# Patient Record
Sex: FEMALE | Race: WHITE
[De-identification: names, ages, dates, MRNs, and addresses within clinical notes are randomized per-mention and may not be internally consistent; named-entity substitution may affect disease eponyms.]

---

## 2018-05-15 ENCOUNTER — HOSPITAL ENCOUNTER (INPATIENT)
Dept: HOSPITAL 25 - AA | Age: 63
LOS: 44 days | Discharge: HOME HEALTH | DRG: 470 | End: 2018-06-28
Attending: ORTHOPAEDIC SURGERY | Admitting: ORTHOPAEDIC SURGERY
Payer: MEDICAID

## 2018-05-15 DIAGNOSIS — Z79.01: ICD-10-CM

## 2018-05-15 DIAGNOSIS — Z72.89: ICD-10-CM

## 2018-05-15 DIAGNOSIS — Z87.442: ICD-10-CM

## 2018-05-15 DIAGNOSIS — E11.9: ICD-10-CM

## 2018-05-15 DIAGNOSIS — D62: ICD-10-CM

## 2018-05-15 DIAGNOSIS — F41.9: ICD-10-CM

## 2018-05-15 DIAGNOSIS — M21.062: ICD-10-CM

## 2018-05-15 DIAGNOSIS — Z88.0: ICD-10-CM

## 2018-05-15 DIAGNOSIS — Z80.0: ICD-10-CM

## 2018-05-15 DIAGNOSIS — I10: ICD-10-CM

## 2018-05-15 DIAGNOSIS — Z88.6: ICD-10-CM

## 2018-05-15 DIAGNOSIS — Z90.49: ICD-10-CM

## 2018-05-15 DIAGNOSIS — K21.9: ICD-10-CM

## 2018-05-15 DIAGNOSIS — M17.12: Primary | ICD-10-CM

## 2018-05-15 DIAGNOSIS — E78.5: ICD-10-CM

## 2018-05-15 DIAGNOSIS — K58.9: ICD-10-CM

## 2018-05-15 DIAGNOSIS — Z82.49: ICD-10-CM

## 2018-05-15 DIAGNOSIS — Z87.891: ICD-10-CM

## 2018-05-15 DIAGNOSIS — Z88.5: ICD-10-CM

## 2018-05-15 DIAGNOSIS — Z96.651: ICD-10-CM

## 2018-05-15 DIAGNOSIS — M25.762: ICD-10-CM

## 2018-05-15 DIAGNOSIS — Z86.718: ICD-10-CM

## 2018-05-15 DIAGNOSIS — E66.01: ICD-10-CM

## 2018-05-15 PROCEDURE — 85610 PROTHROMBIN TIME: CPT

## 2018-05-15 PROCEDURE — 85018 HEMOGLOBIN: CPT

## 2018-05-15 PROCEDURE — C1776 JOINT DEVICE (IMPLANTABLE): HCPCS

## 2018-05-15 PROCEDURE — 36415 COLL VENOUS BLD VENIPUNCTURE: CPT

## 2018-05-15 PROCEDURE — 85049 AUTOMATED PLATELET COUNT: CPT

## 2018-05-15 PROCEDURE — 80048 BASIC METABOLIC PNL TOTAL CA: CPT

## 2018-05-15 PROCEDURE — 85014 HEMATOCRIT: CPT

## 2018-06-26 PROCEDURE — 0SRD0J9 REPLACEMENT OF LEFT KNEE JOINT WITH SYNTHETIC SUBSTITUTE, CEMENTED, OPEN APPROACH: ICD-10-PCS | Performed by: ORTHOPAEDIC SURGERY

## 2018-06-26 RX ADMIN — INSULIN LISPRO SCH UNITS: 100 INJECTION, SOLUTION INTRAVENOUS; SUBCUTANEOUS at 18:06

## 2018-06-26 RX ADMIN — PRAVASTATIN SODIUM SCH MG: 20 TABLET ORAL at 18:06

## 2018-06-26 RX ADMIN — OXYCODONE HYDROCHLORIDE PRN MG: 5 CAPSULE ORAL at 19:41

## 2018-06-26 RX ADMIN — DOCUSATE SODIUM SCH MG: 100 CAPSULE, LIQUID FILLED ORAL at 22:54

## 2018-06-26 RX ADMIN — FAMOTIDINE SCH MG: 20 TABLET, FILM COATED ORAL at 18:06

## 2018-06-26 RX ADMIN — MAGNESIUM HYDROXIDE SCH ML: 400 SUSPENSION ORAL at 22:54

## 2018-06-26 RX ADMIN — OXYCODONE HYDROCHLORIDE PRN MG: 5 CAPSULE ORAL at 13:52

## 2018-06-26 RX ADMIN — ACETAMINOPHEN PRN MG: 325 TABLET ORAL at 19:40

## 2018-06-26 RX ADMIN — CEFAZOLIN SODIUM SCH MLS/HR: 1 SOLUTION INTRAVENOUS at 15:29

## 2018-06-26 RX ADMIN — CEFAZOLIN SODIUM SCH MLS/HR: 1 SOLUTION INTRAVENOUS at 23:06

## 2018-06-26 RX ADMIN — INSULIN LISPRO SCH UNITS: 100 INJECTION, SOLUTION INTRAVENOUS; SUBCUTANEOUS at 22:56

## 2018-06-26 NOTE — CONS
CC:  Trenton James MD; Alyson Nguyen MD *

 

CONSULTATION REPORT:

 

DATE OF CONSULT:  06/26/18

 

ATTENDING HOSPITALIST:  Arturo Laguna MD

 

PRIMARY ORTHOPEDIC DOCTOR:  Trenton James MD

 

PRIMARY CARE PHYSICIAN:  Alyson Nguyen MD

 

REASON FOR CONSULT:  Medical comanagement.

 

CHIEF COMPLAINT:  Left knee pain.

 

HISTORY OF PRESENT ILLNESS:  Mrs. Du is a pleasant 63-year-old female who has 
past medical history significant for hypertension, hyperlipidemia, and type 2 
diabetes mellitus who has been evaluated by the orthopedic services of [facility] for consideration of left knee replacement.  The patient had 
her right knee replaced by Dr. James in the past; however, she continues to 
have pain on her left knee as well due to longstanding history of 
osteoarthritis.  She also suffered from morbid obesity and has been not able to 
walk or ambulate without stopping due to pain.  She had tried multiple 
conservative measures to control her chronic knee pain; however, the old failed 
and the patient was considered for left knee arthroplasty after discussion with 
Dr. James.  She was taken to the operating room earlier today and had a left 
total knee replacement by Dr. James that went essentially unremarkable.  We 
were asked to see the patient for medical management after her surgery given 
her history of hypertension, hyperlipidemia, diabetes mellitus, as well as her 
history of DVT after she had her right knee arthroplasty.

 

PAST MEDICAL HISTORY:  As mentioned above, significant for hypertension, 
diabetes mellitus, hyperlipidemia, morbid obesity, osteoarthritis, as well as 
history of DVT after right knee arthroplasty in the past, and GERD.

 

PAST SURGICAL HISTORY:  Significant for right knee arthroplasty as well as a 
cholecystectomy.

 

CURRENT MEDICATIONS:  Include:

1.  Garlic 1 tablet p.o. q.a.m.

2.  Glipizide 5 mg p.o. daily.

3.  Irbesartan 75 mg p.o. daily.

4.  Glucophage 1000 mg p.o. b.i.d.

5.  Multivitamin with iron and folic acid 1 tablet p.o. daily.

6.  Naproxen 220/25 one tablet q.p.m. p.r.n. for pain.

7.  Omega-3 fish oil 1000 mg p.o. daily.

8.  Pravastatin 10 mg p.o. daily.

9.  Ranitidine 300 mg p.o. q.p.m.

10.  Percocet 5/325 mg 1 to 2 tablets every 6 hours as needed for pain.

 

ALLERGIES:  She is allergic to MORPHINE, PENICILLINS, and ASPIRIN.

 

FAMILY HISTORY:  Significant for colorectal malignancies in an aunt and uncle 
from her paternal side.

 

SOCIAL HISTORY:  The patient is a former smoker who smoked 1 pack per day for 
10 years and quit 3 to 4 years ago.  She still works and runs a pet store for 
small animal feeds and she denies alcohol intake.

 

REVIEW OF SYSTEMS:  See HPI.  Otherwise, 14-point review of systems were 
reviewed and were otherwise negative.

 

PHYSICAL EXAM:  General:  She is a morbidly obese, upper middle aged female, 
appears comfortable and in no acute distress or discomfort at the time of 
consultation.  Vitals:  Reveal blood pressure of 123/87, pulse of 77, 
temperature of 99.0, respirations of 12, and O2 sat of 97% on room air.  HEENT:
  Head is normocephalic and atraumatic.  Sclerae anicteric.  PERRLA.  EOMs 
intact. Oropharynx is pink and moist.  Neck:  Supple.  Trachea midline.  No 
cervical adenopathy or thyromegaly.  Lungs: Clear to auscultation bilaterally.  
Heart: Regular rate and rhythm. Normal S1 and S2 without rubs, murmurs, or 
gallops. Back:  Normal curvature.  No CVA tenderness.  Breast exam deferred at 
this time. Abdomen:  Soft, nontender, and nondistended.  No hernias, masses, or 
hepatosplenomegaly.  Extremities:  Without cyanosis, clubbing, or edema.  Left 
knee is secured with Ace wrap as well as a cooling circulating device.  
Neurologic:  She is awake and alert x3 and neurologic exam is grossly intact.  
Rectal exam deferred at this time.

 

LABORATORY WORKUP:  She had laboratory workup performed on 05/07/18 with white 
count of 9000, hemoglobin 12.5, hematocrit 38, and platelets of 286,000.  
Chemical panel performed on the same day revealed sodium of 138, potassium 4.4, 
chloride 100, CO2 of 31, BUN of 13, and creatinine of 1.25.  Her glucose was 306
, hemoglobin A1c dated back in July 2017 was 6.9.

 

ACCESSORY DIAGNOSTIC DATA:  Left knee x-ray in the postoperative period 
revealed left knee replacement in satisfactory position.

 

ASSESSMENT:  A 63-year-old female with longstanding history of left knee 
osteoarthritis as well as past medical history of hypertension, hyperlipidemia, 
diabetes mellitus type 2 who is postop day #0 status post left total knee 
arthroplasty.

 

PLAN/RECOMMENDATIONS:

1.  Status post left total knee arthroplasty.  Management is by orthopedic 
team. PT and OT orders are in place.  She seems to be comfortable and use 
analgesic as needed for pain control.  She also has a bowel regimen order in 
place.  Anticipate anticoagulation given her history of deep venous thrombosis 
and she will be covered with Lovenox subcu daily as well as initiating Coumadin 
therapy for the time being. Daily checks of INR is in place as well.

2.  Hypertension.  She appears to be normotensive in the postoperative period 
and will continue her on irbesartan, her home prescription.

3.  Type 2 diabetes mellitus.  At this point, we will hold her glipizide and 
metformin given her hyperglycemic readings prior to surgery as well as elevated 
hemoglobin A1c.  We will continue blood glucose checks q.a.c. and q.h.s. and 
coverage per sliding scale given her elevated BMI.

4.  Hyperlipidemia.  We will continue her statin.

5.  Gastroesophageal reflux disease.  She will continue her H2 blocker as 
prescribed.

6.  DVT prophylaxis per orthopedic team, Lovenox transitioning to oral Coumadin.

7.  Code status, she is a full code.

 

TIME SPENT:  I spent approximately 45 minutes in this consultation with greater 
than 50% spent on face-to-face taking history and performing physical exam.  I 
have discussed the case with Dr. Laguna, my attending, who is in agreement with 
plans and we will follow her up accordingly.

 

____________________________________ FLOWER RODRIGUEZ

 

[id number]/CPS #: [id number]

DONG

## 2018-06-26 NOTE — RAD
Indication: Left knee replacement



2 views of left knee demonstrates bipolar left knee replacement in satisfactory position.

No loosening is noted.



IMPRESSION: Right pole of left knee replacement in satisfactory position.

## 2018-06-27 LAB
HCT VFR BLD AUTO: 29 % (ref 35–47)
HGB BLD-MCNC: 9.7 G/DL (ref 12–16)
INR PPP/BLD: 0.97 (ref 0.77–1.02)
PLATELET # BLD AUTO: 243 10^3/UL (ref 150–450)

## 2018-06-27 RX ADMIN — LOSARTAN POTASSIUM SCH MG: 25 TABLET, FILM COATED ORAL at 09:10

## 2018-06-27 RX ADMIN — OXYCODONE HYDROCHLORIDE PRN MG: 5 CAPSULE ORAL at 14:28

## 2018-06-27 RX ADMIN — INSULIN LISPRO SCH UNITS: 100 INJECTION, SOLUTION INTRAVENOUS; SUBCUTANEOUS at 12:06

## 2018-06-27 RX ADMIN — OXYCODONE HYDROCHLORIDE AND ACETAMINOPHEN PRN TAB: 5; 325 TABLET ORAL at 06:18

## 2018-06-27 RX ADMIN — OXYCODONE HYDROCHLORIDE AND ACETAMINOPHEN PRN TAB: 5; 325 TABLET ORAL at 11:55

## 2018-06-27 RX ADMIN — INSULIN LISPRO SCH UNITS: 100 INJECTION, SOLUTION INTRAVENOUS; SUBCUTANEOUS at 09:11

## 2018-06-27 RX ADMIN — PRAVASTATIN SODIUM SCH MG: 20 TABLET ORAL at 17:44

## 2018-06-27 RX ADMIN — MAGNESIUM HYDROXIDE SCH ML: 400 SUSPENSION ORAL at 09:10

## 2018-06-27 RX ADMIN — RIVAROXABAN SCH MG: 10 TABLET, FILM COATED ORAL at 11:56

## 2018-06-27 RX ADMIN — FAMOTIDINE SCH MG: 20 TABLET, FILM COATED ORAL at 17:44

## 2018-06-27 RX ADMIN — INSULIN LISPRO SCH UNITS: 100 INJECTION, SOLUTION INTRAVENOUS; SUBCUTANEOUS at 17:44

## 2018-06-27 RX ADMIN — MAGNESIUM HYDROXIDE SCH ML: 400 SUSPENSION ORAL at 20:06

## 2018-06-27 RX ADMIN — INSULIN LISPRO SCH UNITS: 100 INJECTION, SOLUTION INTRAVENOUS; SUBCUTANEOUS at 20:06

## 2018-06-27 RX ADMIN — OXYCODONE HYDROCHLORIDE PRN MG: 5 CAPSULE ORAL at 20:16

## 2018-06-27 RX ADMIN — DOCUSATE SODIUM SCH MG: 100 CAPSULE, LIQUID FILLED ORAL at 09:10

## 2018-06-27 RX ADMIN — CEFAZOLIN SODIUM SCH MLS/HR: 1 SOLUTION INTRAVENOUS at 07:30

## 2018-06-27 RX ADMIN — DOCUSATE SODIUM SCH MG: 100 CAPSULE, LIQUID FILLED ORAL at 20:06

## 2018-06-27 RX ADMIN — OXYCODONE HYDROCHLORIDE AND ACETAMINOPHEN PRN TAB: 5; 325 TABLET ORAL at 18:46

## 2018-06-27 RX ADMIN — OXYCODONE HYDROCHLORIDE PRN MG: 5 CAPSULE ORAL at 09:10

## 2018-06-27 NOTE — PN
Progress Note





- Progress Note


Date of Service: 06/27/18


Note: 





VSStable.  Awake, alert, cooperative, breathing easily.  I and O are 

satisfactory.





2 drains removed left knee. Left PT pulse is 2 plus.  Left foot sensory and 

movements all intact. Can do a leg lift, barely.





Labs Pending





Imp: Stable.





Plans:  TKR protocol

## 2018-06-27 NOTE — OP
CC:  Dr. Nguyen *

 

DATE OF OPERATION:  06/26/18 - ROOM #[id number]

 

DATE OF BIRTH:  [birth year]

 

SURGICAL CARE:  Left knee.

 

SURGEON:  Trenton James MD

 

ASSISTANTS:

1.  FLOWER Gutierrez first assist.

2.  Johanna Carmichael, surgical tech.

 

ANESTHESIOLOGIST:  Dr. Michael Bradley.

 

ANESTHESIA:  Left thigh adductor canal block and spinal with IV sedation.

 

PRE-OP DIAGNOSIS:  Severe arthritis of the left knee in the lateral compartment.

 

POST-OP DIAGNOSIS:  Severe arthritis of the left knee in the lateral 
compartment.

 

OPERATIVE PROCEDURE:  Left total knee replacement.

 

COMPONENTS UTILIZED:  Lorna Persona knee.  All components were cemented, a 
size 32 patella, a size 6 left femur, a size E left tibia, and a 10 articular 
surface.

 

COMPLICATIONS:  There were no complications.

 

DRAINS:  Two drains, left knee for blood collection at the end of the case.

 

BLOOD LOSS:  200 mL.

 

REPLACEMENT:  Crystalloid fluids.

 

Tourniquet control was utilized just in the cleanup and cementing phase of this 
case.

 

TOURNIQUET:  300.

 

INDICATIONS:  Severe arthritis of the left knee, it has been no longer 
responsive to nonoperative care and the left total knee was recommended.

 

DESCRIPTION OF PROCEDURE:  The patient was brought to the operating room and 
placed on the operating table in a supine position.  After the canal block had 
been done in the holding area by Dr. Bradley in the operating room in the 
seated position, the spinal anesthetic was administered.  The patient was 
returned to the supine position.  A Wilks catheter was inserted.  The left 
proximal thigh was wrapped with a tourniquet.  The posterior tibial pulse was 
noted to be 2+ and the left lower extremity was given a preliminary 
chlorhexidine prep and then a formal ChloraPrep from the tourniquet to the tips 
of the toes.  After prepping, draping and sealing off, we did our universal 
protocol time-out confirming Johanna Du and a plan for left total knee 
replacement, we all agreed and we proceeded.  The surgical care was done with 
the knee on a padded foot piece and the hip and knee acutely flexed.  The skin 
incision went from two finger-breadths proximal to the superior pole of the 
patella to the medial aspect of the tibial tubercle.  Careful hemostasis was 
checked and achieved throughout the case utilizing electrocautery.  The knee 
was entered medial parapatellar, the quad tendon divided at the junction at the 
rectus femoris and vastus medialis staying as close to the vastus medialis 
muscle in the tendon as possible.  The knee was completely eburnated and the 
lateral compartment was scoping out of the lateral tibial plateau.  Osteophytes 
in the intercondylar notch, osteophytes medially and laterally.  The patella 
was made so that it could be everted.  The anteromedial soft tissues on the 
tibia were elevated subperiosteally going around to the deep MCL and then to 
the posteromedial corner of the knee.  The remains of the anterior horn and 
medial meniscus were carefully excised.  The osteophytes were removed.  The ACL 
and PCL were uplifted from their femoral origins and the tibia was made so that 
it could be subluxated forward from under the femur.  The distal anterior femur 
was exposed subperiosteally for referencing and measuring.  At this point, we 
made our proximal tibial cut and our goal of this cut was to have a tibial 
surface that would be perpendicular to the long axis of the tibia and have a 
slight posterior slope removing just 0 to 2 mm of bone on the low side of the 
lateral tibial plateau.  The femoral intramedullary drill was utilized in the 
intramedullary canal, the femur was suctioned to discourage embolization.  The 
distal femoral cutting guide was applied with 6 degrees of valgus and this cut 
was completed.  The extension gap was satisfactory for a 10, and the femur was 
then measured for a size 6.  The anterior, posterior and chamfering cuts were 
completed.  We then finished removal of the posterior horn medial meniscus 
carefully preserving the MCL, posterior horn lateral meniscus, and the PCL.  
Osteophyte was removed from the posterior medial femoral condyle.  At this stage
, we had nice ligamentous balance and 90 degrees of flexion with a 10.

 

The femur was completed with the intercondylar cutout.  The femur was then 
irrigated and suctioned x6 and emptied and bone plug inserted.  The tibia was 
then completed for a size E and the knee was articulated and extended with a E 
tibia, 10 articular surface, and a 6 femur with full knee extension, stable 
ligaments in extension and stable ligaments in 90 degrees of flexion.

 

The patella was cut flat, a 32 was chosen, 3 drill holes were made.  These were 
undercut for optimal cement interdigitation.  A lateral release was not 
necessary.

 

The leg was then exsanguinated.  The tourniquet elevated to 300.  The knee was 
cleaned in extension with pulsed saline 2.5 L.  The knee was then cleaned in 
flexion with retractors in place and all bony surfaces were cleaned with the 
pulsed saline and then dried carefully.  The cement was mixed and the 
components were cemented into position.  The patella followed by the tibia, 
followed by the femur, each was impacted.  Excess cement was removed and the 
knee was articulated and extended during the final hardening.

 

The posteromedial and medial soft tissues, pericapsular tissues were 
infiltrated with Marcaine 0.5% without epinephrine mixed with 1% Xylocaine with 
epinephrine, approximately 30 mL were utilized.  During closure, we checked and 
achieved hemostasis.  We irrigated several times with saline.  The quad 
mechanism closed with interrupted #1 Vicryl in a figure-of-eight fashion.  The 
drains were brought out to superolateral suprapatellar pouch.  The medial 
retinaculum closed with #1 Vicryl and then distally we used 0 Vicryl.  Deep 
fashion bursa closed with interrupted 0 Vicryl and the superficial subcu closed 
with 3-0 Vicryl and the skin then closed with staples.  The knee was extended 
completely and flexed completely past 130 degrees several times during the 
closure.

 

Dressing was done after washing and drying with Betadine soaked release sterile 
gauze, sterile Webril and then cryotherapy cuff, ABD pads and a 6-inch Ace 
bandage loosely applied.  The patient was returned to the recovery room in 
stable and satisfactory condition, having tolerated the procedure very well.

 

 [id number]/CPS #: [id number]

DONG

## 2018-06-27 NOTE — PN
Subjective


Date of Service: 06/27/18


Interval History: 





Patient in significant pain with activity but none at rest. Patient has had 

feliciano out and is urinating well. Patient has been passing gas and denies CP, SOB

, Dizziness, F/C, N/V, abdominal pain, dysuria, palpitations, numbness or 

tingling, or other pain. Patient states that she has never had a non-provoked 

blood clot and is not willing to take warfarin and as such would like Xarelto.


Family History: Unchanged from Admission


Social History: Unchanged from Admission


Past Medical History: Unchanged from Admission





Objective


Active Medications: 








Acetaminophen (Tylenol Tab*)  650 mg PO Q4H PRN


   PRN Reason: PAIN


   Last Admin: 06/26/18 19:40 Dose:  650 mg


Cyclobenzaprine HCl (Flexeril Tab*)  10 mg PO TID PRN


   PRN Reason: SPASMS


Dextrose (D50w Syringe 50 Ml*)  12.5 gm IV PUSH .FOR FS < 60 - SS PRN


   PRN Reason: FS < 60


Diphenhydramine HCl (Benadryl Iv*)  25 mg IV Q6H PRN


   PRN Reason: itching


Diphenhydramine HCl (Benadryl Po*)  25 mg PO Q6H PRN


   PRN Reason: itching


Docusate Sodium (Colace Cap*)  100 mg PO BID [facility]


   Last Admin: 06/27/18 09:10 Dose:  100 mg


Famotidine (Pepcid Tab*)  40 mg PO QPM [facility]


   Last Admin: 06/26/18 18:06 Dose:  40 mg


Hydromorphone HCl (Dilaudid Inj*)  0.5 mg IV SLOW PU Q4H PRN


   PRN Reason: PAIN - UNRELIEVED


Hydromorphone HCl (Dilaudid Inj*)  1 mg IV SLOW PU Q4H PRN


   PRN Reason: PAIN - UNCONTROLLED


Lactated Ringer's (Lactated Ringers 1000 Ml Bag*)  1,000 mls @ 100 mls/hr IV 

PER RATE [facility]


   Last Admin: 06/26/18 22:41 Dose:  100 mls/hr


Insulin Human Lispro (Humalog*)  0 units SUBCUT ACHS [facility]; Protocol


   Last Admin: 06/27/18 12:06 Dose:  6 units


Losartan Potassium (Cozaar Tab*)  25 mg PO QAM [facility]


   Last Admin: 06/27/18 09:10 Dose:  25 mg


Magnesium Hydroxide (Milk Of Magnesia Liq*)  30 ml PO BID [facility]


   Last Admin: 06/27/18 09:10 Dose:  30 ml


Magnesium Hydroxide (Milk Of Magnesia Liq*)  30 ml PO Q6H PRN


   PRN Reason: constipation


Ondansetron HCl (Zofran 40 Mg Vial*)  4 mg IV Q6H PRN


   PRN Reason: nausea


Ondansetron HCl (Zofran Tab*)  4 mg PO Q6H PRN


   PRN Reason: NAUSEA


Oxycodone HCl (Roxycodone Tab*)  10 mg PO Q4H PRN


   PRN Reason: PAIN - SEVERE


   Last Admin: 06/27/18 09:10 Dose:  5 mg


Oxycodone/Acetaminophen (Percocet 5/325 Tab*)  2 tab PO Q4H PRN


   PRN Reason: PAIN - MODERATE


   Last Admin: 06/27/18 11:55 Dose:  2 tab


Oxycodone/Acetaminophen (Percocet 5/325 Tab*)  1 tab PO Q4H PRN


   PRN Reason: PAIN - MILD


Pravastatin Sodium (Pravachol (Nf))  10 mg PO QPM [facility]


   Last Admin: 06/26/18 18:06 Dose:  10 mg


Rivaroxaban (Xarelto(*))  10 mg PO DAILY [facility]


   Last Admin: 06/27/18 11:56 Dose:  10 mg








 Vital Signs - 8 hr











  06/27/18 06/27/18 06/27/18





  06:18 07:36 09:10


 


Temperature  98.2 F 


 


Pulse Rate  79 


 


Respiratory 16 16 18





Rate   


 


Blood Pressure  137/49 





(mmHg)   


 


O2 Sat by Pulse  96 





Oximetry   














  06/27/18 06/27/18 06/27/18





  11:50 11:55 12:07


 


Temperature 97.5 F  


 


Pulse Rate 78  


 


Respiratory 16 18 18





Rate   


 


Blood Pressure 119/53  





(mmHg)   


 


O2 Sat by Pulse 99  





Oximetry   














  06/27/18





  12:08


 


Temperature 


 


Pulse Rate 


 


Respiratory 18





Rate 


 


Blood Pressure 





(mmHg) 


 


O2 Sat by Pulse 





Oximetry 











Oxygen Devices in Use Now: None


Appearance: Patient is a 64yo female who appears stated age and is sitting in 

the bed in NAD.


Eyes: No Scleral Icterus, PERRLA


Ears/Nose/Mouth/Throat: NL Teeth, Lips, Gums, Clear Oropharnyx, Mucous 

Membranes Moist


Neck: NL Appearance and Movements; NL JVP, Trachea Midline


Respiratory: Symmetrical Chest Expansion and Respiratory Effort, Clear to 

Auscultation


Cardiovascular: NL Sounds; No Murmurs; No JVD, RRR, - - 1+ edema in LLE


Abdominal: NL Sounds; No Tenderness; No Distention, No Hepatosplenomegaly


Lymphatic: No Cervical Adenopathy


Extremities: No Clubbing, Cyanosis


Skin: No Rash or Ulcers, No Nodules or Sclerosis


Neurological: Alert and Oriented x 3, NL Sensation, NL Muscle Strength and Tone

, - - CN II-XII intact


Result Diagrams: 


 06/27/18 07:42





 06/27/18 07:42





Assess/Plan/Problems-Billing


Assessment: 





Patient is a 64yo female with a PMH for HTN, DM II, and provoked DVT who is S/P 

LTKA and is doing well. 





- Patient Problems


(1) Post-operative state


Current Visit: Yes   Status: Acute   Code(s): Z98.890 - OTHER SPECIFIED 

POSTPROCEDURAL STATES   SNOMED Code(s): 96826379


   Comment: 


Management per primary team. Pain well controlled. H/H decreased expected 

amount. Urinating with feliciano removed. PT/OT. No BM.    





(2) HTN (hypertension)


Current Visit: Yes   Status: Acute   Code(s): I10 - ESSENTIAL (PRIMARY) 

HYPERTENSION   SNOMED Code(s): 77416051


   Comment: 


Normotensive, continue Losartan.   





(3) DM II (diabetes mellitus, type II), controlled


Current Visit: Yes   Status: Acute   Code(s): E11.9 - TYPE 2 DIABETES MELLITUS 

WITHOUT COMPLICATIONS   SNOMED Code(s): 24341321


   Comment: 


Poorly controlled. Will increase SSI. Resume oral medications at home.    





(4) HLD (hyperlipidemia)


Current Visit: Yes   Status: Acute   Code(s): E78.5 - HYPERLIPIDEMIA, 

UNSPECIFIED   SNOMED Code(s): 91815308


   Comment: 


Continue Pravastatin   





(5) GERD (gastroesophageal reflux disease)


Current Visit: Yes   Status: Acute   Code(s): K21.9 - GASTRO-ESOPHAGEAL REFLUX 

DISEASE WITHOUT ESOPHAGITIS   SNOMED Code(s): 917222199


   Comment: 


Continue Famotidine.   





(6) DVT prophylaxis


Current Visit: Yes   Status: Acute   Code(s): HZN7206 -    SNOMED Code(s): 

734000525


   Comment: 


Xarelto   





(7) Full code status


Current Visit: Yes   Status: Acute   Code(s): Z78.9 - OTHER SPECIFIED HEALTH 

STATUS   SNOMED Code(s): 524327935


   


Status and Disposition: 





Inpatient. Management per primary team.

## 2018-06-28 VITALS — DIASTOLIC BLOOD PRESSURE: 57 MMHG | SYSTOLIC BLOOD PRESSURE: 125 MMHG

## 2018-06-28 LAB
HCT VFR BLD AUTO: 28 % (ref 35–47)
HGB BLD-MCNC: 9.8 G/DL (ref 12–16)
INR PPP/BLD: 0.94 (ref 0.77–1.02)
PLATELET # BLD AUTO: 220 10^3/UL (ref 150–450)

## 2018-06-28 RX ADMIN — MAGNESIUM HYDROXIDE SCH ML: 400 SUSPENSION ORAL at 08:21

## 2018-06-28 RX ADMIN — OXYCODONE HYDROCHLORIDE AND ACETAMINOPHEN PRN TAB: 5; 325 TABLET ORAL at 08:21

## 2018-06-28 RX ADMIN — OXYCODONE HYDROCHLORIDE PRN MG: 5 CAPSULE ORAL at 04:00

## 2018-06-28 RX ADMIN — ACETAMINOPHEN PRN MG: 325 TABLET ORAL at 11:56

## 2018-06-28 RX ADMIN — OXYCODONE HYDROCHLORIDE PRN MG: 5 CAPSULE ORAL at 11:56

## 2018-06-28 RX ADMIN — DOCUSATE SODIUM SCH MG: 100 CAPSULE, LIQUID FILLED ORAL at 08:21

## 2018-06-28 RX ADMIN — LOSARTAN POTASSIUM SCH MG: 25 TABLET, FILM COATED ORAL at 08:21

## 2018-06-28 RX ADMIN — RIVAROXABAN SCH MG: 10 TABLET, FILM COATED ORAL at 08:21

## 2018-06-28 RX ADMIN — INSULIN LISPRO SCH UNITS: 100 INJECTION, SOLUTION INTRAVENOUS; SUBCUTANEOUS at 08:22

## 2018-06-28 RX ADMIN — INSULIN LISPRO SCH UNITS: 100 INJECTION, SOLUTION INTRAVENOUS; SUBCUTANEOUS at 11:56

## 2018-06-28 NOTE — PN
Progress Note





- Progress Note


Date of Service: 06/28/18


Note: 





VSStable Temp 98.9    Intake 2550, Output  2400





Awake, alert, cooperative and braeathing easily.  Left knee surgery is swollen, 

spots of sanguinous drainage, redressed with betadine telfa.





N/V left foot intact


On Xarelto





Imp:  Acute blood loss anemia.


           Stable after TKR





Plans:  Up with walker

## 2018-06-29 NOTE — DS
DISCHARGE SUMMARY:

 

DATE OF ADMISSION:  06/26/18.

 

DATE OF DISCHARGE:  06/28/18

 

ATTENDING SURGEON:  Trenton James MD * (DICTATED BY FLOWER VARGAS)

 

PRINCIPAL DIAGNOSIS:  Severe arthritis of the left knee.

 

DISCHARGE DIAGNOSIS:  Severe arthritis of the left knee.

 

HISTORY OF PRESENT ILLNESS:  Ms. Du is a 63-year-old female with continued 
complaints of left knee pain.  She had failed conservative treatment and 
elected to proceed with a left total knee arthroplasty.

 

HOSPITAL COURSE:  Ms. Du was admitted electively to the hospital on 06/26/18 
and underwent a left total knee arthroplasty.  She tolerated the procedure well 
without complications.  Postoperatively, she was placed on Xarelto for DVT 
prophylaxis.  On postoperative day 1, her H and H was 9.7 and 29; on 
postoperative day 2, 9.8 and

28.  At the time of discharge, on 06/28/18, she was afebrile and her vital 
signs were stable.  She was discharged home in stable condition.

 

DISCHARGE MEDICATIONS:

1.  She was given Percocet 5/325, 1 to 2 tabs every 4 to 6 hours as needed for 
pain.

2.  She was given Flexeril 10 mg tabs to take one, 2 to 3 times daily for 
muscle spasms.

3.  Colace 100 mg 1 tab 2 to 3 times daily as needed for constipation.

4.  She was given Zofran 4 mg tabs every 6 hours as needed for nausea.

5.  Xarelto 10 mg daily.

6.  Cozaar 25 mg daily.

7.  Pravachol 10 mg daily.

 

PHYSICAL EXAMINATION:  Upon discharge, she was afebrile.  Her vital signs were 
stable.  The wound was clean and dry.  She was ambulating well with the aid of 
a walker.  She was distally neurovascularly intact.

 

DISCHARGE INSTRUCTIONS:  She was discharged home in stable condition.  She is 
on Xarelto 10 mg daily for DVT prophylaxis.  She was given Percocet and 
Flexeril for pain, Colace for constipation, and Zofran for nausea.  She can 
start showering tomorrow.  She is weightbearing as tolerated.  She will follow 
up with Dr. James in 2 weeks in clinic.

 

____________________________________ FLOWER VARGAS

 

[id number]/[facility] #: [id number]

MTDD

## 2018-08-02 ENCOUNTER — HOSPITAL ENCOUNTER (OUTPATIENT)
Dept: HOSPITAL 25 - ED | Age: 63
Setting detail: OBSERVATION
LOS: 1 days | Discharge: HOME | End: 2018-08-03
Attending: HOSPITALIST | Admitting: HOSPITALIST
Payer: MEDICARE

## 2018-08-02 DIAGNOSIS — N30.90: ICD-10-CM

## 2018-08-02 DIAGNOSIS — K21.9: ICD-10-CM

## 2018-08-02 DIAGNOSIS — M17.12: ICD-10-CM

## 2018-08-02 DIAGNOSIS — E87.2: ICD-10-CM

## 2018-08-02 DIAGNOSIS — Z87.442: ICD-10-CM

## 2018-08-02 DIAGNOSIS — F41.0: Primary | ICD-10-CM

## 2018-08-02 DIAGNOSIS — Z87.891: ICD-10-CM

## 2018-08-02 DIAGNOSIS — E11.9: ICD-10-CM

## 2018-08-02 DIAGNOSIS — Z88.0: ICD-10-CM

## 2018-08-02 DIAGNOSIS — E78.5: ICD-10-CM

## 2018-08-02 DIAGNOSIS — R07.9: ICD-10-CM

## 2018-08-02 DIAGNOSIS — I10: ICD-10-CM

## 2018-08-02 DIAGNOSIS — E66.9: ICD-10-CM

## 2018-08-02 DIAGNOSIS — R00.2: ICD-10-CM

## 2018-08-02 DIAGNOSIS — R06.02: ICD-10-CM

## 2018-08-02 LAB
BASOPHILS # BLD AUTO: 0.1 10^3/UL (ref 0–0.2)
EOSINOPHIL # BLD AUTO: 0.3 10^3/UL (ref 0–0.6)
HCT VFR BLD AUTO: 35 % (ref 35–47)
HGB BLD-MCNC: 11.7 G/DL (ref 12–16)
LYMPHOCYTES # BLD AUTO: 2.4 10^3/UL (ref 1–4.8)
MCH RBC QN AUTO: 28 PG (ref 27–31)
MCHC RBC AUTO-ENTMCNC: 33 G/DL (ref 31–36)
MCV RBC AUTO: 84 FL (ref 80–97)
MONOCYTES # BLD AUTO: 0.7 10^3/UL (ref 0–0.8)
NEUTROPHILS # BLD AUTO: 6.7 10^3/UL (ref 1.5–7.7)
NRBC # BLD AUTO: 0 10^3/UL
NRBC BLD QL AUTO: 0
PLATELET # BLD AUTO: 302 10^3/UL (ref 150–450)
RBC # BLD AUTO: 4.17 10^6/UL (ref 4–5.4)
WBC # BLD AUTO: 10.2 10^3/UL (ref 3.5–10.8)

## 2018-08-02 PROCEDURE — 70450 CT HEAD/BRAIN W/O DYE: CPT

## 2018-08-02 PROCEDURE — 71275 CT ANGIOGRAPHY CHEST: CPT

## 2018-08-02 PROCEDURE — 93017 CV STRESS TEST TRACING ONLY: CPT

## 2018-08-02 PROCEDURE — A9502 TC99M TETROFOSMIN: HCPCS

## 2018-08-02 PROCEDURE — 81015 MICROSCOPIC EXAM OF URINE: CPT

## 2018-08-02 PROCEDURE — 83605 ASSAY OF LACTIC ACID: CPT

## 2018-08-02 PROCEDURE — 83036 HEMOGLOBIN GLYCOSYLATED A1C: CPT

## 2018-08-02 PROCEDURE — 78452 HT MUSCLE IMAGE SPECT MULT: CPT

## 2018-08-02 PROCEDURE — 87086 URINE CULTURE/COLONY COUNT: CPT

## 2018-08-02 PROCEDURE — 93880 EXTRACRANIAL BILAT STUDY: CPT

## 2018-08-02 PROCEDURE — 86140 C-REACTIVE PROTEIN: CPT

## 2018-08-02 PROCEDURE — 85652 RBC SED RATE AUTOMATED: CPT

## 2018-08-02 PROCEDURE — 84484 ASSAY OF TROPONIN QUANT: CPT

## 2018-08-02 PROCEDURE — G0378 HOSPITAL OBSERVATION PER HR: HCPCS

## 2018-08-02 PROCEDURE — 85025 COMPLETE CBC W/AUTO DIFF WBC: CPT

## 2018-08-02 PROCEDURE — 87186 SC STD MICRODIL/AGAR DIL: CPT

## 2018-08-02 PROCEDURE — 80053 COMPREHEN METABOLIC PANEL: CPT

## 2018-08-02 PROCEDURE — 99283 EMERGENCY DEPT VISIT LOW MDM: CPT

## 2018-08-02 PROCEDURE — 87077 CULTURE AEROBIC IDENTIFY: CPT

## 2018-08-02 PROCEDURE — 71045 X-RAY EXAM CHEST 1 VIEW: CPT

## 2018-08-02 PROCEDURE — 93005 ELECTROCARDIOGRAM TRACING: CPT

## 2018-08-02 PROCEDURE — 36415 COLL VENOUS BLD VENIPUNCTURE: CPT

## 2018-08-02 PROCEDURE — 87040 BLOOD CULTURE FOR BACTERIA: CPT

## 2018-08-02 PROCEDURE — 81003 URINALYSIS AUTO W/O SCOPE: CPT

## 2018-08-02 PROCEDURE — 83880 ASSAY OF NATRIURETIC PEPTIDE: CPT

## 2018-08-02 PROCEDURE — 84443 ASSAY THYROID STIM HORMONE: CPT

## 2018-08-02 RX ADMIN — INSULIN LISPRO SCH UNITS: 100 INJECTION, SOLUTION INTRAVENOUS; SUBCUTANEOUS at 22:08

## 2018-08-02 NOTE — RAD
Indication: Left knee prosthesis, infection.



2 views of left knee demonstrates bipolar left knee replacement. No loosening is noted. No

evidence of periprosthetic lucency is noted. When compared to previous exam of June 26, 2018 no significant change is noted in alignment of bone density.



IMPRESSION: No definite evidence of periprosthetic lucency is noted. Knee replacement in

satisfactory position.

## 2018-08-02 NOTE — ED
Palpitations / Dysrhythmia





- HPI Summary


HPI Summary: 


This is wilfred Olivera documenting for attending Blayne Huynh MD. 


   


This patient is a 63 year old F BIBA to [facility] with a chief complaint of SOB and 

palpations since noon. The patient rates the pain 6/10 in severity. Patient 

reports sweats, shaking, high BP, CP (when breathing heavily), abdominal pain (

cramps in her upper abd area), and nausea. Patient denies vomiting, and 

diarrhea. She has had episodes similar to this since her knee surgery on 06/26/ 2018, but they usually resolved spontaneously. This episode did not.





- History of Current Complaint


Chief Complaint: EDDysrhythmPalp


Time Seen by Provider: 08/02/18 14:43


Hx Obtained From: Patient


Onset/Duration: Sudden Onset, Lasting Hours - since noon


Severity Initially: Moderate


Severity Currently: Moderate


Associated Signs & Symptoms: Chest Pain - When breathing heavily, Shortness of 

Breath, Diaphoresis, Nausea





- Allergy/Home Medications


Allergies/Adverse Reactions: 


 Allergies











Allergy/AdvReac Type Severity Reaction Status Date / Time


 


morphine Allergy  Nausea And Verified 06/26/18 06:29





   Vomiting  


 


Penicillins Allergy  See Comment Verified 06/26/18 06:29


 


aspirin AdvReac  GI BLEED Verified 06/26/18 06:29











Home Medications: 


 Home Medications





Garlic [Garlic Oil] 1,000 mg PO DAILY 08/02/18 [History Confirmed 08/02/18]


Irbesartan (NF) [Avapro (NF)] 150 mg PO DAILY 08/02/18 [History Confirmed 08/02/ 18]


LORazepam TAB(*) [Ativan 1 MG TAB (*)] 0.5 - 1 mg PO BEDTIME PRN 08/02/18 [

History Confirmed 08/02/18]


Multivit-Min/FA/Lycopen/Lutein [Centravites 50 Plus Tablet] 1 tab PO DAILY 08/02 /18 [History Confirmed 08/02/18]


Omega-3 Fatty Acids (Nf) [Fish Oil (NF)] 1,000 mg PO DAILY 08/02/18 [History 

Confirmed 08/02/18]


Omeprazole CAP* [Prilosec CAP* 20 MG] 20 mg PO DAILY 08/02/18 [History 

Confirmed 08/02/18]


Pravastatin (NF) [Pravachol (NF)] 10 mg PO QPM 08/02/18 [History Confirmed 08/02 /18]


Ranitidine TAB (NF) [Zantac TAB (NF)] 300 mg PO BEDTIME 08/02/18 [History 

Confirmed 08/02/18]


oxyCODONE/Acetamin 5/325 MG* [Percocet 5/325 TAB*] 1 tab PO Q4H PRN MDD 10 08/02 /18 [History Confirmed 08/02/18]











PMH/Surg Hx/FS Hx/Imm Hx


Endocrine/Hematology History: Reports: Hx Diabetes - DM II


   Denies: Hx Thyroid Disease


Cardiovascular History: Reports: Hx Hypercholesterolemia, Hx Hypertension - 

CONTROLLED


   Denies: Hx Congestive Heart Failure


Respiratory History: 


   Denies: Hx Asthma, Hx Chronic Obstructive Pulmonary Disease (COPD)


GI History: Reports: Hx Gastroesophageal Reflux Disease - ON MEDICATION FOR, Hx 

Irritable Bowel


   Denies: Hx Ulcer


 History: Reports: Hx Kidney Infection - HX OF, Hx Kidney Stones - HX OF IN 

THE PAST


Musculoskeletal History: Reports: Hx Arthritis - KNEES, HANDS


Sensory History: Reports: Hx Contacts or Glasses - for reading


   Denies: Hx Hearing Aid, Other Sensory Impairments


Opthamlomology History: Reports: Hx Contacts or Glasses - for reading


   Denies: Other Sensory Impairments





- Surgical History


Surgery Procedure, Year, and Place: choley, knee


Hx Anesthesia Reactions: Yes - A LITTLE SLOW TO WAKE UP


Infectious Disease History: No


Infectious Disease History: 


   Denies: Hx Clostridium Difficile, Hx Hepatitis, Hx Human Immunodeficiency 

Virus (HIV), Traveled Outside the  in Last 30 Days





- Family History


Known Family History: Positive: Hypertension, Diabetes





- Social History


Occupation: Employed Full-time - Self-employed


Lives: With Family


Alcohol Use: Occasionally


Alcohol Amount: 2 GLASSES OF WINE ON SUNDAYS


Substance Use Type: Reports: None


Substance Use Comment - Amount & Last Used: 3 CUPS OF COFFEE DAILY


Hx Tobacco Use: Yes


Smoking Status (MU): Former Smoker


Amount Used/How Often: 3-4 CIGARETTES PER DAY X 10 YEARS


Have You Smoked in the Last Year: No





Review of Systems


Positive: Skin Diaphoresis, Other - Shaking.  Negative: Fever, Chills


Negative: Erythema


Negative: Sore Throat


Positive: Palpitations, Chest Pain - when breathing heavily, Other - HTN


Positive: Shortness Of Breath.  Negative: Cough


Positive: Abdominal Pain - cramps in her upper abd area, Nausea.  Negative: 

Vomiting, Diarrhea


Negative: dysuria, hematuria


Negative: Myalgia, Edema


Negative: Rash


Neurological: Other - Denies dizziness


Psychological: Other


All Other Systems Reviewed And Are Negative: Yes





Physical Exam





- Summary


Physical Exam Summary: 


Constitutional: Well-developed, Well-nourished, Alert. (-) Distressed 


Skin: Warm, Dry


HENT: Normocephalic; Atraumatic


Eyes: Conjunctiva normal


Neck: Musculoskeletal ROM normal neck. (-) JVD, (-) Stridor, (-) Tracheal 

deviation


Cardio: Rhythm regular, rate normal, Heart sounds normal; Intact distal pulses; 

The pedal pulses are 2+ and symmetric. Radial pulses are 2+ and symmetric. (-) 

Murmur


Pulmonary/Chest wall: Effort normal. (-) Respiratory distress, (-) Wheezes, (-) 

Rales


Abd: Soft, (-), epigastric tenderness, (-) Distension, (-) Guarding, (-) Rebound


Musculoskeletal: (-) Edema, (+) surgical incision well healing over L knee. 


Lymph: (-) Cervical adenopathy


Neuro: Alert, Oriented x3. Patient became dizzy when she sat up. She had to sit 

back down during the exam.


Psych: Mood and affect Normal


Triage Information Reviewed: Yes


Vital Signs On Initial Exam: 


 Initial Vitals











Temp Pulse Resp BP Pulse Ox


 


 99.3 F   103   24   154/84   98 


 


 08/02/18 14:11  08/02/18 14:11  08/02/18 14:11  08/02/18 14:11  08/02/18 14:11











Vital Signs Reviewed: Yes





Diagnostics





- Vital Signs


 Vital Signs











  Temp Pulse Resp BP Pulse Ox


 


 08/02/18 14:11  99.3 F  103  24  154/84  98














- Laboratory


Lab Results: 


 Lab Results











  08/02/18 Range/Units





  15:05 


 


WBC  10.2  (3.5-10.8)  10^3/ul


 


RBC  4.17  (4.00-5.40)  10^6/ul


 


Hgb  11.7 L  (12.0-16.0)  g/dl


 


Hct  35  (35-47)  %


 


MCV  84  (80-97)  fL


 


MCH  28  (27-31)  pg


 


MCHC  33  (31-36)  g/dl


 


RDW  13  (10.5-15)  %


 


Plt Count  302  (150-450)  10^3/ul


 


MPV  7.8  (7.4-10.4)  um3


 


Neut % (Auto)  66.2  (38-83)  %


 


Lymph % (Auto)  24.0 L  (25-47)  %


 


Mono % (Auto)  6.7  (0-7)  %


 


Eos % (Auto)  2.5  (0-6)  %


 


Baso % (Auto)  0.6  (0-2)  %


 


Absolute Neuts (auto)  6.7  (1.5-7.7)  10^3/ul


 


Absolute Lymphs (auto)  2.4  (1.0-4.8)  10^3/ul


 


Absolute Monos (auto)  0.7  (0-0.8)  10^3/ul


 


Absolute Eos (auto)  0.3  (0-0.6)  10^3/ul


 


Absolute Basos (auto)  0.1  (0-0.2)  10^3/ul


 


Absolute Nucleated RBC  0  10^3/ul


 


Nucleated RBC %  0  











Result Diagrams: 


 08/02/18 15:05





 08/02/18 15:05


Lab Statement: Any lab studies that have been ordered have been reviewed, and 

results considered in the medical decision making process.





- Radiology


  ** Chest X-Ray


Radiology Interpretation Completed By: Radiologist - NO EVIDENCE FOR ACUTE 

DISEASE. Physician has reviewed this report.





- CT


  ** Chest CTA


CT Interpretation Completed By: Radiologist - No evidence of pulmonary embolus 

is noted. Physician has reviewed this report.





- EKG


  ** No standard instances


Cardiac Rate: NL - 96 BPM


EKG Rhythm: Sinus Rhythm


EKG Interpretation: no STEMI





Re-Evaluation





- Re-Evaluation


  ** 1


Re-Evaluation Time: 16:50


Change: Unchanged


Comment: Patient is still dizzy





Course/Dx





- Diagnoses


Provider Diagnoses: 


 Dyspnea








- Physician Notifications


Discussed Care Of Patient With: Tete Calabrese - Hospitalist


Time Discussed With Above Provider: 16:56


Instructed by Provider To: Admit As Inpatient





Discharge





- Sign-Out/Discharge


Documenting (check all that apply): Patient Departure - Admit





- Discharge Plan


Condition: Stable


Disposition: ADMITTED TO [facility]





- Billing Disposition and Condition


Condition: STABLE


Disposition: Admitted to [facility]

## 2018-08-02 NOTE — XMS REPORT
Hue France

 Created on:2018



Patient:Hue France

Sex:Female

:1955

External Reference #:[id number]





Demographics







 Address  [address] [place] [address]

 

 Home Phone  [phone]

 

 Mobile Phone  [phone]

 

 Email Address  [email or web address]

 

 Preferred Language  English

 

 Marital Status  Not  Or 

 

 Jehovah's witness Affiliation  Unknown

 

 Race  White

 

 Ethnic Group  Not  Or 









Author







 Organization  [facility]

 

 Address  [address]



   [place] [address]

 

 Phone  [phone]









Support







 Name  Relationship  Address  Phone

 

 Steven Fofana  Unavailable  Unavailable  [phone]









Care Team Providers







 Name  Role  Phone

 

 Alyson Nguyen MD  Primary Care Physician  Unavailable









Payers







 Type  Date  Identification Numbers  Payment Provider  Subscriber

 

 Health Maintenance    Policy Number:  Medicare Blue Ppo  Hue France



 Organization (HMO)    [id number]    









 PayID:   [address] [address]









 [place] [address]









 Commercial  Effective:  Policy Number:  [employer]  Hue France



   2011  [id number]    









 Expires: 2018  PayID: [id number]  [address] [id number]









 [place] [address]







Problems







 Description

 

 No Information







Family History







 Date  Family Member(s)  Problem(s)  Comments

 

   General  Hypertension  







Social History







 Type  Date  Description  Comments

 

 Marital Status      

 

 Lives With    nephew  

 

 Occupation    self emplyed  

 

 Cigarette Use    Quit 3 Years Ago  

 

 ETOH Use    Occasionally consumes alcohol  

 

 Smoking    Patient is a former smoker  Quit in 2016

 

 Recreational Drug Use    Denies Drug Use  

 

 Daily Caffeine    Consumes on average 1 pot of  



     regular coffee per day  

 

 Daily Caffeine    consumes chocolate  



     occasionally  

 

 Exercise Type/Frequency    Exercises sporadically  

 

 Exercise Type/Frequency    Knee excercise  strenghting

 

 General Hx Text      Do you follow a special



       diet : no regular diet no



       food avoid Do you have



       problems snoring, daytime



       fatigue:  yes daytime



       fatigue







Allergies, Adverse Reactions, Alerts







 Date  Description  Reaction  Status  Severity  Comments

 

 2017  Morphine    active    

 

 2017  Penicillin    active    







Medications







 Medication  Date  Status  Form  Strength  Qnty  SIG  Indications  Ordering



                 Provider

 

 Percocet  /  Active  Tablets  5-325mg  90tab  1-2 by    Dirk



           s  mouth    Erika,



             every 4-6    M.D.



             hours as    



             needed    



             pain.    



             generic    



             Ok    

 

 Cyclobenzaprine  /  Active  Tablets  10mg  90tab  take 1    Dirk



 HCL          s  tab by    Erika,



             mouth 2-3    M.D.



             times a    



             day as    



             needed    

 

 Colace  /  Active  Capsules  100mg  90cap  1 tab by    Dirk



           s  mouth 2-3    Erika,



             times a    M.D.



             day as    



             needed    

 

 Ondansetron HCL  /  Active  Tablets  4mg  30tab  1 tablet    Dirk



           s  by mouth    Erika,



             q6 hours    M.D.



             as needed    



             nausea    

 

 Xarelto  /  Active  Tablets  10mg  30tab  Take one    Dirk



           s  tablet by    Erika



             mouth    M.D.



             daily    



             after    



             surgery.    

 

 Ranitidine HCL  /  Active  Tablets  300mg    take 1    Wendy



             tablet by    Alyson talley at    MD ARJNU



             bedtime    

 

 Glipizide ER  /  Active  Tablets ER  5mg    1 tablet    Wendy2018    24HR      po daily    Alyson DÍAZ MD



             morning (    



              med    



             change    



             2017    



             decrease    



             2 tab Am)    

 

 Metformin HCL  /  Active  Tablets  1000mg    1 by    Wendy



             mouth    Alyson



             twice a    MD ARJUN



             day    

 

 Pravastatin  /  Active  Tablets  10mg    1 tablet    Wendy



 Sodium  2017          po daily    Alyson



             evening    MD ARJUN

 

 Irbesartan  10/16/  Active  Tablets  75mg    1 by    Wendy



             mouth    Alyson



             every day    MD ARJUN

 

 Fish Oil Omega-3  /  Active  Capsules  1000mg    1 cap po    Unknown



   0000          daily    

 

 Odorless Garlic  /  Active    0.75mg    1 cap po    Unknown



   0000          daily    

 

 Centrum Silver  /  Active  Tablets      1 by    Unknown



   0000          mouth    



             every day    

 

                 

 

 Percocet  /  Hx  Tablets  5-325mg  60tab  1 po    Dirk



   2012  q4-6h prn    Erika,



   /          pain    M.D.



   2017              

 

 Bactrim DS  /  Hx  Tablets  800-160mg  14tab  1 po bid    Dirk



   2012  for 7    Erika,



   /          days    M.D.



   2017              

 

 Coumadin  /  Hx  Tablets  2.5mg  90tab  take 1-3    Dirk



   2012  as    Erika,



   /          directed    M.D.



   2017          at 5pm    



             daily    

 

 Percocet  /  Hx  Tablets  5-325mg  60tab  1-2 tabs    Dirk



    -        s  po q4-6    Erika,



   /          prn pain    M.GARTH



   2017              

 

 Centrum Silver  /  Hx  Tablets  50+Women        Unknown



 50+Women  0000 -              



   2018              







Vital Signs







 Date  Vital  Result  Comment

 

 2018  Height  64 inches  5'4"









 Weight  271.00 lb  

 

 BP Systolic  134 mmHg  

 

 BP Diastolic  88 mmHg  

 

 Respiratory Rate  20 /min  

 

 Body Temperature  97.8 F  

 

 Pain Level  2  

 

 BMI (Body Mass Index)  46.5 kg/m2  









 2018  Height  64 inches  5'4"









 Weight  271.00 lb  

 

 BP Systolic  126 mmHg  

 

 BP Diastolic  80 mmHg  

 

 Respiratory Rate  20 /min  

 

 Body Temperature  97.1 F  

 

 Pain Level  8  

 

 BMI (Body Mass Index)  46.5 kg/m2  









 2018  Height  64 inches  5'4"









 Weight  274.00 lb  with sandals

 

 Heart Rate  82 /min  

 

 BP Systolic  110 mmHg  Rue lg cuff

 

 BP Diastolic  50 mmHg  Rue lg cuff

 

 BP Systolic Sitting  128 mmHg  Lue lg cuff

 

 BP Diastolic Sitting  80 mmHg  Lue lg cuff

 

 BP Systolic Standing  142 mmHg  Lue lg cuff

 

 BP Diastolic Standing  80 mmHg  Lue lg cuff

 

 Respiratory Rate  18 /min  

 

 BMI (Body Mass Index)  47.0 kg/m2  









 2018  Heart Rate  88 /min  









 BP Systolic  140 mmHg  

 

 BP Diastolic  96 mmHg  

 

 Respiratory Rate  16 /min  

 

 Body Temperature  97.9 F  

 

 Pain Level  5  









 2017  Height  63 inches  5'3"









 Weight  250.00 lb  

 

 Heart Rate  91 /min  

 

 BP Systolic  139 mmHg  

 

 BP Diastolic  76 mmHg  

 

 Body Temperature  98.0 F  

 

 BMI (Body Mass Index)  44.3 kg/m2  







Results







 Test  Date  Test  Result  H/L  Range  Note

 

 Type & Screen  2018  Patient Blood Type  A Positive      1









 Antibody Screen  NEGATIVE      1









 Urinalysis Profile  2018  Urine Color  Yellow      









 Urine Appearance  Cloudy      

 

 Urine Specific Gravity  1.008  Low  1.010-1.030  

 

 Urine pH  6.0    5-9  

 

 Urine Urobilinogen  Negative    Negative  

 

 Urine Ketones  Negative    Negative  

 

 Urine Protein  Negative    Negative  

 

 Urine Leukocytes  3+    Negative  

 

 Urine Blood  Negative    Negative  

 

 Urine Nitrite  Negative    Negative  

 

 Urine Bilirubin  Negative    Negative  

 

 Urine Glucose  Negative    Negative  

 

 Urine White Blood Cell  3+(>20/hpf)    Absent  

 

 Urine Red Blood Cell  Absent    Absent  

 

 Urine Bacteria  Absent    Absent  

 

 Urine Squamous Epithelial Cell  Present    Absent  

 

 Urine Renal Epithelial Cells  Present    Absent  









 Inr/Protime  2018  Inr  0.88    0.77-1.02  

 

 Laboratory test finding  2018  Partial Thrombo Time  27.8 seconds    26.0
-36.3  



     PTT        

 

 CBC Auto Diff  2018  White Blood Count  9.0 10^3/uL    3.5-10.8  









 Red Blood Count  4.33 10^6/uL    4.0-5.4  

 

 Hemoglobin  12.5 g/dL    12.0-16.0  

 

 Hematocrit  38 %    35-47  

 

 Mean Corpuscular Volume  87 fL    80-97  

 

 Mean Corpuscular Hemoglobin  29 pg    27-31  

 

 Mean Corpuscular HGB Conc  33 g/dL    31-36  

 

 Red Cell Distribution Width  13 %    10.5-15  

 

 Platelet Count  286 10^3/uL    150-450  

 

 Mean Platelet Volume  8.3 um3    7.4-10.4  

 

 Abs Neutrophils  5.6 10^3/uL    1.5-7.7  

 

 Abs Lymphocytes  2.4 10^3/uL    1.0-4.8  

 

 Abs Monocytes  0.7 10^3/uL    0-0.8  

 

 Abs Eosinophils  0.3 10^3/uL    0-0.6  

 

 Abs Basophils  0 10^3/uL    0-0.2  

 

 Abs Nucleated RBC  0 10^3/uL      

 

 Granulocyte %  61.8 %    38-83  

 

 Lymphocyte %  27.0 %    25-47  

 

 Monocyte %  7.3 %  High  0-7  

 

 Eosinophil %  3.4 %    0-6  

 

 Basophil %  0.5 %    0-2  

 

 Nucleated Red Blood Cells %  0      









 Urine Culture And Sensitivities  2018  Urine Culture  SEE RESULT BELOW  
    2

 

 Type & Screen  2018  Patient Blood Type  A Positive      









 Antibody Screen  NEGATIVE      









 Comp Metabolic Panel  2018  Sodium  138 mmol/L  Low  139-145  









 Potassium  4.4 mmol/L    3.5-5.0  

 

 Chloride  100 mmol/L  Low  101-111  

 

 Co2 Carbon Dioxide  31 mmol/L    22-32  

 

 Anion Gap  7 mmol/L    2-11  

 

 Glucose  112 mg/dL  High    

 

 Blood Urea Nitrogen  13 mg/dL    6-24  

 

 Creatinine  0.76 mg/dL    0.51-0.95  

 

 BUN/Creatinine Ratio  17.1    8-20  

 

 Calcium  9.6 mg/dL    8.6-10.3  

 

 Total Protein  6.8 g/dL    6.4-8.9  

 

 Albumin  4.1 g/dL    3.2-5.2  

 

 Globulin  2.7 g/dL    2-4  

 

 Albumin/Globulin Ratio  1.5    1-3  

 

 Total Bilirubin  0.70 mg/dL    0.2-1.0  

 

 Alkaline Phosphatase  64 U/L      

 

 Alt  19 U/L    7-52  

 

 Ast  12 U/L  Low  13-39  

 

 Egfr Non-  76.4    >60  

 

 Egfr   98.2    >60  3









 Comp Metabolic Panel  2012  Sodium  134 mmol/L  Low  135-145  









 Potassium  4.0 mmol/L    3.5-5.0  

 

 Chloride  100 mmol/L  Low  101-111  

 

 Co2 (Carbon Dioxide)  22.0 mmol/L    22-32  

 

 Anion Gap  12.0 mmol/L  High  2-11  4

 

 Glucose  222 mg/dL  High    

 

 BUN  12 mg/dL    6-24  

 

 Creatinine  0.8 mg/dL    0.50-1.40  

 

 One Over Creatinine  1.25      

 

 BUN/Creatinine Ratio  15.0    8-20  

 

 Calcium  9.8 mg/dL    8.1-9.9  

 

 Total Protein  6.5 GM/DL    6.2-8.1  

 

 Albumin  3.9 GM/DL    3.6-5.4  

 

 Globulin  2.6 GM/DL    2-4  

 

 Albumin/Globulin Ratio  1.5    1-3  

 

 Bilirubin Total  0.7 mg/dL    0.4-1.5  5

 

 Alkaline Phosphatase  85 U/L      

 

 Alt (SGPT)  18 U/L    14-54  

 

 Ast (Sgot)  19 U/L    12-42  

 

 eGFR Non-  73.4    > 60  

 

 eGFR   94.4    > 60  6









 Laboratory test finding  2012  Magnesium  1.9 mg/dL    1.7-2.6  

 

 CBC Auto Diff  2012  White Blood Count  9.3 CUMM    4.8-10.8  









 Red Cell Count  3.92 CUMM  Low  4.2-5.4  

 

 Hemoglobin  11.6 g/dL  Low  12.0-16.0  

 

 Hematocrit  34 %  Low  35-47  

 

 Mean Corpuscular Volume  86 um3    79-97  

 

 Mean Corpuscular Hemoglob  30 pg    27-31  

 

 Mean Corpuscular HGB Cone  34 g/dL    32-36  

 

 Redcell Distribution WDTH  13 %    10.5-15  

 

 Platelet Count  305 CUMM    150-450  

 

 Mean Platelet Volume  8.3 um3    7.4-10.4  

 

 Gran %  52.9 %    38-83  

 

 Lymph %  36.7 %    25-47  

 

 Mononuclear %  7.6 %    1-9  

 

 Eosinophil %  1.9 %    0-6  

 

 Basophil %  0.9 %    0-2  

 

 Abs Lymphs  3.4    1.0-4.8  

 

 Abs Mononuclear  0.7    0-0.8  

 

 Absolute Neutrophil Count  4.9    1.5-7.7  

 

 Abs Eosinophils  0.2    0-0.6  

 

 Abs Basophils  0.1    0-0.2  









 CBC Auto Diff  2012  White Blood Count  8.9 CUMM    4.8-10.8  7









 Red Cell Count  4.61 CUMM    4.2-5.4  7

 

 Hemoglobin  13.9 g/dL    12.0-16.0  7

 

 Hematocrit  40 %    35-47  7

 

 Mean Corpuscular Volume  87 um3    79-97  7

 

 Mean Corpuscular Hemoglob  30 pg    27-31  7

 

 Mean Corpuscular HGB Cone  35 g/dL    32-36  7

 

 Redcell Distribution WDTH  13 %    10.5-15  7

 

 Platelet Count  246 CUMM    150-450  7

 

 Mean Platelet Volume  8.8 um3    7.4-10.4  7

 

 Gran %  71.0 %    38-83  7

 

 Lymph %  20.8 %  Low  25-47  7

 

 Mononuclear %  5.7 %    1-9  7

 

 Eosinophil %  2.2 %    0-6  7

 

 Basophil %  0.3 %    0-2  7

 

 Abs Lymphs  1.8    1.0-4.8  7

 

 Abs Mononuclear  0.5    0-0.8  7

 

 Absolute Neutrophil Count  6.3    1.5-7.7  7

 

 Abs Eosinophils  0.2    0-0.6  7

 

 Abs Basophils  0    0-0.2  7









 Type And Screen (Pre-Adm)  2012  Patient Blood Type  A POSITIVE      7









 Antibody Screen  NEGATIVE      7

 

 Specimen Discard Date  12      7, 8









 Basic Metabolic Panel  2012  Sodium  137 mmol/L    135-145  7









 Potassium  4.2 mmol/L    3.5-5.0  7

 

 Chloride  103 mmol/L    101-111  7

 

 Co2 (Carbon Dioxide)  25.0 mmol/L    22-32  7

 

 Anion Gap  9.0 mmol/L    2-11  7, 9

 

 Glucose  235 mg/dL  High    7

 

 BUN  8 mg/dL    6-24  7

 

 Creatinine  0.7 mg/dL    0.50-1.40  7

 

 One Over Creatinine  1.42      7

 

 BUN/Creatinine Ratio  11.4    8-20  7

 

 Calcium  8.8 mg/dL    8.1-9.9  7

 

 eGFR Non-  85.6    > 60  7

 

 eGFR   110.1    > 60  7, 10









 Urinalysis W/Microscopic  2012  Ua Color  YELLOW    Yellow  7









 Appearance-Urine  CLEAR    Clear  7

 

 Specific Gravity-Ur  1.021    1.010-1.030  7

 

 Esterase-Urine  1+    Negative  7

 

 Nitrite  NEGATIVE    Negative  7

 

 Urobilinogen-Ur-DIP  NEGATIVE    Negative  7

 

 Protein-Urine  NEGATIVE    Negative  7

 

 PH-Urine  5.0    5-9  7

 

 Blood-Urine  NEGATIVE    Negative  7

 

 Ketones-Urine  NEGATIVE    Negative  7

 

 Bilirubin-Ur  NEGATIVE    Negative  7

 

 Glucose-Urine  NEGATIVE    Negative  7

 

 WBC-Urine  3-5    0-5  7

 

 RBC-Urine  0-2    0-2  7

 

 Epith Cells-Ur  OCCASIONAL    None  7

 

 Bacteria-Urine  TRACE    None  7









 Urine Culture & Sensitivi  2012  M  ---------------- <SEE NOTE>      









 1  UNILATERAL PRIMARY OSTEOARTHRITIS, LEFT KNEE

 

 2  SEE RESULT BELOW



   -----------------------------------------------------------------------------
---------------



   Name:  ROMÁNHUE DESIRE                     : 1955    Attend Dr: Trenton James MD



   Acct:  [id number]  Unit: [id number]  AGE: 63            Location:  [facility]



   Re18                        SEX: F             Status:    REG REF



   -----------------------------------------------------------------------------
---------------



   



   SPEC: [id number]         TIFFANY:       [facility] DR: Trenton James MD



   REQ:  [id number]              RECD:   05/07/



   STATUS: COMP



   _



   SOURCE: URINE          SPDESC:



   ORDERED:  Urine Culture



   QUERIES:  Urine Source: Clean Catch



   



   -----------------------------------------------------------------------------
---------------



   Procedure                         Result                         Reported   
        Site



   -----------------------------------------------------------------------------
---------------



   Urine Culture  Final                                             18-
1437      ML



   



   No growth of clinically significant organisms



   



   -----------------------------------------------------------------------------
---------------



   * ML - Main Lab



   .



   



   



   



   



   



   



   



   



   



   



   



   



   



   



   



   



   



   



   



   



   



   



   



   



   



   ** END OF REPORT **



   



   DEPARTMENT OF PATHOLOGY,  [address] [place] [address]



   Phone # [phone]      Fax #[phone]



   Duane Hopkins M.D. Director     [facility] # [id number]

 

 3  *******Because ethnic data is not always readily available,



   this report includes an eGFR for both -Americans and



   non- Americans.****



   The National Kidney Disease Education Program (NKDEP) does



   not endorse the use of the MDRD equation for patients that



   are not between the ages of 18 and 70, are pregnant, have



   extremes of body size, muscle mass, or nutritional status,



   or are non- or non-.



   According to the National Kidney Foundation, irrespective of



   diagnosis, the stage of the disease is based on the level of



   kidney function:



   Stage Description                      GFR(mL/min/1.73 m(2))



   1     Kidney damage with normal or decreased GFR       90



   2     Kidney damage with mild decrease in GFR          60-89



   3     Moderate decrease in GFR                         30-59



   4     Severe decrease in GFR                           15-29



   5     Kidney failure                       <15 (or dialysis)

 

 4  Anion gap measurement may be of limited value in the



   presence of any alkalosis, especially in a combined acid



   base disorder.



   .

 

 5  A metabolite of Naproxen, O-desmethylnaproxen, has been



   shown to interfere with the Jendrassik-Radha method for



   measuring total bilirubin.  Samples from patients who have



   taken Naproxen have shown spurious elevation in total



   bilirubin levels.

 

 6  *******Because ethnic data is not always readily available,



   this report includes an eGFR for both -Americans and



   non- Americans.****



   The National Kidney Disease Education Program (NKDEP) does



   not endorse the use of the MDRD equation for patients that



   are not between the ages of 18 and 70, are pregnant, have



   extremes of body size, muscle mass, or nutritional status,



   or are non- or non-.



   According to the National Kidney Foundation, irrespective of



   diagnosis, the stage of the disease is based on the level of



   kidney function:



   Stage Description                      GFR(mL/min/1.73 m(2))



   1     Kidney damage with normal or decreased GFR       90



   2     Kidney damage with mild decrease in GFR          60-89



   3     Moderate decrease in GFR                         30-59



   4     Severe decrease in GFR                           15-29



   5     Kidney failure                       <15 (or dialysis)

 

 7  AA 12

 

 8  PREADMISSION TESTING SAMPLES FOR BLOOD BANK WILL BE HELD FOR



   14 DAYS FROM THE DATE OF COLLECTION *IF* THE FOLLOWING



   CRITERIA ARE MET:



   



   1) THE PATIENT HAS *NOT* BEEN PREGNANT IN THE LAST 3 MONTHS.



   2) THE PATIENT HAS *NOT* BEEN TRANSFUSED IN THE LAST 3



   MONTHS.



   ============================================================



   PREADMISSION TESTING SAMPLES WILL *NOT* BE HELD FOR 14 DAYS



   FROM PATIENTS WHO IN THE LAST 3 MONTHS:



   



   1) HAVE BEEN PREGNANT



   2) HAVE BEEN TRANSFUSED



   



   THESE PATIENTS *MUST* BE COLLECTED WITHIN 3 DAYS OF THE



   SURGERY DATE.

 

 9  Anion gap measurement may be of limited value in the



   presence of any alkalosis, especially in a combined acid



   base disorder.



   .

 

 10  *******Because ethnic data is not always readily available,



   this report includes an eGFR for both -Americans and



   non- Americans.****



   The National Kidney Disease Education Program (NKDEP) does



   not endorse the use of the MDRD equation for patients that



   are not between the ages of 18 and 70, are pregnant, have



   extremes of body size, muscle mass, or nutritional status,



   or are non- or non-.



   According to the National Kidney Foundation, irrespective of



   diagnosis, the stage of the disease is based on the level of



   kidney function:



   Stage Description                      GFR(mL/min/1.73 m(2))



   1     Kidney damage with normal or decreased GFR       90



   2     Kidney damage with mild decrease in GFR          60-89



   3     Moderate decrease in GFR                         30-59



   4     Severe decrease in GFR                           15-29



   5     Kidney failure                       <15 (or dialysis)

 

 11  ---------------------------------------------------------------------------
-----------------



   RUN DATE: 12               [facility] NMI **LIVE**         
       PAGE 1



   RUN TIME: 903                            Specimen Inquiry



   RUN USER: INTERFACE



   -----------------------------------------------------------------------------
---------------



   Name: HUE FRANCE DESIRE                     Acct#: [id number]      Status: REG REF  
   Re12



   Age/Sex: 57/F                         Unit#: [id number]       Location: 



    : [birth year]



   -----------------------------------------------------------------------------
---------------



   



   SPEC #: [id number]      TIFFANY:      STATUS:  COMP           
REQ #: [id number]



   RECD:      [facility] DR: Trenton James MD



   SOURCE: URINE              ENTR:      [facility] DR: Wendy SPARKS,
Alyson DÍAZ



   [facility]:



   ORDERED:  URINE C   S



   QUERIES:  SPECIMEN DESCRIPTION: URINE, RANDOM



   ACT WKST: UR 12 #1



   -----------------------------------------------------------------------------
---------------



   Procedure                         Result                         Verified   
        Site



   -----------------------------------------------------------------------------
---------------



   > URINE CULTURE   SENSITIVI  Final                                 12-
0903       ML



   SCANT NORMAL URETHRAL OR PERINEAL LEVI



   



   -----------------------------------------------------------------------------
---------------



   ML - [facility] Permit #[id number]



   [address] [facility] [address]        Ph: [phone]  Fax: [phone]



   



   



   



   



   



   



   



   



   



   



   



   



   



   



   



   



   



   



   



   



   



   



   



   



   



   



   



   



   



   -----------------------------------------------------------------------------
---------------



   DEPARTMENT OF PATHOLOGY, [facility] DATES [place], [place] [address]



   Phone #[phone]   Fax #[phone]   [facility] Permit #[id number]



   Duane Hopkins M.D. Director   Wilber Moreau M.D. 



   -----------------------------------------------------------------------------
---------------







Procedures







 Date  CPT Code  Description  Status

 

 2018  79084  TKR Total Knee Replacement  Completed

 

 2018  92266  TKR Total Knee Replacement  Completed

 

 2018  03163  ECHO Transthoracic, Real-Time 2D With Doppler And Color  
Completed



     Flow  

 

 2018  05358  ECHO Transthoracic, Real-Time 2D With Doppler And Color  
Completed



     Flow  

 

 2018  82525  EKG Tracing & Interpretation  Completed

 

 2013  15143  Xray Knee 3 Views  Completed

 

 2013  83540  Rad Exam; Knee, Ap&L  Completed

 

 2012  53895  Xray Knee 3 Views  Completed

 

 2012  14633  Rad Exam; Knee, Ap&L  Completed

 

 2012  35939  TKR Total Knee Replacement  Completed

 

 2012  89124  TKR Total Knee Replacement  Completed

 

 2012  04790  Xray Knee 3 Views  Completed

 

 2012  46614  Rad Exam; Knee, Ap&L  Completed

 

 2011  07155  Rad Exam; Both Knees, Standing Ap  Completed

 

 2011  03368  Rad Exam; Knee, Ap&L  Completed







Encounters







 Type  Date  Location  Provider  CPT E/M  Dx

 

 Office Visit  2018  [facility],  [place]  [address]  
Z47.1



   10:17a  Hospitalists      









 Z96.652

 

 M17.12

 

 M25.562

 

 E11.9









 Office Visit  2018 10:16a  [facility]  [place]  [address]  
Z47.1



     Assoc, Hospitalists      









 Z96.652

 

 M17.12

 

 E11.9

 

 I10

 

 E78.5









 Office Visit  2018 11:45a  [facility]  [place],  
[address]  I10



     [facility]  M.GARTH    









 E11.9

 

 R94.31

 

 Z01.810

 

 E66.9

 

 E78.4









 Office Visit  2018 10:45a  [facility]  Trenton James M.D.  
[address]  M17.12



     C.M.A.      

 

 Office Visit  2017  3:58p  [facility],  Arturo Laguna,  
[address]  N12



     Hospitalists  M.D.    









 E11.9

 

 R10.9

 

 I10









 Office Visit  2017  3:57p  [facility],  Jeanne Birmingham NP  
[address]  N12



     Hospitalists      









 E11.9

 

 R10.9

 

 I10









 Office Visit  2017 10:00a  [facility]  rTenton James M.D.  
[address]  M17.12



     C.M.A.      

 

 Office Visit  2013  9:00a  [facility]  Trenton James M.D.  
[address]  715.96



     C.M.A.      

 

 Office Visit  2012  8:00a  [facility] Of  Toya Mantilla  [id number]  
[phone]



     C.M.A.  RPA-C    

 

 Office Visit  2012  9:15a  [facility] M.D.  
[id number]  [phone]



     C.M.A.      

 

 Office Visit  2011  8:15a  [facility]  [place] [facility] M.D.  
[id number]  [phone]



     C.M.A.      

 

 Office Visit  2011  9:30a  [facility]  [place] M.D.  
[id number]  [phone]



     C.MSUKHDEEP      







Plan of Care

2018 - [place] James M.D.[id number] Presence of left artificial knee 
jointFollow up:Follow up:   1-3 months  Use your ice pad as needed.  Use less 
narcotic at bedtime as soon as able  Keep exercising for knee straightening, 
bending, and strengthening  Use the cane as needed,  if increasing pains use 
the walker

## 2018-08-02 NOTE — XMS REPORT
Hue Du

 Created on:2018



Patient:Hue Du

Sex:Female

:1955

External Reference #:[id number]





Demographics







 Address  [address] [id number]

 

 Home Phone  [phone]

 

 Mobile Phone  [phone]

 

 Preferred Language  English

 

 Marital Status  Not  Or 

 

 Advent Affiliation  Unknown

 

 Race  White

 

 Ethnic Group  Not  Or 









Author







 Organization  [facility]

 

 Address  [address] [place] [address]

 

 Phone  [phone]









Support







 Name  Relationship  Address  Phone

 

 Steven Fofana  Niece/Nephew  [address] [facility]  [phone]



     [place] [id number]  









Care Team Providers







 Name  Role  Phone

 

 Alyson Nguyen  Care Team Information   Unavailable

 

 Alyson Nguyen  Primary Care Physician  Unavailable









Payers







 Type  Date  Identification Numbers  Payment Provider  Subscriber

 

 Commercial  Effective:  Policy Number: [id number]  Medicare Blue Ppo  Hue Du



   2018      









 Group Number: [id number]  [address] [address]

 

 PayID: [id number]  [place] [address]







Problems







 Date  Description  Provider  Status

 

 Onset: 2011  Arthralgia of the lower leg  Alyson Nguyen M.D.  Active

 

 Onset: 2012  Type 2 diabetes mellitus  Alyson Nguyen M.D.  Active

 

 Onset: 2012  Embolism from thrombosis of vein of  Alyson Nguyen M.D.  Active



   distal lower extremity    

 

 Onset: 2012  Degenerative joint disease involving  Alyson Nguyen M.D.  Active



   multiple joints    

 

 Onset: 2012  Symptom of skin and integumentary  Alyson Nguyen M.D.  
Active



   tissue    

 

 Onset: 04/15/2013  Type II diabetes mellitus  Alyson Nguyen M.D.  Active



   uncontrolled    

 

 Onset: 04/15/2013  Keloid scar  Alyson Nguyen M.D.  Active

 

 Onset: 2013  Tobacco user  Alyson Nguyen M.D.  Active

 

 Onset: 10/12/2015  Type II diabetes mellitus  Alyson Nguyen M.D.  Active



   uncontrolled    







Family History







 Date  Family Member(s)  Problem(s)  Comments

 

   General  Paternal siblings with Colon CANo fam hx  



     MI.stroke,DM, Lung, Breast CA.  

 

   Father   82, old age. Pacemaker.  

 

   Mother   60's MVA.  Had been healthy.  

 

   Number of Children  None  

 

   Number of Siblings  6 siblings.   Sister with heart diseaseNo cancer or  



     diabetes2 uncles and an aunt had colon cancer  







Social History







 Type  Date  Description  Comments

 

 Education    Highest level of education  



     completed is 12th grade  

 

 Marital Status    Patient is   

 

 Living Situation     no children.  

 

 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  

 

 Cigarette Use    Former Cigarette Smoker   QUIT  OCTOBER 3, 2014.



       light smoker for 10



       years.

 

 ETOH Use    Occasional  on Sundays, a couple of



       glasses of wine.

 

 Smoking    Patient is a former smoker  is on chantix.

 

 Daily Caffeine    Consumes on average 3 cups  non dairy creamer.



     of coffee per day  

 

 Exercise Type/Frequency    exercising knee. uses  



 Current    bicycle.  treadmill - up to  



     6 minutes daily.  

 

 Seat Belt/Car Seat    Always uses a seat belt  







Allergies, Adverse Reactions, Alerts







 Date  Description  Reaction  Status  Severity  Comments

 

 2011  Penicillins    active    Flu Like Symptoms

 

 2012  Lisinopril  psychological - got very  active    



     "mean" and argumentative      

 

 08/10/2012  Morphine  GI upset  active    

 

 2018  Aspirin  internal bleeding  active    internal bleeding.







Medications







 Medication  Date  Status  Form  Strength  Qnty  SIG  Indications  Ordering



                 Provider

 

 Blood Pressure  /  Active  Kit    1unit  Take blood  I10  Shahida Bettencourt          s  pressure    Eid,



             every    NP



             morning    



             with feet    



             flat on the    



             floor and    



             as needed    



             for    



             headache,    



             dizziness    

 

 Irbesartan  /  Active  Tablets  150mg  30tab  1 by mouth  E11.65  Alyson Thakur        s  every day    ELIZABETH Nguyen

 

 Ranitidine HCL  /  Active  Tablets  300mg  90tab  take one  K21.9  Alyson Thakur        s  tablet by    gerri Nguyen at    M.D.



             bedtime    

 

 Proair HFA  /  Active  Aerosol  108(90Bas  8.500  2 puffs  R05  Dia



         e)  gm  every 4    Hi,



         mcg/Act    hours as    FNP



             needed    

 

 Hydrocodone-Acet  /  Active  Tablets  7.5-325mg  120ta  1 by mouth  
M79.605  Alyson L.



 aminophen  2016        bs  four times    Wendy,



             daily    M.DKrysten

 

 Lorazepam  /  Active  Tablets  1mg  30tab  take  F43.21  Shahida Trish



   2016        s  one-half or    Eid,



             one tablet    NP



             by mouth at    



             at bedtime    



             for sleep.    

 

 Glipizide  /  Active  Tablets  5mg  60tab  2 by mouth    Alyson DÍAZ



   2015        s  in the    Wendy,



             morning.    M.DKrysten

 

 Freestyle  /  Active  Misc    1Box  test every    Alyson DÍAZ



 Lancets  2015          day dx:    Wendy



             250.00,    M.DKrysten



             last visit    



             3/9/15    

 

 Freestyle Lite  /  Active  Device    1unit  testing    Alyson DÍAZ



 Blood Glucose          s  every day    Wendy,



 Monitoring            or as    M.DKrysten



 System            directed,    



             dx 250.00,    



             last visit    



             3/09/15    

 

 Freestyle Lite  /  Active  Strips    1box  test once    Alyson DÍAZ



 Test  2015          daily dx:    Wendy



             250.00,    M.DKrysten



             last visit    



             3/9/15    

 

 Metformin HCL  /  Active  Tablets  1000mg  60tab  1 by mouth    Alyson DÍAZ



   2015        s  twice a day    ELIZABETH Nguyen

 

 Pravastatin  10/02/  Active  Tablets  10mg  90tab  1 by mouth  E11.65  Alyson DÍAZ



 Sodium          s  at night    ELIZABETH Nguyen

 

 Centrum Silver  /  Active  Tablets  Adult 50    one by    Unknown



 Adult 50+  0000          mouth daily    

 

 Garlic Oil  /  Active  Capsules  1000mg    One by    Unknown



   0000          mouth once    



             a day    

 

 Omega 3  /  Active  Capsules  1000mg    1 by mouth    Unknown



   0000          once daily    

 

                 

 

 Cholestyramine  /  Hx  Powder  4GM/Dose  378gm  2 grams  R19.7  Alyson DÍAZ



   2017 -          daily in 1    Wendy,



   /          cup of    M.D.



   2017          water, work    



             up to 4    



             grams in    



             water/liqui    



             d daily    

 

 Azithromycin  /  Hx  Tablets  250mg  7tabs  2 take by  MEL Alvares



    -          mouth    Hi,



   /          tabletstoda    FNP



   2017          y,then one    



             tab days    



             2-5 until    



             finished    

 

 Gabapentin  /  Hx  Capsules  300mg  180ca  take one  M76.32  Alyson DÍAZ



    -        ps  capsule by    Wendy



   /          mouth every    M.D.



   2016          morning and    



             1 in the    



             evening    

 

 Xifaxan  /  Hx  Tablets  200mg  21tab  1 by mouth  K58.0  Alyson DÍAZ



    -        s  three times    Wendy,



   /          daily x 7    M.D.



   2016          days    

 

 Physical Therapy  /  Hx        iliotibial  M76.32  Alyson DÍAZ



   2016 -          band    Wendy,



   /          syndrome.    M.D.



   2016              

 

 Guaifenesin ER  /  Hx  Tablets  600mg  30tab  1 po bid  J18.9  Ale



   2016 -    ER 12HR    s      Thompson,



   /              Afnp-C



   2016              

 

 Vitamin D  12/10/  Hx  Capsules  51540Ocvy  12cap  take 1    Alyson DÍAZ



 (Ergocalciferol)   -        s  capsule by    Wendy,



   /          mouth once    M.D.



   2016          monthly    

 

 Mobic  10/12/  Hx  Tablets  7.5mg  30tab  1 by mouth  M15.0  Alyson DÍAZ



    -        s  every    Wendy,



   /          morning.    M.D.



   2016              

 

 Flexeril  /  Hx  Tablets  10mg  30tab  1 po tid  728.85  Anaid



    -        s  prn    Juliet,



   /              Afnp-C



   2015              

 

 Chantix  10/16/  Hx  Tablets  1mg  60tab  1 by mouth  305.1  Alyson DÍAZ



    -        s  bid.    Wendy,



   /              M.D.



                 

 

 Metformin HCL  10/02/  Hx  Tablets  500mg  180ta  1 by mouth  250.02  Dia



    -          twice    Hi,



   /          daily.    FNP



                 

 

 Chantix Starting  /  Hx  Tablets  0.5mg X  1pack  use as  305.1  Rain



 Month Jef  2014 & 1 mg    directed    GIBSON Catherine



   /      X 42        



                 

 

 Clarithromycin  /  Hx  Tablets  500mg  20tab  1 po bid x  461.0  Rain



    -        s  10 days    GIBSON Catherine



   2014              

 

 Januvia  /  Hx  Tablets  100mg  30tab  take one  250.02  Dia



   2013  tablet by    Hi,



   /          mouth one    FNP



   2015          time daily    

 

 Chantix Starter  /  Hx      1unit  o.5 mg  305.1  Alyson Suarez   -        s  daily x 3    Wendy,



   /          days.  0.5    M.D.



   2013          mg bid day    



             4-7.  1 mg    



             po bid    



             thereafter.    

 

 Chantix  /  Hx  Tablets  0.5mg  93tab  1 po daily  305.1  Alyson DÍAZ



    -        s  x 3 days. 1    Wendy,



   /          po bid days    M.D.



             4-7 2 po    



             bid daily    



             thereafter    

 

 Chantix  /  Hx  Tablets  1mg  60tab  1 po bid.  305.1  Alyson DÍAZ



   2013  after    Wendy,



   /          finished    M.D.



             with    



             starter    



             pack.    

 

 Irbesartan  /  Hx  Tablets  75mg  30tab  1 by mouth  E11.65  Alyson DÍAZ



    -        s  every day    Wendy,



   /              M.D.



                 

 

 Griselda Contour  /  Hx  Strips    100un  test as    Alyson DÍAZ



 Blood Glucose   -        its  directed    Wendy,



 Test Strips  /          twice a day    M.D.



                 

 

 Metformin HCL  /  Hx  Tablets  1000mg  60tab  take one  250.02  Hue



    -        s  tablet by    Cherelle,



   /          mouth twice    FNP



             a day    

 

 Azithromycin  /  Hx  Tablets  500mg  5tabs  1 po qd x  461.0  Hue



    -          5d    Cherelle,



   /              FNP



                 

 

 Azithromycin  10/10/  Hx  Tablets  250mg  12tab  take 2  461.0  Hue



    -        s  tablets by    Cherelle,



   /          mouth  x 3d    FNP



             then take 1    



             tablet    



             daily for    



             next 6 days    

 

 Proair HFA  10/10/  Hx  Aerosol  108(90Bas  1unit  2 puffs q  461.0  Hue



    -      e) mcg/ac  s  3-4h prn    Cherelle,



   /          cough/wheez    FNP



             e/sob    

 

 Robitussin A-C  10/10/  Hx      4Oz  1-2 tsp po  461.0  Hue



   2012 -          q4h prn    Cherelle,



   /          cough    FNP



                 

 

 Hydrocodone/Acet  /  Hx  Tablets  2.5-500mg  30tab  1 po at hs  715.00  
Alyson DÍAZ



 aminophen   -        s      Wendy,



   10/10/              M.D.



                 

 

 Lorazepam  /  Hx  Tablets  1mg  30tab  1 po tid    Wendy,



   2012  for leg    Alyson DÍAZ



   /          crashanainge    



   2012              

 

 Hydrocodone/Acet  /  Hx  Tablets  5-500mg  60tab  1 po bid  719.46  
Alyson DÍAZ



 aminophen   -        s      Wendy,



   08/10/              M.D.



                 

 

 Warfarin Sodium  /  Hx  Tablets  5mg  90tab  2 po qd or    Alyson DÍAZ



   2012        s  as directed    Wendy,



   08/10/              M.D.



                 

 

 Flexeril  /  Hx  Tablets  10mg  60tab  1 po bid  728.85  Alyson DÍAZ



   2012      Wendy,



                 M.D.



                 

 

 Oxycodone/Acetam  /  Hx  Tablets  5-325mg  90tab  1-2 po q4  453.40  
Alyson DÍAZ



 inophen   -        s  hrs prn    eWndy,



   /          pain    M.D.



                 

 

 Lovenox  /  Hx  Solution  120mg/0.8  3unit  inject sq    Alyson DÍAZ



   2012  s  bid    Wendy,



   /              M.D.



                 

 

 Lisinopril  /  Hx  Tablets  5mg  30tab  1 po qd    Alyson DÍAZ



    -        s      Wendy,



   /              M.D.



                 

 

 Contour Blood  /  Hx      1Box  use to  250.00  Alyson DÍAZ



 Test Strips   -          measure    Wendy,



   /          blood sugar    M.D.



             bid    

 

 Lancets  /  Hx      200un  test blood  250.00  Alyson DÍAZ



    -        its  sugar twice    Wendy,



   /          daily mail    M.D.



             to patient.    

 

 Metformin HCL  /  Hx  Tablets  500mg  100ta  1 po bid  250.00  Alyson DÍAZ



    -        bs      Wendy,



   /              M.D.



   2013              

 

 Chantix Starter  /  Hx      1unit  o.5 mg  305.1  Alyson Suarez   -        s  daily x 3    Wendy,



   /          days.  0.5    M.D.



   2012          mg bid day    



             4-7.  1 mg    



             po bid    



             thereafter.    

 

 Lorazepam  /  Hx  Tablets  1mg  30tab  1 po tid    Unknown



    -        s  for leg    



   /          crampinge    



                 

 

 Fish Oil  /  Hx  Capsules  1200mg    1 po qd    Unknown



   0000 -    DR          



   2017              







Immunizations







 CPT Code  Status  Date  Vaccine  Lot #

 

 78694  Given  2013  Pneumococcal Immunization  [id number]

 

 58901  Given  04/15/2013  Tdap Tetanus, W Pertussis  [id number]







Vital Signs







 Date  Vital  Result  Comment

 

 2018  BP Systolic  124 mmHg  









 BP Diastolic  68 mmHg  

 

 Heart Rate  74 /min  

 

 Body Temperature  98.0 F  

 

 Respiratory Rate  17 /min  

 

 Height  63.5 inches  5'3.50"

 

 Weight  267.50 lb  

 

 BMI (Body Mass Index)  46.6 kg/m2  









 2018  BP Systolic  140 mmHg  









 BP Diastolic  70 mmHg  

 

 Heart Rate  92 /min  

 

 Body Temperature  97.9 F  

 

 Respiratory Rate  18 /min  

 

 Height  63.5 inches  5'3.50"

 

 Weight  267.00 lb  

 

 BMI (Body Mass Index)  46.5 kg/m2  









 2018  BP Systolic  162 mmHg  









 BP Diastolic  90 mmHg  

 

 Heart Rate  76 /min  

 

 Body Temperature  98.6 F  

 

 Respiratory Rate  16 /min  

 

 Height  63.5 inches  5'3.50"

 

 Weight  276.00 lb  

 

 BMI (Body Mass Index)  48.1 kg/m2  









 2018  BP Systolic  140 mmHg  









 BP Diastolic  78 mmHg  

 

 Heart Rate  64 /min  

 

 Body Temperature  98.0 F  

 

 Respiratory Rate  18 /min  

 

 Height  63.5 inches  5'3.50"

 

 Weight  266.00 lb  

 

 BMI (Body Mass Index)  46.4 kg/m2  









 2017  BP Systolic  145 mmHg  









 BP Diastolic  86 mmHg  

 

 Heart Rate  80 /min  

 

 Body Temperature  97.7 F  

 

 Height  63.5 inches  5'3.50"

 

 Weight  255.00 lb  

 

 BMI (Body Mass Index)  44.5 kg/m2  









 2017  BP Systolic  130 mmHg  









 BP Diastolic  82 mmHg  

 

 Heart Rate  72 /min  

 

 Body Temperature  97.9 F  

 

 Respiratory Rate  16 /min  

 

 Height  63.5 inches  5'3.50"

 

 Weight  255.00 lb  

 

 BMI (Body Mass Index)  44.5 kg/m2  









 2017  BP Systolic  120 mmHg  









 BP Diastolic  80 mmHg  

 

 Heart Rate  88 /min  

 

 Body Temperature  98.3 F  

 

 Respiratory Rate  18 /min  

 

 Height  63.5 inches  5'3.50"

 

 Weight  253.00 lb  

 

 BMI (Body Mass Index)  44.1 kg/m2  









 2017  BP Systolic  142 mmHg  









 BP Diastolic  80 mmHg  

 

 Heart Rate  78 /min  

 

 Body Temperature  98.2 F  

 

 Respiratory Rate  16 /min  

 

 Height  63.5 inches  5'3.50"

 

 Weight  261.00 lb  

 

 BMI (Body Mass Index)  45.5 kg/m2  









 2016  BP Systolic  150 mmHg  









 BP Diastolic  80 mmHg  

 

 Heart Rate  96 /min  

 

 Body Temperature  98.6 F  

 

 Height  63.5 inches  5'3.50"

 

 Weight  260.00 lb  

 

 BMI (Body Mass Index)  45.3 kg/m2  









 2016  BP Systolic  156 mmHg  









 BP Diastolic  74 mmHg  

 

 Heart Rate  78 /min  

 

 Body Temperature  98.6 F  

 

 Respiratory Rate  16 /min  

 

 Height  63.5 inches  5'3.50"









 2016  BP Systolic  156 mmHg  









 BP Diastolic  80 mmHg  

 

 Heart Rate  78 /min  

 

 Body Temperature  98.1 F  

 

 Respiratory Rate  16 /min  

 

 Height  63.5 inches  5'3.50"

 

 Weight  272.00 lb  

 

 BMI (Body Mass Index)  47.4 kg/m2  









 2016  BP Systolic  134 mmHg  









 BP Diastolic  80 mmHg  

 

 Heart Rate  66 /min  

 

 Body Temperature  98.6 F  

 

 Respiratory Rate  16 /min  

 

 Height  63.5 inches  5'3.50"

 

 Weight  273.50 lb  

 

 BMI (Body Mass Index)  47.7 kg/m2  









 2016  BP Systolic  150 mmHg  









 BP Diastolic  80 mmHg  

 

 Heart Rate  88 /min  

 

 Body Temperature  98.2 F  

 

 Respiratory Rate  18 /min  

 

 O2 % BldC Oximetry  99 %  

 

 Height  63.5 inches  5'3.50"

 

 Weight  273.00 lb  

 

 BMI (Body Mass Index)  47.6 kg/m2  









 12/10/2015  BP Systolic  120 mmHg  









 BP Diastolic  82 mmHg  

 

 Heart Rate  72 /min  

 

 Body Temperature  97.9 F  

 

 Respiratory Rate  16 /min  

 

 Height  63.5 inches  5'3.50"

 

 Weight  265.00 lb  

 

 BMI (Body Mass Index)  46.2 kg/m2  









 10/12/2015  BP Systolic  136 mmHg  









 BP Diastolic  66 mmHg  

 

 Heart Rate  90 /min  

 

 Body Temperature  98.8 F  

 

 Respiratory Rate  16 /min  

 

 Height  63.5 inches  5'3.50"

 

 Weight  267.25 lb  

 

 BMI (Body Mass Index)  46.6 kg/m2  









 2015  BP Systolic  120 mmHg  









 BP Diastolic  70 mmHg  

 

 Heart Rate  80 /min  

 

 Body Temperature  98.5 F  

 

 Respiratory Rate  18 /min  

 

 Height  63.5 inches  5'3.50"

 

 Weight  268.00 lb  

 

 BMI (Body Mass Index)  46.7 kg/m2  









 2015  BP Systolic  142 mmHg  









 BP Diastolic  84 mmHg  

 

 Heart Rate  92 /min  

 

 Body Temperature  98.1 F  

 

 Height  63.5 inches  5'3.50"

 

 Weight  267.25 lb  

 

 BMI (Body Mass Index)  46.6 kg/m2  









 2015  BP Systolic  150 mmHg  









 BP Diastolic  84 mmHg  

 

 Heart Rate  66 /min  

 

 Body Temperature  97.8 F  

 

 Respiratory Rate  18 /min  

 

 Height  63.5 inches  5'3.50"

 

 Weight  263.00 lb  

 

 BMI (Body Mass Index)  45.9 kg/m2  









 2014  BP Systolic  138 mmHg  









 BP Diastolic  70 mmHg  

 

 Heart Rate  78 /min  

 

 Body Temperature  98.3 F  

 

 Respiratory Rate  16 /min  

 

 Height  63.5 inches  5'3.50"

 

 Weight  254.00 lb  

 

 BMI (Body Mass Index)  44.3 kg/m2  









 10/02/2014  BP Systolic  140 mmHg  









 BP Diastolic  80 mmHg  

 

 Heart Rate  70 /min  

 

 Body Temperature  97.2 F  

 

 Respiratory Rate  18 /min  

 

 Height  63.5 inches  5'3.50"

 

 Weight  248.00 lb  

 

 BMI (Body Mass Index)  43.2 kg/m2  









 2014  BP Systolic  140 mmHg  









 BP Diastolic  80 mmHg  

 

 Heart Rate  68 /min  

 

 Body Temperature  98.5 F  

 

 Respiratory Rate  18 /min  

 

 Height  63.5 inches  5'3.50"

 

 Weight  245.00 lb  

 

 BMI (Body Mass Index)  42.7 kg/m2  









 2014  BP Systolic  144 mmHg  









 BP Diastolic  90 mmHg  

 

 Heart Rate  80 /min  

 

 Body Temperature  98.0 F  

 

 Respiratory Rate  18 /min  

 

 Height  63.5 inches  5'3.50"

 

 Weight  254.00 lb  

 

 BMI (Body Mass Index)  44.3 kg/m2  









 2013  BP Systolic  146 mmHg  









 BP Diastolic  80 mmHg  

 

 Heart Rate  92 /min  

 

 Body Temperature  97.6 F  

 

 Respiratory Rate  16 /min  

 

 Height  63.5 inches  5'3.50"

 

 Weight  256.00 lb  

 

 BMI (Body Mass Index)  44.6 kg/m2  









 2013  BP Systolic  136 mmHg  









 BP Diastolic  80 mmHg  

 

 Heart Rate  84 /min  

 

 Body Temperature  98.9 F  

 

 Respiratory Rate  18 /min  

 

 Height  63.5 inches  5'3.50"









 2013  BP Systolic  130 mmHg  









 BP Diastolic  72 mmHg  

 

 Heart Rate  80 /min  

 

 Body Temperature  99.1 F  

 

 Height  63.5 inches  5'3.50"









 04/15/2013  BP Systolic  156 mmHg  









 BP Diastolic  86 mmHg  

 

 Heart Rate  78 /min  

 

 Body Temperature  98.5 F  

 

 Respiratory Rate  18 /min  

 

 Height  63.5 inches  5'3.50"

 

 Weight  265.12 lb  

 

 BMI (Body Mass Index)  46.2 kg/m2  









 2013  BP Systolic  130 mmHg  









 BP Diastolic  80 mmHg  

 

 Heart Rate  80 /min  

 

 Body Temperature  98.6 F  

 

 Respiratory Rate  16 /min  

 

 Height  63.5 inches  5'3.50"

 

 Weight  267.00 lb  

 

 BMI (Body Mass Index)  46.5 kg/m2  









 10/10/2012  BP Systolic  120 mmHg  









 BP Diastolic  88 mmHg  

 

 Heart Rate  92 /min  

 

 Body Temperature  99.2 F  

 

 Height  63.5 inches  5'3.50"

 

 Weight  263.00 lb  

 

 BMI (Body Mass Index)  45.9 kg/m2  









 08/10/2012  BP Systolic  140 mmHg  









 BP Diastolic  80 mmHg  

 

 Heart Rate  80 /min  

 

 Body Temperature  98.0 F  

 

 Height  63.5 inches  5'3.50"

 

 Weight  262.00 lb  

 

 BMI (Body Mass Index)  45.7 kg/m2  









 2012  BP Systolic  142 mmHg  









 BP Diastolic  78 mmHg  

 

 Heart Rate  76 /min  

 

 Body Temperature  98.6 F  

 

 Height  63.5 inches  5'3.50"

 

 Weight  254.00 lb  

 

 BMI (Body Mass Index)  44.3 kg/m2  









 2012  BP Systolic  138 mmHg  









 BP Diastolic  78 mmHg  

 

 Heart Rate  88 /min  

 

 Body Temperature  98.2 F  

 

 Height  63.5 inches  5'3.50"

 

 Weight  251.00 lb  

 

 BMI (Body Mass Index)  43.8 kg/m2  









 2012  BP Systolic  120 mmHg  









 BP Diastolic  80 mmHg  

 

 Heart Rate  68 /min  

 

 Body Temperature  98.7 F  

 

 Height  63.5 inches  5'3.50"









 2012  BP Systolic  120 mmHg  









 BP Diastolic  80 mmHg  

 

 Heart Rate  80 /min  

 

 Body Temperature  99.0 F  

 

 Height  63.5 inches  5'3.50"









 2012  BP Systolic  120 mmHg  









 BP Diastolic  80 mmHg  

 

 Heart Rate  68 /min  

 

 Body Temperature  98.2 F  

 

 Height  63.5 inches  5'3.50"

 

 Weight  261.00 lb  

 

 BMI (Body Mass Index)  45.5 kg/m2  









 2012  BP Systolic  120 mmHg  









 BP Diastolic  70 mmHg  

 

 Heart Rate  72 /min  

 

 Height  63.5 inches  5'3.50"

 

 Weight  261.00 lb  

 

 BMI (Body Mass Index)  45.5 kg/m2  









 2012  BP Systolic  118 mmHg  









 BP Diastolic  84 mmHg  

 

 Heart Rate  84 /min  

 

 Body Temperature  98.4 F  

 

 Height  63.5 inches  5'3.50"

 

 Weight  261.00 lb  

 

 BMI (Body Mass Index)  45.5 kg/m2  









 2012  BP Systolic  110 mmHg  









 BP Diastolic  78 mmHg  

 

 Heart Rate  88 /min  

 

 Body Temperature  98.2 F  

 

 Height  63.5 inches  5'3.50"

 

 Weight  258.00 lb  

 

 BMI (Body Mass Index)  45.0 kg/m2  









 2011  BP Systolic  120 mmHg  









 BP Diastolic  70 mmHg  

 

 Heart Rate  80 /min  

 

 Body Temperature  98.8 F  

 

 Height  63.5 inches  5'3.50"

 

 Weight  258.00 lb  

 

 BMI (Body Mass Index)  45.0 kg/m2  







Results







 Test  Date  Test  Result  H/L  Range  Note

 

 Comprehensive Metabolic Prof  2018  Sodium  139 mEq/L    134-149  









 Potassium  4.6 mEq/L    3.6-5.5  

 

 Chloride  103 mEq/L      

 

 Carbon Dioxide  25 mEq/L    21-32  

 

 Glucose  221 mg/dL  High    

 

 BUN  15 mg/dL    6-26  

 

 Creatinine  0.7 mg/dL    0.6-1.4  

 

 BUN/Creat Ratio  21.4 CALC    8.0-36.0  

 

 Calcium  9.1 mg/dL    8.6-10.2  

 

 Total Protein  6.5 g/dL    6.4-8.3  

 

 Albumin  4.1 g/dL    3.8-5.5  

 

 Globulin  2.4 g/dL    2.0-4.8  

 

 A/G Ratio  1.7 CALC    0.6-2.3  

 

 Alk. Phosphatase  79 U/L      

 

 Alt (SGPT)  12 U/L    7-35  

 

 Ast (Sgot)  8 U/L    5-34  

 

 Total Bilirubin  0.7 mg/dL    0.2-1.3  

 

 GFR Non-African American  >60 ml/min/1.73m^    >=60  

 

 GFR African American  >60 ml/min/1.73m^    >=60  









 Laboratory test finding  2018  TSH  2.52 mIU/L    0.50-6.00  









 Free T4  1.09 ng/dL    0.75-1.54  









 CBC Electronic Fma  2018  WBC  6.6 x10^3/UL    4.0-10.0  









 RBC  3.88 x10^6/UL  Low  3.93-6.00  

 

 HGB  11.0 g/dL  Low  12.0-17.0  

 

 HCT  34 %  Low  35-50  

 

 MCV  88.1 fL    80.0-95.0  

 

 MCH  28.4 pg    25.6-32.2  

 

 MCHC  32.2 g/dL    32.2-36.0  

 

 RDW-CV  12.9 %    11.6-14.4  

 

 PLT  291 x10^3/UL    163-400  

 

 MPV  10.3 fL    9.4-12.4  

 

 Favio#  3.81 x10^3/UL    1.56-6.13  

 

 Lymph#  2.00 x10^3/UL    1.18-3.74  

 

 Mono#  0.42 x10^3/UL    0.24-0.82  

 

 Eos #  0.3 x10^3/UL    0.0-0.5  

 

 Baso #  0.06 x10^3/UL    0.01-0.08  

 

 Favio%  57.7 %    34.0-70.0  

 

 Lymph %  30.3 %    20.0-52.0  

 

 Mono%  6.4 %    5.0-12.0  

 

 Eos%  4.7 %    0.7-7.0  

 

 Baso%  0.9 %    0.1-1.2  









 Laboratory test finding  2018  Hemoglobin A1c ([facility])  7.2 %  High  4.1-
5.7  

 

 Comprehensive Metabolic Prof  2018  Sodium  135 mEq/L    134-149  









 Potassium  4.2 mEq/L    3.6-5.5  

 

 Chloride  98 mEq/L      

 

 Carbon Dioxide  25 mEq/L    21-32  

 

 Glucose  153 mg/dL  High    

 

 BUN  12 mg/dL    6-26  

 

 Creatinine  0.7 mg/dL    0.6-1.4  

 

 BUN/Creat Ratio  17.1 CALC    8.0-36.0  

 

 Calcium  9.4 mg/dL    8.6-10.2  

 

 Total Protein  6.7 g/dL    6.4-8.3  

 

 Albumin  4.3 g/dL    3.8-5.5  

 

 Globulin  2.4 g/dL    2.0-4.8  

 

 A/G Ratio  1.8 CALC    0.6-2.3  

 

 Alk. Phosphatase  74 U/L      

 

 Alt (SGPT)  26 U/L    7-35  

 

 Ast (Sgot)  13 U/L    5-34  

 

 Total Bilirubin  0.7 mg/dL    0.2-1.3  

 

 GFR Non-African American  >60 ml/min/1.73m^    >=60  

 

 GFR African American  >60 ml/min/1.73m^    >=60  









 Lipid Profile  2018  Cholesterol  224 mg/dL  High  120-200  









 Triglycerides  202 mg/dL  High    

 

 HDL Cholesterol  75 mg/dL    30-85  

 

 LDL (Calculated)  109 CALC    0-129  

 

 VLDL Cholesterol  40 mg/dL    0-50  

 

 HDL Risk Factor  3.0 CALC    0.0-4.4  









 Laboratory test finding  2018  Hemoglobin A1c ([facility])  7.4 % %  High  4.1-
5.7  

 

 Urinalysis Profile  2018  Urine Color  Yellow      









 Urine Appearance  Clear      

 

 Urine Specific Gravity  1.017    1.010-1.030  

 

 Urine pH  5.0    5-9  

 

 Urine Urobilinogen  Negative    Negative  

 

 Urine Ketones  Negative    Negative  

 

 Urine Protein  Negative    Negative  

 

 Urine Leukocytes  Trace    Negative  

 

 Urine Blood  Negative    Negative  

 

 Urine Nitrite  Negative    Negative  

 

 Urine Bilirubin  Negative    Negative  

 

 Urine Glucose  Negative    Negative  

 

 Urine White Blood Cell  Trace(0-5/hpf)    Absent  

 

 Urine Red Blood Cell  Trace(0-2/hpf)    Absent  

 

 Urine Bacteria  Absent    Absent  

 

 Urine Squamous Epithelial Cell  Present    Absent  









 Laboratory test  2018  Urine Culture And  SEE RESULT BELOW      1



 finding    Sensitivities        

 

 Laboratory test  2017  Hemoglobin A1c ([facility])  6.6 %  High  4.1-5.7  



 finding            

 

 Laboratory test  2017  Surgical Pathology  SEE RESULT BELOW      2



 finding            

 

 Ua - Micro ([facility])  2017  Appearance  clear      









 Color  yellow      

 

 Glucose, Urine ([facility])  neg      

 

 Bilirubin  neg      

 

 Ketones  neg      

 

 SP Grav  <=1.005      

 

 Blood  neg      

 

 PH  5.5      

 

 Protein  neg      

 

 Urobil  0.2      

 

 Nitrite  neg      

 

 Leukocytes ([facility])  small      

 

 WBC ([facility])  4-6      









 Laboratory test finding  2017  Potassium Redraw  4.1 mmol/L    3.5-5.0  









 Ast Redraw  9 U/L  Low  13-39  









 CBC Auto Diff  2017  White Blood Count  15.8 10^3/uL  High  3.5-10.8  









 Red Blood Count  4.83 10^6/uL    4.0-5.4  

 

 Hemoglobin  14.3 g/dL    12.0-16.0  

 

 Hematocrit  43 %    35-47  

 

 Mean Corpuscular Volume  89 fL    80-97  

 

 Mean Corpuscular Hemoglobin  30 pg    27-31  

 

 Mean Corpuscular HGB Conc  33 g/dL    31-36  

 

 Red Cell Distribution Width  14 %    10.5-15  

 

 Platelet Count  295 10^3/uL    150-450  

 

 Mean Platelet Volume  10 um3    7.4-10.4  

 

 Abs Neutrophils  12.2 10^3/uL  High  1.5-7.7  

 

 Abs Lymphocytes  2.2 10^3/uL    1.0-4.8  

 

 Abs Monocytes  1.1 10^3/uL  High  0-0.8  

 

 Abs Eosinophils  0.3 10^3/uL    0-0.6  

 

 Abs Basophils  0.1 10^3/uL    0-0.2  

 

 Abs Nucleated RBC  0 10^3/uL      

 

 Granulocyte %  77.1 %    38-83  

 

 Lymphocyte %  13.8 %  Low  25-47  

 

 Monocyte %  6.9 %    1-9  

 

 Eosinophil %  1.7 %    0-6  

 

 Basophil %  0.5 %    0-2  

 

 Nucleated Red Blood Cells %  0      









 Laboratory test finding  2017  Lactic Acid  1.3 mmol/L    0.5-2.0  3

 

 Comp Metabolic Panel  2017  Sodium  134 mmol/L    133-145  









 Chloride  102 mmol/L    101-111  

 

 Co2 Carbon Dioxide  25 mmol/L    22-32  

 

 Glucose  201 mg/dL  High    4

 

 Blood Urea Nitrogen  9 mg/dL    6-24  5

 

 Creatinine  0.71 mg/dL    0.51-0.95  6

 

 BUN/Creatinine Ratio  12.7    8-20  

 

 Calcium  9.3 mg/dL    8.6-10.3  7

 

 Total Protein  7.5 g/dL    6.4-8.9  8

 

 Albumin  4.2 g/dL    3.2-5.2  9

 

 Globulin  3.3 g/dL    2-4  

 

 Albumin/Globulin Ratio  1.3    1-3  

 

 Total Bilirubin  1.10 mg/dL  High  0.2-1.0  10

 

 Alkaline Phosphatase  70 U/L      11

 

 Alt  16 U/L    7-52  12

 

 Egfr Non-  82.9    >60  

 

 Egfr   106.6    >60  13

 

 Potassium  TNP mmol/L    3.5-5.0  14

 

 Anion Gap  7 mmol/L    2-11  

 

 Ast  TNP U/L    13-39  15









 Laboratory test finding  2017  Lipase  19 U/L    11.0-82.0  16









 C Reactive Protein  9.47 mg/L  High  < 5.00  17









 Urinalysis Profile  2017  Urine Color  Yellow      









 Urine Appearance  Clear      

 

 Urine Specific Gravity  1.019    1.010-1.030  

 

 Urine pH  5.0    5-9  

 

 Urine Urobilinogen  Negative    Negative  

 

 Urine Ketones  Negative    Negative  

 

 Urine Protein  Negative    Negative  

 

 Urine Leukocytes  2+    Negative  

 

 Urine Blood  Negative    Negative  

 

 Urine Nitrite  Negative    Negative  

 

 Urine Bilirubin  Negative    Negative  

 

 Urine Glucose  Negative    Negative  

 

 Urine White Blood Cell  1+(6-10/hpf)    Absent  

 

 Urine Red Blood Cell  Absent    Absent  

 

 Urine Bacteria  Absent    Absent  

 

 Urine Squamous Epithelial Cell  Present    Absent  









 Laboratory test finding  2017  Hemoglobin A1c (Glyco  6.9 %  High  Less 
than 6.0  18



     HGB)        









 Urine Culture And Sensitivities  SEE RESULT BELOW      19









 Laboratory test finding  2017  Stool Culture  SEE RESULT BELOW      20









 O&P Ova & Parasites Full  SEE RESULT BELOW      21

 

 Parasitic Examination  See Comment      22









 Basic Metabolic Profile  2016  Sodium  138 mEq/L    134-149  









 Potassium  4.3 mEq/L    3.6-5.5  

 

 Chloride  100 mEq/L      

 

 Carbon Dioxide  24 mEq/L    21-32  

 

 Glucose  201 mg/dL  High    23

 

 BUN  12 mg/dL    6-26  

 

 Creatinine  0.6 mg/dL    0.6-1.4  

 

 BUN/Creat Ratio  20.0 CALC    8.0-36.0  

 

 Calcium  9.4 mg/dL    8.6-10.2  

 

 GFR Non-African American  >60 ml/min/1.73m^    >=60  

 

 GFR African American  >60 ml/min/1.73m^    >=60  









 Laboratory test finding  2016  Hemoglobin A1c ([facility])  6.5 %  High  4.1-
5.7  

 

 Laboratory test finding  2016  Hemoglobin A1c ([facility])  6.6 %  High  4.1-
5.7  

 

 Laboratory test finding  12/10/2015  Hemoglobin A1c  6.9 %  High  4.1-5.7  



     ([facility])        

 

 Complete Blood Count  12/10/2015  WBC  9.4 x10^3/UL    3.6-9.6  









 RBC  4.60 x10^6/UL    3.90-5.70  

 

 HGB  13.3 g/dL    12.1-17.2  

 

 HCT  41 %    36-50  

 

 MCV  89.0 fL    82.2-97.4  

 

 MCH  29.0 pg    27.6-33.3  

 

 MCHC  32.6 g/dL  Low  33.0-35.5  

 

 RDW  12.9 %    11.6-13.7  

 

 PLT  270 x10^3/UL    150-400  

 

 MPV  7.2 fL  Low  7.4-10.4  

 

 Gran #  6.8 x10^3/UL    1.5-7.2  

 

 Lymph#  2.3 x10^3/UL    0.7-4.9  

 

 Mono#  0.3 x10^3/UL    0.1-0.9  

 

 Gran %  71.2 %    42.2-75.2  

 

 Lymph %  25.2 %    20.5-51.1  

 

 Mono%  3.6 %    1.7-9.3  









 Lipid Profile  12/10/2015  Cholesterol  209 mg/dL  High  120-200  









 Triglycerides  151 mg/dL      

 

 HDL Cholesterol  66 mg/dL    30-85  

 

 LDL (Calculated)  113 CALC    0-129  

 

 VLDL Cholesterol  30 mg/dL    0-50  

 

 HDL Risk Factor  3.2 CALC    0.0-4.4  









 Laboratory test finding  12/10/2015  Vitamin D25  37      









 TSH  1.66 mIU/L    0.50-6.00  

 

 Free T4  1.26 ng/dL    0.75-1.54  









 Comprehensive Metabolic Prof  12/10/2015  Sodium  140 mEq/L    134-149  









 Potassium  4.6 mEq/L    3.6-5.5  

 

 Chloride  97 mEq/L      

 

 Carbon Dioxide  27 mEq/L    21-32  

 

 Glucose  177 mg/dL  High    24

 

 BUN  14 mg/dL    6-26  

 

 Creatinine  0.7 mg/dL    0.6-1.4  

 

 BUN/Creat Ratio  20.0 CALC    8.0-36.0  

 

 Calcium  9.7 mg/dL    8.6-10.2  

 

 Total Protein  7.6 g/dL    6.4-8.3  

 

 Albumin  4.7 g/dL    3.8-5.5  

 

 Globulin  2.9 g/dL    2.0-4.8  

 

 A/G Ratio  1.6 CALC    0.6-2.3  

 

 Alk. Phosphatase  64 U/L      

 

 Alt (SGPT)  19 U/L    7-35  

 

 Ast (Sgot)  15 U/L    5-34  

 

 Total Bilirubin  1.0 mg/dL    0.2-1.3  

 

 GFR Non-African American  >60 ml/min/1.73m^    >=60  

 

 GFR African American  >60 ml/min/1.73m^    >=60  









 Laboratory test finding  09/15/2015  Clotest  SEE RESULT BELOW      25

 

 Laboratory test finding  09/15/2015  Surgical Pathology  SEE RESULT BELOW      
26

 

 Ict Hemoccult ([facility])  09/15/2015  Ict Hemoccult (1)  9/4/15 NEG      









 Ict Hemoccult-(2)  9/5/15 NEG      

 

 Ict-Hemoccult (3)  9/6/15 NEG      









 Laboratory test  2015  Stool Occult Blood  SEE RESULT BELOW      27



 finding            

 

 Laboratory test  2015  Hemoglobin A1c  8.1 %  High  4.1-5.7  



 finding    (a/[facility] [place],[facility])        

 

 Laboratory test  2015  Hemoglobin A1c  7.7 %  High  4.1-5.7  



 finding    (a/[facility] [place],CX)        

 

 Laboratory test  2014  Hemoglobin A1c  7.8 %  High  4.1-5.7  



 finding    ([facility])        

 

 Lipid Profile  2014  Cholesterol  265 mg/dL  High  120-200  









 Triglycerides  182 mg/dL      

 

 HDL Cholesterol  51 mg/dL    30-85  

 

 LDL (Calculated)  178 CALC  High  0-129  

 

 VLDL Cholesterol  36 mg/dL    0-50  

 

 HDL Risk Factor  5.2 CALC  High  0.0-4.4  









 Laboratory test  2014  Hemoglobin A1c  9.5 %  High  4.1-5.7  



 finding    (a/[facility],CX)        

 

 Microalb/Creatinine,  2014  Microalb, Random  17.8 mg/L mg/L    0.5-37  



 Random Ur    (a/[facility]/CTX)        









 Urine Creatinine (F/C/CTX)  10.6 mmol/L nmol/L      

 

 Microalb.,Creatinine (F/C/CTX)  1.7mg/mmol      









 Laboratory test finding  2014  TSH  2.51 mIU/L    0.50-6.00  

 

 Comprehensive Metabolic Prof  2014  Sodium  135 mEq/L    134-149  









 Potassium  4.6 mEq/L    3.6-5.5  

 

 Chloride  98 mEq/L      

 

 Carbon Dioxide  29 mEq/L    21-32  

 

 Glucose  258 mg/dL  High    28

 

 BUN  11 mg/dL    6-26  

 

 Creatinine  0.7 mg/dL    0.6-1.4  

 

 BUN/Creat Ratio  15.7 CALC    8.0-36.0  

 

 Calcium  9.4 mg/dL    8.6-10.2  

 

 Total Protein  7.6 g/dL    6.3-8.1  

 

 Albumin  4.1 g/dL    3.8-5.5  

 

 Globulin  3.5 g/dL    2.0-4.8  

 

 A/G Ratio  1.2 CALC    0.6-2.3  

 

 Alk. Phosphatase  82 U/L      

 

 Alt (SGPT)  17 U/L    7-35  

 

 Ast (Sgot)  13 U/L    5-34  

 

 Total Bilirubin  0.9 mg/dL    0.2-1.3  









 Surgical Pathology  2014  S  RUN DATE:       <SEE      



       NOTE>      

 

 Surgical Pathology  2013  S  RUN DATE:      30



       05/15/ <SEE      



       NOTE>      

 

 Laboratory test finding  04/15/2013  Hemoglobin A1c  8.9 %  High  4.1-5.7  



     ([facility],CX)        

 

 Laboratory test finding  2013  Hemoglobin A1c  10.1 %  High  4.1-5.7  



     ([facility],CX)        

 

 Laboratory test finding  2012  Surgical Pathology  ---------------      
31



       - <SEE NOTE>      

 

 Laboratory test finding  2012  Inr ([facility]ma)  2.5    2.0-3.0  

 

 Laboratory test finding  2012  Inr ([facility]ma)  3.1  High  2.0-3.0  

 

 Laboratory test finding  2012  Inr ([facility]ma)  2.6    2-3  

 

 Laboratory test finding  2012  Inr ([facility])  3.8  High  2.0-3.0  

 

 Laboratory test finding  2012  Inr ([facility]ma)  2.6  High  0.9-1.1  

 

 Laboratory test finding  2012  Inr ([facility]ma)  1.4  Low  2-3  

 

 Laboratory test finding  2012  Inr ([facility]ma)  2.0    2-3  

 

 Laboratory test finding  2012  Inr ([facility]ma)  2.9    2.0-3.0  

 

 Laboratory test finding  2012  Inr ([facility]ma)  2.5    2.0-3.0  

 

 Laboratory test finding  2012  Inr ([facility]ma)  1.6  Low  2.0-3.0  

 

 Laboratory test finding  2012  Inr (Fma)  1.3  Low  2.0-3.0  

 

 Laboratory test finding  2012  Inr (Fma)  1.1  Low  2.0-3.0  

 

 Comprehensive Metabolic  2012  Albumin  4.5 g/dL    3.8-5.5  



 Prof            









 Alk. Phos.  93 U/L      

 

 Alt (SGPT)  16 U/L    7-35  

 

 Ast (Sgot)  11 U/L    5-34  

 

 BUN  10 mg/dL    6-26  

 

 Calcium  9.9 mg/dL    8.6-10.2  

 

 Chloride  94 mEq/L      

 

 Creatinine  0.8 mg/dL    0.6-1.4  

 

 Carbon Dioxide  26 mEq/L    21-32  

 

 Glucose  218 mg/dL  High    32

 

 Sodium  134 mEq/L    134-149  

 

 Total Bilirubin  0.7 mg/dL    0.2-1.3  

 

 Total Protein  6.9 g/dL    6.3-8.1  

 

 Potassium  4.2 mEq/L    3.6-5.5  

 

 Globulin  2.5 g/dL    2.0-4.8  

 

 A/G Ratio  1.8 Calc    0.6-2.2  

 

 BUN/Creat Ratio  12.8 Calc    8.0-36.0  









 CBC Electronic (a)  2012  WBC  7.5    3.6-9.6  









 RBC  4.06    3.90-5.70  

 

 Hemoglobin ([facility]/CTX)  11.5 g/dL  Low  12.1 - 17.2  

 

 Hematocrit ([facility]/CTX)  35.3 %  Low  36.1 - 50.3  

 

 Platelets  368 10^3/ul    150-400  

 

 Lymph%  32.6    20.5-51.1  

 

 Mixed%  3.4      

 

 Neutrophils %  64.0      

 

 Mean Corpuscular Vol  87    82.2-97.4  

 

 Mean Corpuscular Hemoglobin  28.4    27.6-33.3  

 

 Mean Corpuscular Hemo Concen  32.7    32.0-36.0  

 

 RDW  12.1    11.6-13.7  

 

 Mean Platelet Volume  7.5    6.5-11.0  









 Laboratory test finding  2012  Magnesium  1.9 mg/dL    1.7-2.6  

 

 CBC Auto Diff  2012  White Blood Count  9.3 CUMM    4.8-10.8  









 Red Cell Count  3.92 CUMM  Low  4.2-5.4  

 

 Hemoglobin  11.6 g/dL  Low  12.0-16.0  

 

 Hematocrit  34 %  Low  35-47  

 

 Mean Corpuscular Volume  86 um3    79-97  

 

 Mean Corpuscular Hemoglob  30 pg    27-31  

 

 Mean Corpuscular HGB Cone  34 g/dL    32-36  

 

 Redcell Distribution WDTH  13 %    10.5-15  

 

 Platelet Count  305 CUMM    150-450  

 

 Mean Platelet Volume  8.3 um3    7.4-10.4  

 

 Gran %  52.9 %    38-83  

 

 Lymph %  36.7 %    25-47  

 

 Mononuclear %  7.6 %    1-9  

 

 Eosinophil %  1.9 %    0-6  

 

 Basophil %  0.9 %    0-2  

 

 Abs Lymphs  3.4    1.0-4.8  

 

 Abs Mononuclear  0.7    0-0.8  

 

 Absolute Neutrophil Count  4.9    1.5-7.7  

 

 Abs Eosinophils  0.2    0-0.6  

 

 Abs Basophils  0.1    0-0.2  









 Comp Metabolic Panel  2012  Sodium  134 mmol/L  Low  135-145  









 Potassium  4.0 mmol/L    3.5-5.0  

 

 Chloride  100 mmol/L  Low  101-111  

 

 Co2 (Carbon Dioxide)  22.0 mmol/L    22-32  

 

 Anion Gap  12.0 mmol/L  High  2-11  33

 

 Glucose  222 mg/dL  High    

 

 BUN  12 mg/dL    6-24  

 

 Creatinine  0.8 mg/dL    0.50-1.40  

 

 One Over Creatinine  1.25      

 

 BUN/Creatinine Ratio  15.0    8-20  

 

 Calcium  9.8 mg/dL    8.1-9.9  

 

 Total Protein  6.5 GM/DL    6.2-8.1  

 

 Albumin  3.9 GM/DL    3.6-5.4  

 

 Globulin  2.6 GM/DL    2-4  

 

 Albumin/Globulin Ratio  1.5    1-3  

 

 Bilirubin Total  0.7 mg/dL    0.4-1.5  34

 

 Alkaline Phosphatase  85 U/L      

 

 Alt (SGPT)  18 U/L    14-54  

 

 Ast (Sgot)  19 U/L    12-42  

 

 eGFR Non-  73.4    > 60  

 

 eGFR   94.4    > 60  35









 CBC Auto Diff  2012  White Blood Count  8.9 CUMM    4.8-10.8  36









 Red Cell Count  4.61 CUMM    4.2-5.4  36

 

 Hemoglobin  13.9 g/dL    12.0-16.0  36

 

 Hematocrit  40 %    35-47  36

 

 Mean Corpuscular Volume  87 um3    79-97  36

 

 Mean Corpuscular Hemoglob  30 pg    27-31  36

 

 Mean Corpuscular HGB Cone  35 g/dL    32-36  36

 

 Redcell Distribution WDTH  13 %    10.5-15  36

 

 Platelet Count  246 CUMM    150-450  36

 

 Mean Platelet Volume  8.8 um3    7.4-10.4  36

 

 Gran %  71.0 %    38-83  36

 

 Lymph %  20.8 %  Low  25-47  36

 

 Mononuclear %  5.7 %    1-9  36

 

 Eosinophil %  2.2 %    0-6  36

 

 Basophil %  0.3 %    0-2  36

 

 Abs Lymphs  1.8    1.0-4.8  36

 

 Abs Mononuclear  0.5    0-0.8  36

 

 Absolute Neutrophil Count  6.3    1.5-7.7  36

 

 Abs Eosinophils  0.2    0-0.6  36

 

 Abs Basophils  0    0-0.2  36









 Type And Screen  2012  Patient Blood Type  A POSITIVE      36









 Antibody Screen  NEGATIVE      36

 

 Specimen Discard Date  12      36, 37









 Basic Metabolic Panel  2012  Sodium  137 mmol/L    135-145  36









 Potassium  4.2 mmol/L    3.5-5.0  36

 

 Chloride  103 mmol/L    101-111  36

 

 Co2 (Carbon Dioxide)  25.0 mmol/L    22-32  36

 

 Anion Gap  9.0 mmol/L    2-11  36, 38

 

 Glucose  235 mg/dL  High    36

 

 BUN  8 mg/dL    6-24  36

 

 Creatinine  0.7 mg/dL    0.50-1.40  36

 

 One Over Creatinine  1.42      36

 

 BUN/Creatinine Ratio  11.4    8-20  36

 

 Calcium  8.8 mg/dL    8.1-9.9  36

 

 eGFR Non-  85.6    > 60  36

 

 eGFR   110.1    > 60  36, 39









 Urinalysis W/Microscopic  2012  Ua Color  YELLOW    Yellow  36









 Appearance-Urine  CLEAR    Clear  36

 

 Specific Gravity-Ur  1.021    1.010-1.030  36

 

 Esterase-Urine  1+    Negative  36

 

 Nitrite  NEGATIVE    Negative  36

 

 Urobilinogen-Ur-DIP  NEGATIVE    Negative  36

 

 Protein-Urine  NEGATIVE    Negative  36

 

 PH-Urine  5.0    5-9  36

 

 Blood-Urine  NEGATIVE    Negative  36

 

 Ketones-Urine  NEGATIVE    Negative  36

 

 Bilirubin-Ur  NEGATIVE    Negative  36

 

 Glucose-Urine  NEGATIVE    Negative  36

 

 WBC-Urine  3-5    0-5  36

 

 RBC-Urine  0-2    0-2  36

 

 Epith Cells-Ur  OCCASIONAL    None  36

 

 Bacteria-Urine  TRACE    None  36









 Urine Culture &  2012  M  ---------------- <SEE NOTE>      36, 40



 Sensitivi            

 

 Basic Metabolic Panel  2012  Sodium  135 mmol/L    135-145  









 Potassium  4.2 mmol/L    3.5-5.0  

 

 Chloride  99 mmol/L  Low  101-111  

 

 Co2 (Carbon Dioxide)  29.0 mmol/L    22-32  

 

 Anion Gap  7.0 mmol/L    2-11  41

 

 Glucose  189 mg/dL  High    

 

 BUN  14 mg/dL    6-24  

 

 Creatinine  0.7 mg/dL    0.50-1.40  

 

 One Over Creatinine  1.42      

 

 BUN/Creatinine Ratio  20.0    8-20  

 

 Calcium  9.1 mg/dL    8.1-9.9  

 

 eGFR Non-  85.6    > 60  

 

 eGFR   110.1    > 60  42









 CBC No Diff  2012  White Blood Count  10.4 CUMM    4.8-10.8  









 Red Cell Count  4.40 CUMM    4.2-5.4  

 

 Hemoglobin  13.5 g/dL    12.0-16.0  

 

 Hematocrit  38 %    35-47  

 

 Mean Corpuscular Volume  87 um3    79-97  

 

 Mean Corpuscular Hemoglob  31 pg    27-31  

 

 Mean Corpuscular HGB Cone  35 g/dL    32-36  

 

 Redcell Distribution WDTH  13 %    10.5-15  

 

 Platelet Count  282 CUMM    150-450  

 

 Mean Platelet Volume  9.5 um3    7.4-10.4  









 Protime  2012  Inr  0.89    0.88-1.13  43









 Protime  10.5 SEC    10.3-13.5  44









 Laboratory test finding  2012  Hemoglobin A1c  8.8 %  High  4.1-5.7  



     ([facility],CX)        

 

 Comprehensive Metabolic  2012  Albumin  4.1 g/dL    3.8-5.5  



 Prof            









 Alk. Phos.  92 U/L      

 

 Alt (SGPT)  35 U/L    7-35  

 

 Ast (Sgot)  14 U/L    5-34  

 

 BUN  10 mg/dL    6-26  

 

 Calcium  9.2 mg/dL    8.6-10.2  

 

 Chloride  97 mEq/L      

 

 Creatinine  0.7 mg/dL    0.6-1.4  

 

 Carbon Dioxide  29 mEq/L    21-32  

 

 Glucose  218 mg/dL  High    45

 

 Sodium  138 mEq/L    134-149  

 

 Total Bilirubin  0.6 mg/dL    0.2-1.3  

 

 Total Protein  6.4 g/dL    6.3-8.1  

 

 Potassium  4.4 mEq/L    3.6-5.5  

 

 Globulin  2.3 g/dL    2.0-4.8  

 

 A/G Ratio  1.8 Calc    0.6-2.2  

 

 BUN/Creat Ratio  15.8 Calc    8.0-36.0  









 Lipid Profile  2012  Cholesterol  180 mg/dL    120-200  









 HDL  44 mg/dL    30-85  

 

 Triglycerides  126 mg/dL      

 

 HDL Risk Factor  4.1 CALC  High  0.0-4.0  

 

 LDL (Calculated)  111 CALC    0-129  

 

 VLDL (Calculated)  25 mg/dL    0-50  









 CBC Electronic (a)  2012  WBC  8.5    3.6-9.6  









 RBC  4.33    3.90-5.70  

 

 Hemoglobin ([facility]/CTX)  12.4 g/dL    12.1 - 17.2  

 

 Hematocrit ([facility]/CTX)  37.8 %    36.1 - 50.3  

 

 Platelets  319 10^3/ul    150-400  

 

 Lymph%  31.2    20.5-51.1  

 

 Mixed%  4.1      

 

 Neutrophils %  64.7      

 

 Mean Corpuscular Vol  87    82.2-97.4  

 

 Mean Corpuscular Hemoglobin  28.7    27.6-33.3  

 

 Mean Corpuscular Hemo Concen  32.9    32.0-36.0  

 

 RDW  11.5  Low  11.6-13.7  

 

 Mean Platelet Volume  8.3    6.5-11.0  









 Comprehensive Metabolic Prof  2011  Albumin  4.3 g/dL    3.8-5.5  









 Alk. Phos.  96 U/L      

 

 Alt (SGPT)  22 U/L    7-35  

 

 Ast (Sgot)  12 U/L    5-34  

 

 BUN  10 mg/dL    6-26  

 

 Calcium  9.3 mg/dL    8.6-10.2  

 

 Chloride  99 mEq/L      

 

 Creatinine  0.7 mg/dL    0.6-1.4  

 

 Carbon Dioxide  26 mEq/L    21-32  

 

 Glucose  222 mg/dL  High    46

 

 Sodium  140 mEq/L    134-149  

 

 Total Bilirubin  0.7 mg/dL    0.2-1.3  

 

 Total Protein  6.9 g/dL    6.3-8.1  

 

 Potassium  4.2 mEq/L    3.6-5.5  

 

 Globulin  2.6 g/dL    2.0-4.8  

 

 A/G Ratio  1.7 Calc    0.6-2.2  

 

 BUN/Creat Ratio  13.4 Calc    8.0-36.0  









 Lipid Profile  2011  Cholesterol  265 mg/dL  High  120-200  47









 HDL  52 mg/dL    30-85  

 

 Triglycerides  187 mg/dL      

 

 HDL Risk Factor  5.1 CALC  High  0.0-4.0  

 

 LDL (Calculated)  176 CALC  High  0-129  

 

 VLDL (Calculated)  37 mg/dL    0-50  









 CBC Electronic (a)  2011  WBC  8.0    3.6-9.6  









 RBC  5.02    3.90-5.70  

 

 Hemoglobin (Fma/CMC/CTX)  14.9 g/dL    12.1 - 17.2  

 

 Hematocrit (a/CMC/CTX)  44.0 %    36.1 - 50.3  

 

 Platelets  314 10^3/ul    150-400  

 

 Lymph%  27.3    20.5-51.1  

 

 Mixed%  6.6      

 

 Neutrophils %  66.1      

 

 Mean Corpuscular Vol  88    82.2-97.4  

 

 Mean Corpuscular Hemoglobin  29.6    27.6-33.3  

 

 Mean Corpuscular Hemo Concen  33.9    32.0-36.0  

 

 RDW  12.0    11.6-13.7  

 

 Mean Platelet Volume  7.8    6.5-11.0  









 1  SEE RESULT BELOW



   -----------------------------------------------------------------------------
---------------



   Name:  HUE DU DESIRE                     : 1955    Attend Dr: Araceli Monterroso MD



   Acct:  [id number]  Unit: [id number]  AGE: 63            Location:  [facility]



   Re18                        SEX: F             Status:    DEP ER



   -----------------------------------------------------------------------------
---------------



   



   SPEC: [id number]         TIFFANY:   [address] [place] [address] DR: Dia CRENSHAW



   REQ:  [id number]              RECD:   18-



   STATUS: VIVI SOLITARIO DR: Araceli Nguyen MD



   _



   SOURCE: URINE          SPDESC:



   ORDERED:  Urine Culture



   



   -----------------------------------------------------------------------------
---------------



   Procedure                         Result                         Reported   
        Site



   -----------------------------------------------------------------------------
---------------



   Urine Culture  Final                                             18-
1209      ML



   



   No growth of clinically significant organisms



   



   -----------------------------------------------------------------------------
---------------



   * ML - MAIN LAB ([facility])



   .



   



   



   



   



   



   



   



   



   



   



   



   



   



   



   



   



   



   



   



   



   



   



   



   



   



   ** END OF REPORT **



   



   * ML=Testing performed at Main Lab



   DEPARTMENT OF PATHOLOGY,  [address] [place] [address]



   Phone # [phone]      Fax #[phone]



   Duane Hopkins M.D. Director     [facility] # [id number]

 

 2  SEE RESULT BELOW



   -----------------------------------------------------------------------------
---------------



   Name:  HUE DU                     : 1955    Attend Dr: Veto Esquivel MD



   Acct:  [id number]  Unit: [id number]  AGE: 62            Location:  ENDO



   Re17                        SEX: F             Status:    DEP REF



   -----------------------------------------------------------------------------
---------------



   



   SPEC: [id number]             TIFFANY: 17-      SUBM DR: Veto Esquivel MD



   REQ:  [id number]             RECD: 



   STATUS: ALYSON SOLITARIO DR: Alyson Nguyen MD



   _



   ORDERED:  LEVEL 4/3



   



   FINAL DIAGNOSIS



   



   



   1.   Colon, distal sigmoid, biopsy:



   -- Hyperplastic polyp.



   



   2.   Colon, ileocecal valve, biopsy:



   -- Tubular adenoma.



   -- No high grade dysplasia or malignancy.



   



   3.   Colon, descending, biopsy:



   -- Tubular adenoma.



   -- No high grade dysplasia or malignancy.



   



   



   



   CLINICAL HISTORY



   



   Follow up after polyp, diarrhea three to four times no blood noted.



   



   POST-OPERATIVE DIAGNOSIS



   



   Colonoscopy to cecum with ease. Conclusions/Plan: Three polyps follow up 
after pathology,



   five year follow up.



   



   GROSS DESCRIPTION



   



   1.   The specimen is received in formalin labeled, Distal Sigmoid Polyp, and 
consists of a



   0.6 x 0.4 by up to 0.3 cm thick of tan-pink irregular to polypoid soft 
tissue fragment,



   which is entirely submitted in one cassette.



   



   2.   The specimen is received in formalin labeled, Biopsy Ileocecal Polyp, 
and consists of a



   0.6 x 0.4 by up to 0.3 cm tan-pink irregular to polypoid soft tissue fragment
, which is



   entirely submitted in one cassette.



   



   3.   The specimen is received in formalin labeled, Biopsy Descending Colon 
Polyp, and



   consists of a 0.5 x 0.4 x 0.3 centimeters tan-pink irregular to polypoid 
soft tissue



   



   ** CONTINUED ON NEXT PAGE **



   



   * ML=Testing performed at Main Lab



   DEPARTMENT OF PATHOLOGY,  [address], [place] [address]



   Phone # [phone]      Fax #[phone]



   Duane Hopkins M.D. Director     [facility] # [id number]



   



   



   



   RUN DATE: 17               [facility] LAB **LIVE**         
       PAGE    2



   



   -----------------------------------------------------------------------------
---------------



   Patient: HUE DU DESIRE                      [id number]     (Continued)



   -----------------------------------------------------------------------------
---------------



   



   GROSS DESCRIPTION          (Continued)



   



   GROSS DESCRIPTION          (Continued)



   



   fragment, which is entirely submitted in one cassette.



   



   Signed __________(signature on file)___________ Dia Angela MD  1246



   



   -----------------------------------------------------------------------------
---------------



   



   



   



   



   



   



   



   



   



   



   



   



   



   



   



   



   



   



   



   



   



   



   



   



   



   



   



   



   



   



   



   



   



   



   



   



   ** END OF REPORT **



   



   * ML=Testing performed at Main Lab



   DEPARTMENT OF PATHOLOGY,  [address] [place] [address]



   Phone # [phone]      Fax #[phone]



   Duane Hopkins M.D. Director     [facility] # [id number]

 

 3  [facility] Severe Sepsis and Septic Shock Management Bundle Measure



   requires all lactic acids initially measuring >2.0 mmol/L be



   repeated.

 

 4  Specimen hemolyzed. Result may not be valid.

 

 5  Specimen hemolyzed. Result may not be valid.

 

 6  Specimen hemolyzed. Result may not be valid.

 

 7  Specimen hemolyzed. Result may not be valid.

 

 8  Specimen hemolyzed. Result may not be valid.

 

 9  Specimen hemolyzed. Result may not be valid.

 

 10  Specimen hemolyzed. Result may not be valid.

 

 11  Specimen hemolyzed. Result may not be valid.

 

 12  Specimen hemolyzed. Result may not be valid.

 

 13  *******Because ethnic data is not always readily available,



   this report includes an eGFR for both -Americans and



   non- Americans.****



   The National Kidney Disease Education Program (NKDEP) does



   not endorse the use of the MDRD equation for patients that



   are not between the ages of 18 and 70, are pregnant, have



   extremes of body size, muscle mass, or nutritional status,



   or are non- or non-.



   According to the National Kidney Foundation, irrespective of



   diagnosis, the stage of the disease is based on the level of



   kidney function:



   Stage Description                      GFR(mL/min/1.73 m(2))



   1     Kidney damage with normal or decreased GFR       90



   2     Kidney damage with mild decrease in GFR          60-89



   3     Moderate decrease in GFR                         30-59



   4     Severe decrease in GFR                           15-29



   5     Kidney failure                       <15 (or dialysis)

 

 14  Unable to report test result due to hemolysis.

 

 15  Unable to report test result due to hemolysis.

 

 16  Specimen hemolyzed. Result may not be valid.

 

 17  Specimen hemolyzed. Result may not be valid.



   Acute inflammation:  >10.00

 

 18  Therapeutic target for the treatment of diabetes



   [place] patients is <7% HBA1C, and in selective



   patients <6.0%.Please refer to American Diabetes



   Association Diabetic care guidelines for further



   information.

 

 19  SEE RESULT BELOW



   -----------------------------------------------------------------------------
---------------



   Name:  HUE DU                     : 1955    Attend Dr: Arturo Laguna MD



   Acct:  [id number]  Unit: [id number]  AGE: 62            Location:  [facility]    
    411-02



   Re17                        SEX: F             Status:    ADM Fercho



   -----------------------------------------------------------------------------
---------------



   



   SPEC: [id number]         TIFFANY:   17-    [facility] DR: Blayne Huynh MD



   REQ:  [id number]              RECD:   



   STATUS: VIVI SOLITARIO DR: Alyson Nguyen MD



   _



   SOURCE: URINE          SPDESC:



   ORDERED:  Urine Culture



   



   -----------------------------------------------------------------------------
---------------



   Procedure                         Result                         Reported   
        Site



   -----------------------------------------------------------------------------
---------------



   Urine Culture  Final                                             17-
1230      ML



   



   No growth of clinically significant organisms



   



   -----------------------------------------------------------------------------
---------------



   * ML - [facility] ([facility])



   .



   



   



   



   



   



   



   



   



   



   



   



   



   



   



   



   



   



   



   



   



   



   



   



   



   



   



   ** END OF REPORT **



   



   * ML=Testing performed at Main Lab



   DEPARTMENT OF PATHOLOGY,  [address] [place] [address]



   Phone # [phone]      Fax #[phone]



   Duane Hopkins M.D. Director     [facility] # [id number]

 

 20  SEE RESULT BELOW



   -----------------------------------------------------------------------------
---------------



   Name:  HUE DU                     : 1955    Attend Dr: Alyson Nguyen MD



   Acct:  [id number]  Unit: [id number]  AGE: 61            Location:  [facility]



   Re17                        SEX: F             Status:    REG REF



   -----------------------------------------------------------------------------
---------------



   



   SPEC: [id number]         TIFFANY:       [facility] DR: Alyson Nguyen MD



   REQ:  [id number]              RECD:   



   STATUS: COMP



   _



   SOURCE: STOOL          SPDESC:



   ORDERED:  Stool Culture, Fecal Lactoferr, O P (Full)



   COMMENTS: DO O P FULL REGARDLESS



   



   Unable to perform Shiga Toxin testing.  Specimen collection



   requirements were not met.  Stool for Shiga Toxin testing



   must be received by the laboratory within 2 hours of



   collection or placed in Clifford-Jamari transport medium.



   



   *please interpret stool culture result with caution*



   Stool specimen was not placed into appropriate transport



   medium within recommended time-frame.  Testing may be less



   Sensitive.



   



   -----------------------------------------------------------------------------
---------------



   Procedure                         Result                         Reported   
        Site



   -----------------------------------------------------------------------------
---------------



   Stool Culture  Final                                             17-
1347      ML



   



   Result                      No enteric pathogens isolated



   



   Testing for Salmonella, Shigella, Aeromonas, Plesiomonas,



   Yersinia and Campylobacter are included in a Stool Culture.



   



   Vibrio spp not routinely tested for in a stool culture.



   If testing is desired, please request specifically when



   placing test order.



   



   Sensitivities not routinely performed on stool isolates, as



   antibiotics may prolong the carriage rate of bacteria.



   Please contact the microbiology lab if sensitivities are



   required.



   



   Stool Specimen Description  Final                                17-
1745      ML



   Stool Color                 Brown



   Stool Form                  Semi-formed



   



   ** CONTINUED ON NEXT PAGE **



   



   * ML=Testing performed at Main Lab



   DEPARTMENT OF PATHOLOGY,  [address] [place] [address]



   Phone # [phone]      Fax #[phone]



   Duane Hopkins M.D. Director     [facility] # [id number]



   



   



   



   -----------------------------------------------------------------------------
---------------



   Patient: HUE DU                      [id number]     (Continued)



   -----------------------------------------------------------------------------
---------------



   



   



   Specimen: [id number]    Collected: 17-  Received: 17-
    (Continued)



   



   -----------------------------------------------------------------------------
---------------



   Procedure                         Result                         Reported   
        Site



   -----------------------------------------------------------------------------
---------------



   Stool Specimen Description  Final   (continued)                  17-
1745



   Stool Consistency           Soft



   



   Shiga Toxin 1   2  Final                                         02/15/17-
1417      ML



   Test not performed



   



   Fecal Lactoferrin (Stool WBC)  Final                             17-
1828      ML



   Fecal Lactoferrin           Negative by Immunoassay



   



   TEST LIMITATIONS:



   



   Assay detects elevated levels of lactoferrin released from



   fecal leukocytes as a marker of intestinal inflammation. The



   test may not be appropriate in immunocompromised persons.



   Fecal samples from breast fed infants should not be used



   with this assay.



   



   O P: Giardia/Cryptospor Screen  Final                            02/15/17-
1022      ML



   



   Organism 1                     Neg Cryptosporidium/Giardia



   



   Giardia and cryptosporidium antigen testing performed by



   enzyme immunoassay.  The use of colonic washes, aspirates or



   other diluted sample types has not been established and



   could affect the performance of the assay. Stool samples



   contaminated with an oily or particulate base (eg. Barium,



   mineral oil etc.) could interfere with the test and are not



   recommended.



   



   Ova   Parasite Concen Full  Final                                02/15/17-
0919      ML



   Test not performed



   



   -----------------------------------------------------------------------------
---------------



   * ML - MAIN LAB ([facility])



   .



   



   



   



   



   ** END OF REPORT **



   



   * ML=Testing performed at Main Lab



   DEPARTMENT OF PATHOLOGY,  [address] [place] [address]



   Phone # [phone]      Fax #[phone]



   Duane Hopkins M.D. Director     [facility] # [id number]

 

 21  SEE RESULT BELOW



   -----------------------------------------------------------------------------
---------------



   Name:  HUE DU                     : 1955    Attend Dr: Alyson Nguyen MD



   Acct:  [id number]  Unit: [id number]  AGE: 61            Location:  [facility]



   Re17                        SEX: F             Status:    REG REF



   -----------------------------------------------------------------------------
---------------



   



   SPEC: [id number]         TIFFANY:       [facility] DR: Alyson Nguyen MD



   REQ:  [id number]              RECD:   [id number]



   STATUS: RES



   _



   SOURCE: STOOL          SPDESC:



   ORDERED:  Stool Culture, O P (Full)



   



   -----------------------------------------------------------------------------
---------------



   Procedure                         Result                         Reported   
        Site



   -----------------------------------------------------------------------------
---------------



   Stool Culture



   PENDING



   



   Shiga Toxin 1   2



   PENDING



   



   O P: Giardia/Cryptospor Screen  Final                            17-
1208      ML



   



   Organism 1                     Neg Cryptosporidium/Giardia



   



   Giardia and cryptosporidium antigen testing performed by



   enzyme immunoassay.  The use of colonic washes, aspirates or



   other diluted sample types has not been established and



   could affect the performance of the assay. Stool samples



   contaminated with an oily or particulate base (eg. Barium,



   mineral oil etc.) could interfere with the test and are not



   recommended.



   



   Ova   Parasite Concen Full  Final                                17-
1059      ML



   Test not performed



   



   -----------------------------------------------------------------------------
---------------



   * ML - MAIN LAB ([facility])



   .



   



   



   



   



   



   



   



   



   ** END OF REPORT **



   



   * ML=Testing performed at Main Lab



   DEPARTMENT OF PATHOLOGY,  [address] [place] [address]



   Phone # [phone]      Fax #[phone]



   Duane Hopkins M.D. Director     [facility] # [id number]

 

 22  SOURCE: STOOL



   PARASITIC EXAMINATION                                  FINAL



   No parasites seen.



   Cryptosporidium, Cyclospora, and microsporidia are not



   readily detected by this method.



   Single negative specimen does not rule out



   parasitic infection.



   Test Performed by:



   [address] [address]



   : Rishabh Conteh II, M.D., Ph.D.

 

 23  consistent w/ previous results

 

 24  consistent w/ previous results

 

 25  SEE RESULT BELOW



   -----------------------------------------------------------------------------
---------------



   Name:  HUE DU                     : 1955    Attend Dr: Veto Esquivel MD



   Acct:  [id number]  Unit: [id number]  AGE: 60            Location:  [facility]



   Re/15/15                        SEX: F             Status:    REG REF



   -----------------------------------------------------------------------------
---------------



   



   SPEC: [id number]         TIFFANY:   09/15/    [facility] DR: Veto Esquivel MD



   REQ:  [id number]              RECD:   09/15/



   STATUS: VIVI SOLITARIO DR: Alyson Nguyen MD



   _



   SOURCE: GAS ANTRUM     SPDESC:



   ORDERED:  Clotest



   



   -----------------------------------------------------------------------------
---------------



   Procedure                         Result                         Verified   
        Site



   -----------------------------------------------------------------------------
---------------



   Clotest  Final                                                   09/16/15-
0752      ML



   Clotest                     Negative



   



   -----------------------------------------------------------------------------
---------------



   * ML - MAIN LAB ([facility])



   .



   



   



   



   



   



   



   



   



   



   



   



   



   



   



   



   



   



   



   



   



   



   



   



   



   



   



   



   ** END OF REPORT **



   



   * ML=Testing performed at Main Lab



   DEPARTMENT OF PATHOLOGY,  [address] [place] [address]



   Phone # [phone]      Fax #[phone]



   Duane Hopkins M.D. Director     [facility] # [id number]

 

 26  SEE RESULT BELOW



   -----------------------------------------------------------------------------
---------------



   Name:  HUE DU                     : 1955    Attend Dr: Veto Esquivel MD



   Acct:  [id number]  Unit: [id number]  AGE: 60            Location:  [facility]



   Re/15/15                        SEX: F             Status:    REG REF



   -----------------------------------------------------------------------------
---------------



   



   SPEC: [id number]             TIFFANY: 09/15/15-      [facility] DR: Veto Esquivel MD



   REQ:  [id number]             RECD: 09/15/



   STATUS: ALYSON SOLITARIO DR: Alyson Nguyen MD



   _



   ORDERED:  LEVEL IV/2



   



   FINAL DIAGNOSIS



   



   



   1.   Stomach, antrum, biopsy:



   -- Body-type gastric mucosa with minimal superficial chronic inflammation.



   



   2.   Gastroesophageal junction, biopsy:



   -- Columnar-type mucosa with chronic inflammation and reactive epithelial 
change.



   -- Negative for intestinal metaplasia and dysplasia.



   



   



   



   



   CLINICAL HISTORY



   



   Black stool



   



   POST-OPERATIVE DIAGNOSIS



   



   Larynx - symmetric; esophagus - normal; EG at 40 irregular, biopsied x2; 
stomach - antral



   erosions - black spot at 2 o'clock, CLOtest and biopsy; duodenum - erythema 
in bulb, 2nd



   through 4th normal. Gastroduodenitis



   



   GROSS DESCRIPTION



   



   1.   The specimen is received in formalin labeled, Gastric Antral Biopsies, 
and consists of



   two tan irregular soft tissue fragments averaging 0.3 x 0.2 x 0.1 cm, which 
are submitted



   entirely in one cassette.



   



   2.   The specimen is received in formalin labeled, EG Junction Biopsy, and 
consists of two



   tan irregular soft tissue fragments measuring 0.3 x 0.3 x 0.2 cm and 0.5 x 
0.3 x 0.1 cm,



   which are submitted entirely in one cassette.



   



   Signed __________(signature on file)___________ Dia Angela MD 09/
17/15 1248



   



   -----------------------------------------------------------------------------
---------------



   



   ** END OF REPORT **



   



   * ML=Testing performed at Main Lab



   DEPARTMENT OF PATHOLOGY,  [address] [place] [address]



   Phone # [phone]      Fax #[phone]



   Duane Hopkins M.D. Director     [facility] # [id number]

 

 27  SEE RESULT BELOW



   -----------------------------------------------------------------------------
---------------



   Name:  HUE DU DESIRE                     : 1955    Attend Dr: Veto Esquivel MD



   Acct:  [id number]  Unit: [id number]  AGE: 60            Location:  [facility]



   Re/15/15                        SEX: F             Status:    REG REF



   -----------------------------------------------------------------------------
---------------



   



   SPEC: [id number]         TIFFANY:   09/14/    [facility] DR: Veto Esquivel MD



   REQ:  [id number]              RECD:   09/15/



   STATUS: VIVI SOLITARIO DR: Alyson Nguyen MD



   _



   SOURCE: STOOL          SPDESC:



   ORDERED:  Hemoccult



   



   -----------------------------------------------------------------------------
---------------



   Procedure                         Result                         Verified   
        Site



   -----------------------------------------------------------------------------
---------------



   Stool Occult Blood  Final                                        09/15/15-
1445      ML



   Stool Occult Blood          Negative



   



   -----------------------------------------------------------------------------
---------------



   * ML - MAIN LAB ([facility])



   .



   



   



   



   



   



   



   



   



   



   



   



   



   



   



   



   



   



   



   



   



   



   



   



   



   



   



   



   ** END OF REPORT **



   



   * ML=Testing performed at Main Lab



   DEPARTMENT OF PATHOLOGY,  [facility] [employer] [place], [place] [address]



   Phone # [phone]      Fax #[phone]



   Duane Hopkins M.D. Director     [facility] # [id number]

 

 28  consistent w/ previous results

 

 29  RUN DATE: 14               [facility] LAB **LIVE**       
         PAGE    1



   RUN TIME: 1312             [facility] [employer] [place], [place]   [address]



   Specimen Inquiry



   



   -----------------------------------------------------------------------------
---------------



   Name:  HUE DU                     : 1955    Attend Dr: Veto Esquivel MD



   Acct:  [id number]  Unit: [id number]  AGE: 59            Location:  [facility]



   Re14                        SEX: F             Status:    REG REF



   -----------------------------------------------------------------------------
---------------



   



   SPEC: [id number]             TIFFANY: 14-          [facility] DR: Veto Esquivel MD



   REQ:  [id number]             RECD: 



   STATUS: ALYSON SOLITARIO DR: Alyson Nguyen MD



   _



   ORDERED:  LEVEL IV



   



   FINAL DIAGNOSIS



   



   Colon, ileocecal valve, biopsy:



   Hyperplastic polyp.



   



   



   



   



   



   



   CLINICAL HISTORY



   



   Screening colonoscopy; personal history of polyps. Usual bowel habits - 
every day, 5-6 days



   per week.



   



   POST-OPERATIVE DIAGNOSIS



   



   Screening colonoscopy to cecum with ease; 1 colon polyp, sessile. Follow up 
- 3 years.



   



   GROSS DESCRIPTION



   



   The specimen is received in formalin labeled Hue Du, Ileal Cecal Polyp 
and consists of



   two tan-pink irregular to polypoid soft tissue fragments measuring 0.4 x 0.2 
x 0.2 cm. and



   0.9 x 0.5 x 0.5 cm. The larger tissue is inked, bisected, and the specimen 
is submitted



   entirely in one cassette.



   



   Signed __________(signature on file)___________ Dia Angela MD  1312



   



   -----------------------------------------------------------------------------
---------------



   



   



   



   



   



   



   



   



   ** END OF REPORT **



   



   * ML=Testing performed at Main Lab



   DEPARTMENT OF PATHOLOGY,  [facility] [employer] [place], [place] [address]



   Phone # [phone]      Fax #[phone]



   Dunae Hopkins M.D. Director     [facility] # [id number]

 

 30  RUN DATE: 05/15/13               [facility] LAB **LIVE**       
         PAGE    1



   RUN TIME: 1509             [facility] [employer] [place], [place]   [address]



   Specimen Inquiry



   



   -----------------------------------------------------------------------------
---------------



   Name:  HUE DU                     : 1955    Attend Dr: Veto Esquivel MD



   Acct:  [id number]  Unit: [id number]  AGE: 58            Location:  [facility]



   Re13                        SEX: F             Status:    REG REF



   -----------------------------------------------------------------------------
---------------



   



   SPEC: [id number]             TIFFANY: 13-          SUBM DR: Veto Esquivel MD



   REQ:  [id number]             RECD: 



   STATUS: ALYSON SOLITARIO DR: Alyson Nguyen MD



   _



   ORDERED:  LEVEL IV/4



   



   FINAL DIAGNOSIS



   



   



   1. Colon, ileocecal polyp, biopsy:



   A.   Tubular adenoma.



   B.   No high grade dysplasia or malignancy.



   



   2. Colon, hepatic flexure, biopsy:



   Inflamed hyperplastic polyp with focal ulceration.



   



   3. Rectosigmoid polyp at 17 cm., biopsy:



   Inflamed hyperplastic polyp with focal ulceration.



   



   4. Colon, rectosigmoid biopsy:



   A.   Tubular adenoma.



   B.   No high grade dysplasia or malignancy.



   



   



   



   



   CLINICAL HISTORY



   



   Usual bowel habit - every day with no blood. Past surgical history - 
gallbladder, .



   Colorectal cancer - paternal aunt, uncle, paternal grandfather. Personal 
history of polyps



   



   POST-OPERATIVE DIAGNOSIS



   



   Four colon polyps



   



   GROSS DESCRIPTION



   



   1) The specimen is received in formalin labeled Hue Du, Ileocecal 
Polyp Biopsy and



   consists of multiple, tan, soft tissue fragments measuring 0.7 x 0.3 x 0.1 
cm.  Submitted



   entirely, one cassette.



   



   2) The specimen is received in formalin labeled Hue Du, Hepatic 
Flexure Biopsy and



   consists of a tan, soft tissue fragment measuring 0.4 x 0.3 x 0.3 cm.  
Submitted entirely,



   



   ** CONTINUED ON NEXT PAGE **



   



   * ML=Testing performed at Main Lab



   DEPARTMENT OF PATHOLOGY,  [facility] [employer] [place], [place] [address]



   Phone # [phone]      Fax #[phone]



   Duane Hopkins M.D. Director     [place] [employer]  #[id number]



   



   



   



   RUN DATE: 05/15/13               [facility] LAB **LIVE**         
       PAGE    2



   RUN TIME: 1502             [address] [place] [address]   [address]



   Specimen Inquiry



   



   -----------------------------------------------------------------------------
---------------



   Patient: HUE DU                      [id number]     (Continued)



   -----------------------------------------------------------------------------
---------------



   



   GROSS DESCRIPTION          (Continued)



   



   GROSS DESCRIPTION          (Continued)



   



   one cassette.



   



   3) The specimen is received in formalin labeled Hue Du, Rectosigmoid 
Polyp at 17 cm.



   and consists of multiple, tan-pink, polypoid fragment measuring 0.7 x 0.7 x 
0.4 cm.



   Submitted entirely, one cassette.



   



   4) The specimen is received in formalin labeled Hue Du, Rectosigmoid 
Polyp at 11 cm.



   and consists of a polypoid mass measuring 0.6 x 0.5 x 0.3 cm.  Submitted 
entirely, one



   cassette.



   



   1)



   



   Signed __________(signature on file)___________ Duane Hopkins MD 05/15/
13 1502



   



   -----------------------------------------------------------------------------
---------------



   



   



   



   



   



   



   



   



   



   



   



   



   



   



   



   



   



   



   



   



   



   



   



   



   



   



   ** END OF REPORT **



   



   * ML=Testing performed at Main Lab



   DEPARTMENT OF PATHOLOGY,  [address] [place] [address]



   Phone # [phone]      Fax #[phone]



   Duane Hopkins M.D. [facility] Permit  #[id number]

 

 31  ---------------------------------------------------------------------------
----



   -------------



   



   RUN DATE: 08/15/12               [facility] NMI **LIVE**



   PAGE 1



   RUN TIME: 6944                            Specimen Inquiry



   RUN USER: INTERFACE



   -----------------------------------------------------------------------------
--



   -------------



   



   Name: HUE DU                     Accadeline#: [id number]      Status: REG REF



   Re12



   Age/Sex: 57/F                         Unit#: [id number]       Location: [facility]



    : 53



   -----------------------------------------------------------------------------
--



   -------------



   



   



   Specimen: [id number]    SOUT  Spec Date:    [facility] Dr: Alyson ly MD



   Spec Type: SURGICAL P         Received:     Copies to:



   



   



   SPECIMEN



   



   1) LEFT BACK  2) MID BACK  3) RIGHT BREAST  4) RIGHT AXILLA



   



   HISTORY



   



   CLINICAL INFORMATION:    No history given



   



   



   



   GROSS DESCRIPTION



   



   1) The specimen is received in formalin labelled Hue Tudi, and consists



   of a fragment of white tissue measuring 0.8 x 0.8 x 0.4 cm.  Submitted



   entirely, one cassette labelled 1.



   



   2) The specimen is received in formalin labelled Hue Tudi, Mid Back, and



   consists of a fragment of white tissue measuring 0.8 x 0.8 x 0.4 cm.



   Submitted entirely, one cassette labelled 2.



   



   3) The specimen is received in formalin labelled Hue Tudi, Right Breast,



   and consists of a fragment of white tissue measuring 0.5 x 0.5 x 0.4 cm.



   Submitted entirely, one cassette labelled 3.



   



   4) The specimen is received in formalin labelled Hue Tudi, Right Axilla,



   and consists of one fragment of brown tissue measuring 0.5 x 0.5 x 0.4 cm.



   Submitted entirely, one cassette labelled 4.



   



   ******DIAGNOSIS******



   



   1) Skin, left back, excision:



   Acrochordon.



   



   2) Skin, mid back, excision:



   Acrochordon.



   



   3) Skin, right breast, excision:



   Intradermal melanocytic nevus with prominent neurotized features.



   



   4) Skin, right axilla, excision:



   Intradermal melanocytic nevus with prominent neurotized features.



   



   Signed Electronically by: WILBER MOREAU 08/15/12 1455



   -----------------------------------------------------------------------------
--



   -------------



   



   



   DEPARTMENT OF PATHOLOGY, [address] [place] [address]



   Phone #[phone]   Fax #[phone]   [facility] Permit #[id number]



   010



   Duane Hopkins M.D. Director   Wilber Moreau M.D. Assistant Dir



   remy



   -----------------------------------------------------------------------------
--



   -------------

 

 32  RESULT TAMIR'D

 

 33  Anion gap measurement may be of limited value in the



   presence of any alkalosis, especially in a combined acid



   base disorder.



   .

 

 34  A metabolite of Naproxen, O-desmethylnaproxen, has been



   shown to interfere with the Jendrassik-Radha method for



   measuring total bilirubin.  Samples from patients who have



   taken Naproxen have shown spurious elevation in total



   bilirubin levels.

 

 35  *******Because ethnic data is not always readily available,



   this report includes an eGFR for both -Americans and



   non- Americans.****



   The National Kidney Disease Education Program (NKDEP) does



   not endorse the use of the MDRD equation for patients that



   are not between the ages of 18 and 70, are pregnant, have



   extremes of body size, muscle mass, or nutritional status,



   or are non- or non-.



   According to the National Kidney Foundation, irrespective of



   diagnosis, the stage of the disease is based on the level of



   kidney function:



   Stage Description                      GFR(mL/min/1.73 m(2))



   1     Kidney damage with normal or decreased GFR       90



   2     Kidney damage with mild decrease in GFR          60-89



   3     Moderate decrease in GFR                         30-59



   4     Severe decrease in GFR                           15-29



   5     Kidney failure                       <15 (or dialysis)

 

 36  AA 12

 

 37  PREADMISSION TESTING SAMPLES FOR BLOOD BANK WILL BE HELD FOR



   14 DAYS FROM THE DATE OF COLLECTION *IF* THE FOLLOWING



   CRITERIA ARE MET:



   



   1) THE PATIENT HAS *NOT* BEEN PREGNANT IN THE LAST 3 MONTHS.



   2) THE PATIENT HAS *NOT* BEEN TRANSFUSED IN THE LAST 3



   MONTHS.



   ============================================================



   PREADMISSION TESTING SAMPLES WILL *NOT* BE HELD FOR 14 DAYS



   FROM PATIENTS WHO IN THE LAST 3 MONTHS:



   



   1) HAVE BEEN PREGNANT



   2) HAVE BEEN TRANSFUSED



   



   THESE PATIENTS *MUST* BE COLLECTED WITHIN 3 DAYS OF THE



   SURGERY DATE.

 

 38  Anion gap measurement may be of limited value in the



   presence of any alkalosis, especially in a combined acid



   base disorder.



   .

 

 39  *******Because ethnic data is not always readily available,



   this report includes an eGFR for both -Americans and



   non- Americans.****



   The National Kidney Disease Education Program (NKDEP) does



   not endorse the use of the MDRD equation for patients that



   are not between the ages of 18 and 70, are pregnant, have



   extremes of body size, muscle mass, or nutritional status,



   or are non- or non-.



   According to the National Kidney Foundation, irrespective of



   diagnosis, the stage of the disease is based on the level of



   kidney function:



   Stage Description                      GFR(mL/min/1.73 m(2))



   1     Kidney damage with normal or decreased GFR       90



   2     Kidney damage with mild decrease in GFR          60-89



   3     Moderate decrease in GFR                         30-59



   4     Severe decrease in GFR                           15-29



   5     Kidney failure                       <15 (or dialysis)

 

 40  ---------------------------------------------------------------------------
-----------------



   RUN DATE: 12               [facility] NMI **LIVE**         
       PAGE 1



   RUN TIME: 903                            Specimen Inquiry



   RUN USER: INTERFACE



   -----------------------------------------------------------------------------
---------------



   Name: HUE DU#: [id number]      Status: REG REF  
   Re12



   Age/Sex: 57/F                         Unit#: [id number]       Location: [facility]. : 53



   -----------------------------------------------------------------------------
---------------



   



   SPEC #: [id number]      TIFFANY:      STATUS:  COMP           
REQ #: [id number]



   RECD:      [facility] DR: Erika SPARKS,[place]



   SOURCE: URINE              ENTR:      KASSI DR: Wendy SPARKS,
Alyson DÍAZ



   SPDESC:



   ORDERED:  URINE C   S



   QUERIES:  SPECIMEN DESCRIPTION: URINE, RANDOM



   ACT WKST: UR 12 #1



   -----------------------------------------------------------------------------
---------------



   Procedure                         Result                         Verified   
        Site



   -----------------------------------------------------------------------------
---------------



   > URINE CULTURE   SENSITIVI  Final                                 12-
0903       ML



   SCANT NORMAL URETHRAL OR PERINEAL LEVI



   



   -----------------------------------------------------------------------------
---------------



    - [facility] Permit #[id number]



   [address] [place] [address]        Ph: [phone]  Fax: [phone]



   



   



   



   



   



   



   



   



   



   



   



   



   



   



   



   



   



   



   



   



   



   



   



   



   



   



   



   



   



   -----------------------------------------------------------------------------
---------------



   DEPARTMENT OF PATHOLOGY, [address] [place] [address]



   Phone #[phone]   Fax #[phone]   [facility] Permit #[id number]



   Duane Hopkins M.D. Director   Wilber Moreau M.D. 



   -----------------------------------------------------------------------------
---------------

 

 41  Anion gap measurement may be of limited value in the



   presence of any alkalosis, especially in a combined acid



   base disorder.



   .

 

 42  *******Because ethnic data is not always readily available,



   this report includes an eGFR for both -Americans and



   non- Americans.****



   The National Kidney Disease Education Program (NKDEP) does



   not endorse the use of the MDRD equation for patients that



   are not between the ages of 18 and 70, are pregnant, have



   extremes of body size, muscle mass, or nutritional status,



   or are non- or non-.



   According to the National Kidney Foundation, irrespective of



   diagnosis, the stage of the disease is based on the level of



   kidney function:



   Stage Description                      GFR(mL/min/1.73 m(2))



   1     Kidney damage with normal or decreased GFR       90



   2     Kidney damage with mild decrease in GFR          60-89



   3     Moderate decrease in GFR                         30-59



   4     Severe decrease in GFR                           15-29



   5     Kidney failure                       <15 (or dialysis)

 

 43  Recommended INR for Patients



   on Oral Anticoagulants



   Prophylaxis                       2.0 - 3.0



   Treatment of thrombosis           2.0 - 3.0



   Prevention of embolism            2.0 - 3.0



   Prevention of embolism from



   prosthetic heart valves         2.5 - 3.5

 

 44  DIAGNOSIS,TREATMENT,AND THERAPY MUST BE BASED ON THE INR



   VALUE ALONE.

 

 45  result reckd'

 

 46  RESULT TAMIR'D

 

 47  RESULT TAMIR'D







Procedures







 Date  CPT Code  Description  Status  Comment

 

 2018  22448  Electrocardiogram Complete  Completed  

 

 2018  45887  Remove Skin Tags Up To 15  Completed  

 

 2017  02015  Finger Or Heel Stick  Completed  

 

 2017    Colonoscopy  Completed  

 

 2017    Diabetic Retinal Eye Exam  Completed  Dr Min  No Diabetes



         retinopathy

 

 2016  49254  Finger Or Heel Stick  Completed  

 

 2016  70782  Finger Or Heel Stick  Completed  

 

 2016  16606  Pulse Oximetry  Completed  

 

 2015  93077  Finger Or Heel Stick  Completed  

 

 2015  64223  Finger Or Heel Stick  Completed  

 

 2014  10625  Finger Or Heel Stick  Completed  

 

 2014    Colonoscopy  Completed  

 

 2013    Colonoscopy  Completed  

 

 2013    Mammogram  Completed  

 

 2013  41633  Excise Benign Lesion 1.1-2CM  Completed  



     Trunk/Arm/Leg    

 

 2013  95098  Biopsy Skin Lesion Single  Completed  

 

 04/15/2013    Mammogram  Completed  can't  take time off



         from work.

 

 04/15/2013  85131  Finger Or Heel Stick  Completed  

 

 2013  10234  Finger Or Heel Stick  Completed  

 

 08/10/2012  60106  Shave Skin Lesion   <.6CM  Completed  



     Trunk/Arm/Leg    

 

 08/10/2012  07971  Remove Skin Tags Up To 15  Completed  

 

 08/10/2012  20395  Shave Skin Lesion   <.6CM  Completed  



     Trunk/Arm/Leg    

 

 2012  07159  Finger Or Heel Stick  Completed  

 

 2012  02751  Finger Or Heel Stick  Completed  

 

 2012  71634  Finger Or Heel Stick  Completed  

 

 2012  54124  Finger Or Heel Stick  Completed  

 

 2012  86945  Finger Or Heel Stick  Completed  

 

 2012  55656  Finger Or Heel Stick  Completed  

 

 2012  93921  Finger Or Heel Stick  Completed  

 

 2012  88549  Finger Or Heel Stick  Completed  

 

 2012  91439  Finger Or Heel Stick  Completed  

 

 2012  04060  Finger Or Heel Stick  Completed  

 

 2012  69885  Finger Or Heel Stick  Completed  

 

 2012  07645  Electrocardiogram Complete  Completed  

 

 2011  06501  Electrocardiogram Complete  Completed  







Encounters







 Type  Date  Location  Provider  CPT E/M  Dx

 

 Office Visit  2018  9:00a  Main Office  Shahida Eid NP  45333  
E11.65









 I10









 Office Visit  2018  1:00p  [facility] Office  Alyson Nguyen M.D.  83351  
Z01.818









 M17.12

 

 E11.65

 

 I10









 Office Visit  2018  3:20p  [facility]  Alyson Nguyen M.D.  
41609  K21.9









 L91.8

 

 S90.852A

 

 M25.561









 Office Visit  2017 11:30a  Main Office  Shahida Eid NP  28981  
R04.0

 

 Office Visit  2017  6:40p  [facility] Office  Alyson Nguyen M.D.  77072  
E11.65









 Z12.31









 Office Visit  2017  6:15p  [facility] Office  KIRSTEN Cottrell  39253  N39.0

 

 Office Visit  2017  5:40p  [facility] Office  Alyson Nguyen M.D.  52575  
E11.65









 R19.7

 

 F43.21









 Office Visit  2016 11:30a  [facility]  Dia KIRSTEN Do  
39257  R05









 R19.7









 Office Visit  2016  3:30p  [facility] Office  Alyson Nguyen M.D.  00219  
M79.605









 E11.65









 Office Visit  2016  7:20p  [facility] Office  Alyson Nguyen M.D.  97258  
E11.65









 F43.21

 

 M76.32

 

 K58.0









 Office Visit  2016  6:30p  [facility] Office  Alyson Nguyen M.D.  75233  
E11.65









 F43.21









 Office Visit  2016  7:30p  Main Office  Nikunj Pittman  46394  
J18.9

 

 Office Visit  12/10/2015  9:40a  [facility]  Alyson Nguyen M.D.  
94361  E11.65









 M79.641

 

 M79.642

 

 E55.9









 Office Visit  10/12/2015  4:20p  [facility] Office  Alyson Nguyen M.D.  60655  
E11.65









 M15.0

 

 I10









 Office Visit  2015  9:40a  [facility]  Alyson Nguyen M.D.  
31833  250.02









 578.1









 Office Visit  2015  8:30a  Main Office  Anaid Chung Evernp-C  30664  
847.1

 

 Office Visit  2015  9:40a  [facility] Office  Alyson Nguyen M.D.  18788  
250.02









 381.89









 Office Visit  2014  5:40p  [facility] Office  Alyson Nguyen M.D.  38140  
250.00

 

 Office Visit  10/02/2014 11:30a  [facility]  Alyson Nguyen M.D.  
90541  250.02









 278.00

 

 305.1

 

 780.52









 Office Visit  2014  6:45p  [facility] Office  Rain CatherineGIBSON  38363  250.02









 305.1

 

 782.9









 Office Visit  2014  6:15p  [facility] Office  Rain CatherineGIBSON  20548  461.0









 466.0









 Office Visit  2013 10:50a  [facility]  Alyson Nguyen M.D.  
33770  250.02









 715.00

 

 787.91

 

 305.1

 

 v03.82









 Office Visit  2013 11:20a  [facility] Office  Alyson Nguyen M.D.  96558  
709.2









 V58.32









 Office Visit  2013  2:00p  [facility]  Alyson Nguyen M.D.  
87926  709.2

 

 Office Visit  04/15/2013  5:40p  [facility] Office  Alyson Nguyen M.D.  82878  
715.00









 250.02

 

 701.4

 

 v06.5









 Office Visit  2013  9:30a  [facility]  KIRSTEN Cottrell  38765  
250.02









 250.00









 Office Visit  10/10/2012 11:45a  [facility] Office  Hue Villarreal, [facility]  72830  461.0

 

 Office Visit  08/10/2012  2:30p  [facility]  Alyson Nguyen M.D.  
22246  782.9









 701.9

 

 216.5









 Office Visit  2012 11:30a  [facility] Office  Alyson Nguyen M.D.  78779  
715.00









 453.40

 

 250.00

 

 782.9

 

 v58.61









 Office Visit  2012  9:20a  [facility] Office  Alyson Nguyen M.D.  76378  
250.00









 719.46

 

 453.40

 

 V58.61









 Office Visit  2012  9:00a  [facility]  Alyson Nguyen M.D.  
61259  453.40









 728.85

 

 715.00

 

 v58.61









 Office Visit  2012  5:40p  [facility]  Alyson Nguyen M.D.  38862  
719.46









 250.00

 

 401.1

 

 728.85









 Office Visit  2012  6:20p  [facility]  Alyson Nguyen M.D.  04663  
250.00

 

 Office Visit  2012 10:00a  [facility]  Alyson Nguyen M.D.  
96274  250.00

 

 Office Visit  2012 11:00a  [facility] Office  Alyson Nguyen M.D.  38137  
715.00









 272.2

 

 790.6

 

 V72.83









 Office Visit  2012  9:10a  [facility] Office  Alyson Nguyen M.D.  93143  
305.1

 

 Office Visit  2011 10:20a  [facility]  Alyson Nguyen M.D.  
96964  719.46









 V72.83







Plan of Care

2018 - Shahida Eid, NPR42 Dizziness and giddinessComments:Do not 
drive or operate heavy machinery if experiencing dizziness. Make sure you are 
drinking at least 8-10 glasses of water a dayReturn or seek medical care for 
the following: numbness or tingling of your extremitieschest painsevere 
headachechange in your visionchange in speech loss of consciousness Notify 
office if worsening symptoms or failure to improve.   Check blood glucose daily 
in the morningbefore breakfast and with any episode of dizziness/sweating. 
Please keep a logFollow up:2-4 [email or web address] Type 2 diabetes mellitus with 
hyperglycemiaComments:Recommend yearly diabetic eye and foot exams, and check 
on blood pressure periodically. Goal blood sugar is less than 140 in the 
morning or A1c less than 7.I10 Essential (primary) hypertensionComments:The 
patient will continue to monitor blood pressure and let me know the blood 
pressure results if there are readings persistently above 140/80. Goal blood 
pressure is less than 140/80. Recommend low salt/cardiac diet and routine 
exercise.AllComments:~B_~U_Medication Management~b_~u_ Patient Understands 
medications she's taking?     Yes    No  Are there Barriers to Adherence?    
Yes    No  Has the patient been asked about herbal supplements and therapies, 
and OTC meds?      Yes    No     ~B_~U_Care Plan~b_~u_1.  Patient has been 
queried about patient's goals/preferences and functional/lifestyle goals at 
relevant visits.  If relevant, describe: na2.  Treatment goals as explained to 
the patient: above3.  Are there barriers to meeting treatment goals?  Yes    No
      If Yes, please describe:4.  Self-Management goals as described to the 
patient:  Yes     NoFollow up:As always, we strongly encourage a healthy diet 
and making physical activity a part of your every day life. If you have 
questions about how or where to start, please contact the office.

## 2018-08-02 NOTE — RAD
Indication: Chest pain, shortness of breath.



Contrast: Administered 94.2 ml of VISAPAQUE 320 mg/ml



CTA of the chest performed after IV contrast administration. Coronal and sagittal

reconstructed images were obtained.



The pulmonary arterial tree is well opacified. There are no filling defects present to

suggest pulmonary embolus.



There is no evidence of mediastinal or hilar adenopathy noted. The heart is of normal size

without evidence of pericardial effusion.



The trachea and major bronchi appear patent. Lung fields demonstrate no evidence of

pleural fluid, nodules or masses.



The aorta demonstrates no evidence of thoracic aortic dissection or aneurysmal dilatation.

The visualized abdominal organs are unremarkable.



IMPRESSION: No evidence of pulmonary embolus is noted.



No evidence of thoracic aortic dissection is noted.

## 2018-08-02 NOTE — RAD
INDICATION:  Chest pain.



COMPARISON:  Comparison is made with a prior chest x-ray study from July 06, 2017.



TECHNIQUE: A portable view of the chest was obtained.



FINDINGS: Cardiac and mediastinal contours appear to be within normal limits.



The lungs are clear. No pleural effusion is seen.



IMPRESSION:  NO EVIDENCE FOR ACUTE DISEASE.

## 2018-08-02 NOTE — XMS REPORT
Hue Du

 Created on:2018



Patient:Hue Du

Sex:Female

:1955

External Reference #:[id number]





Demographics







 Address  [address] [id number]

 

 Home Phone  [phone]

 

 Mobile Phone  [phone]

 

 Preferred Language  English

 

 Marital Status  Not  Or 

 

 Worship Affiliation  Unknown

 

 Race  White

 

 Ethnic Group  Not  Or 









Author







 Organization  [facility]

 

 Address  [address] [place] [address]

 

 Phone  [phone]









Support







 Name  Relationship  Address  Phone

 

 Steven Fofana  Niece/Nephew  [address] [facility]  [phone]



     [place] [address]  









Care Team Providers







 Name  Role  Phone

 

 Alyson Nguyen  Care Team Information   Unavailable

 

 Alyson Nguyen  Primary Care Physician  Unavailable









Payers







 Type  Date  Identification Numbers  Payment Provider  Subscriber

 

 Commercial  Effective:  Policy Number: [id number]  Medicare Blue Ppo  Hue Du



   2018      









 Group Number: [id number]  [address] [address]

 

 PayID: [id number]  [place] [address]







Problems







 Date  Description  Provider  Status

 

 Onset: 2011  Arthralgia of the lower leg  Alyson Nguyen M.D.  Active

 

 Onset: 2012  Type 2 diabetes mellitus  Alyson Nguyen M.D.  Active

 

 Onset: 2012  Embolism from thrombosis of vein of  Alyson Nguyen M.D.  Active



   distal lower extremity    

 

 Onset: 2012  Degenerative joint disease involving  Alyson Nguyen M.D.  Active



   multiple joints    

 

 Onset: 2012  Symptom of skin and integumentary  Alyson Nguyen M.D.  
Active



   tissue    

 

 Onset: 04/15/2013  Type II diabetes mellitus  Alyson Nguyen M.D.  Active



   uncontrolled    

 

 Onset: 04/15/2013  Keloid scar  Alyson Nguyen M.D.  Active

 

 Onset: 2013  Tobacco user  Alyson Nguyen M.D.  Active

 

 Onset: 10/12/2015  Type II diabetes mellitus  Alyson Nguyen M.D.  Active



   uncontrolled    







Family History







 Date  Family Member(s)  Problem(s)  Comments

 

   General  Paternal siblings with Colon CANo fam hx  



     MI.stroke,DM, Lung, Breast CA.  

 

   Father   82, old age. Pacemaker.  

 

   Mother   60's MVA.  Had been healthy.  

 

   Number of Children  None  

 

   Number of Siblings  6 siblings.   Sister with heart diseaseNo cancer or  



     diabetes2 uncles and an aunt had colon cancer  







Social History







 Type  Date  Description  Comments

 

 Education    Highest level of education  



     completed is 12th grade  

 

 Marital Status    Patient is   

 

 Living Situation     no children.  

 

 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  

 

 Cigarette Use    Former Cigarette Smoker   QUIT  OCTOBER 3, 2014.



       light smoker for 10



       years.

 

 ETOH Use    Occasional  on Sundays, a couple of



       glasses of wine.

 

 Smoking    Patient is a former smoker  is on chantix.

 

 Daily Caffeine    Consumes on average 3 cups  non dairy creamer.



     of coffee per day  

 

 Exercise Type/Frequency    exercising knee. uses  



 Current    bicycle.  treadmill - up to  



     6 minutes daily.  

 

 Seat Belt/Car Seat    Always uses a seat belt  







Allergies, Adverse Reactions, Alerts







 Date  Description  Reaction  Status  Severity  Comments

 

 2011  Penicillins    active    Flu Like Symptoms

 

 2012  Lisinopril  psychological - got very  active    



     "mean" and argumentative      

 

 08/10/2012  Morphine  GI upset  active    

 

 2018  Aspirin  internal bleeding  active    internal bleeding.







Medications







 Medication  Date  Status  Form  Strength  Qnty  SIG  Indications  Ordering



                 Provider

 

 Omeprazole  /  Active  Capsules  20mg  90cap  1 by mouth  K21.9  Alyson nevarez  every day    ELIZABETH Nguyen

 

 Blood Pressure  /  Active  Kit    1unit  Take blood  I10  Shahida Bettencourt  2018        s  pressure    Eid,



             every    NP



             morning    



             with feet    



             flat on the    



             floor and    



             as needed    



             for    



             headache,    



             dizziness    

 

 Irbesartan  /  Active  Tablets  150mg  30tab  1 by mouth  E11.65  Alyson Thakur        s  every day    ELIZABETH Nguyen

 

 Ranitidine HCL  /  Active  Tablets  300mg  90tab  take one  K21.9  Alyson Thakur        s  tablet by    gerri Nguyen at    M.D.



             bedtime    

 

 Lorazepam  /  Active  Tablets  1mg  30tab  take  F43.21  Shahida Chambers



   2016        s  one-half or    Eid,



             one tablet    NP



             by mouth at    



             at bedtime    



             for sleep.    

 

 Glipizide  /  Active  Tablets  5mg  60tab  1 by mouth    Alyson ARJUN



           s  in the    Wendy,



             morning.    M.D.

 

 Freestyle  /  Active  Misc    1Box  test every    Alyson Strickland  2015          day dx:    Wendy



             250.00,    [email or web address]



             last visit    



             3/9/15    

 

 Freestyle Lite  /  Active  Device    1unit  testing    Alyson DÍAZ



 Blood Glucose          s  every day    Jose Nguyen            or as    M.GARTH



 System            directed,    



             dx 250.00,    



             last visit    



             3/09/15    

 

 Freestyle Lite  /  Active  Strips    1box  test once    Alyson DÍAZ



 Test            daily dx:    Wendy



             250.00,    M.D.



             last visit    



             3/9/15    

 

 Metformin HCL  /  Active  Tablets  1000mg  60tab  1 by mouth    Alyson ARJUN



           s  twice a day    ELIZABETH Nguyen

 

 Pravastatin  10/02/  Active  Tablets  10mg  90tab  1 by mouth  E11.65  Alyson DÍAZ



 Sodium          s  at night    ELIZABETH Nguyen

 

 Centrum Silver  /  Active  Tablets  Adult 50    one by    Unknown



 Adult 50+  0000          mouth daily    

 

 Garlic Oil  /  Active  Capsules  1000mg    One by    Unknown



   0000          mouth once    



             a day    

 

 Omega 3  /  Active  Capsules  1000mg    1 by mouth    Unknown



   0000          once daily    

 

 Oxycodone-Acetam  /  Active  Tablets  5-325mg    1 by mouth    Unknown



 inophen  0000          every 4    



             hours as    



             needed for    



             pain    

 

                 

 

 Cholestyramine  /  Hx  Powder  4GM/Dose  378gm  2 grams  R19.7  Alyson DÍAZ



    -          daily in 1    Wendy,



   /          cup of    MMEMO



   2017          water, work    



             up to 4    



             grams in    



             water/liqui    



             d daily    

 

 Azithromycin  /  Hx  Tablets  250mg  7tabs  2 take by  MEL Alvares



    -          mouth    Hi,



   /          tabletstoda    FNP



   2017          y,then one    



             tab days    



             2-5 until    



             finished    

 

 Proair HFA  /  Hx  Aerosol  108(90Bas  8.500  2 puffs  MEL Alvares



    -      e)  gm  every 4    Hi,



   /      mcg/Act    hours as    FNP



   2018          needed    

 

 Hydrocodone-Acet  /  Hx  Tablets  7.5-325mg  120ta  1 by mouth  M79.605  
Alyson DÍAZ



 aminophen   -        bs  four times    Wendy,



   /          daily    M.D.



   2018              

 

 Gabapentin  /  Hx  Capsules  300mg  180ca  take one  M76.32  Alyson DÍAZ



    -        ps  capsule by    Wendy,



   /          mouth every    M.D.



   2016          morning and    



             1 in the    



             evening    

 

 Xifaxan  /  Hx  Tablets  200mg  21tab  1 by mouth  K58.0  lAyson DÍAZ



    -        s  three times    Wendy,



   /          daily x 7    M.D.



   2016          days    

 

 Physical Therapy  /  Hx        iliotibial  M76.32  Alyson DÍAZ



   2016 -          band    Wendy,



   /          syndrome.    M.D.



   2016              

 

 Guaifenesin ER  /  Hx  Tablets  600mg  30tab  1 po bid  J18.9  Ale



    -    ER 12HR    s      Thompson,



   /              Afnp-C



   2016              

 

 Vitamin D  12/10/  Hx  Capsules  12251Uttf  12cap  take 1    Alyson DÍAZ



 (Ergocalciferol)   -        s  capsule by    Wendy,



   /          mouth once    M.D.



   2016          monthly    

 

 Mobic  10/12/  Hx  Tablets  7.5mg  30tab  1 by mouth  M15.0  Alyson DÍAZ



    -        s  every    Wendy,



   /          morning.    M.D.



   2016              

 

 Flexeril  /  Hx  Tablets  10mg  30tab  1 po tid  728.85  Anaid



    -        s  sullyn    Juliet,



   /              Afnp-C



                 

 

 Chantix  10/16/  Hx  Tablets  1mg  60tab  1 by mouth  305.1  Alyson DÍAZ



    -        s  bid.    Wendy,



   /              M.D.



                 

 

 Metformin HCL  10/02/  Hx  Tablets  500mg  180ta  1 by mouth  250.02  Dia



   2014        bs  twice    Hi,



   /          daily.    FNP



                 

 

 Chantix Starting  /  Hx  Tablets  0.5mg X  1pack  use as  305.1  Rain



 Steff Fcuhs  2014 & 1 mg    directed    GIBSON Catherine



   /      X 42        



                 

 

 Clarithromycin  /  Hx  Tablets  500mg  20tab  1 po bid x  461.0  Rain



    -        s  10 days    GIBSON Catherine



   2014              

 

 Januvia  /  Hx  Tablets  100mg  30tab  take one  250.02  Dia



   2013  tablet by    Hi,



   /          mouth one    FNP



             time daily    

 

 Chantix Starter  /  Hx      1unit  o.5 mg  305.1  Alyson Suarez   -        s  daily x 3    Wendy,



   /          days.  0.5    M.D.



   2013          mg bid day    



             4-7.  1 mg    



             po bid    



             thereafter.    

 

 Chantix  /  Hx  Tablets  0.5mg  93tab  1 po daily  305.1  Alyson DÍAZ



   2013  x 3 days. 1    Wendy,



   /          po bid days    M.D.



             4-7 2 po    



             bid daily    



             thereafter    

 

 Chantix  /  Hx  Tablets  1mg  60tab  1 po bid.  305.1  Alyson DÍAZ



   2013  after    Wendy,



   /          finished    M.D.



             with    



             starter    



             pack.    

 

 Irbesartan  /  Hx  Tablets  75mg  30tab  1 by mouth  E11.65  Alyson DÍAZ



    -        s  every day    Wendy



   /              M.D.



   2018              

 

 Griselda Contour  /  Hx  Strips    100un  test as    Alyson DÍAZ



 Blood Glucose   -        its  directed    Wendy,



 Test Strips  /          twice a day    M.D.



                 

 

 Metformin HCL  /  Hx  Tablets  1000mg  60tab  take one  250.02  Hue



    -        s  tablet by    Cherelle,



   /          mouth twice    FNP



             a day    

 

 Azithromycin  /  Hx  Tablets  500mg  5tabs  1 po qd x  461.0  Hue



    -          5d    Cherelle,



   /              FNP



                 

 

 Azithromycin  10/10/  Hx  Tablets  250mg  12tab  take 2  461.0  Hue



    -        s  tablets by    Cherelle,



   /          mouth  x 3d    FNP



             then take 1    



             tablet    



             daily for    



             next 6 days    

 

 Proair HFA  10/10/  Hx  Aerosol  108(90Bas  1unit  2 puffs q  461.0  Hue



    -      e) mcg/ac  s  3-4h prn    Cherelle,



   /          cough/wheez    FNP



             e/sob    

 

 Robitussin A-C  10/10/  Hx      4Oz  1-2 tsp po  461.0  Hue



   2012 -          q4h prn    Cherelle,



   /          cough    FNP



                 

 

 Hydrocodone/Acet  /  Hx  Tablets  2.5-500mg  30tab  1 po at hs  715.00  
Alyson DÍAZ



 aminorony   -        s      Wendy,



   10/10/              M.D.



                 

 

 Lorazepam  /  Hx  Tablets  1mg  30tab  1 po tid    Wendy,



    -        s  for leg    Alyson DÍAZ



   /          crampinge    



                 

 

 Hydrocodone/Acet  /  Hx  Tablets  5-500mg  60tab  1 po bid  719.46  
Alyson DÍAZ



 aminophen   -        s      Wendy,



   08/10/              M.D.



                 

 

 Warfarin Sodium  /  Hx  Tablets  5mg  90tab  2 po qd or    Alyson DÍAZ



    -        s  as directed    Wendy,



   08/10/              M.D.



                 

 

 Flexeril  /  Hx  Tablets  10mg  60tab  1 po bid  728.85  Alyson DÍAZ



    -        s      Wendy,



                 M.D.



                 

 

 Oxycodone/Acetam  /  Hx  Tablets  5-325mg  90tab  1-2 po q4  453.40  
Alyson DÍAZ



 inorony   -        s  hrs prn    Wendy,



   /          pain    M.D.



                 

 

 Lovenox  /  Hx  Solution  120mg/0.8  3unit  inject sq    Alyson DÍAZ



    -      ML  s  bid    Wendy,



                 M.D.



                 

 

 Lisinopril  /  Hx  Tablets  5mg  30tab  1 po qd    Alyson DÍAZ



    -        s      Wendy,



                 M.D.



                 

 

 Contour Blood  /  Hx      1Box  use to  250.00  Alyson DÍAZ



 Test Strips  2012 -          measure    Wendy,



   /          blood sugar    M.D.



   2013          bid    

 

 Lancets  /  Hx      200un  test blood  250.00  Alyson DÍAZ



   2012 -        its  sugar twice    Wendy,



   /          daily mail    M.D.



   2015          to patient.    

 

 Metformin HCL  /  Hx  Tablets  500mg  100ta  1 po bid  250.00  Alyson DÍAZ



   2012 -        bs      Wendy,



   /              M.D.



   2013              

 

 Chantix Starter  /  Hx      1unit  o.5 mg  305.1  Alyson DÍAZ



 Pack   -        s  daily x 3    Wendy,



   /          days.  0.5    M.D.



   2012          mg bid day    



             4-7.  1 mg    



             po bid    



             thereafter.    

 

 Lorazepam  /  Hx  Tablets  1mg  30tab  1 po tid    Unknown



   0000 -        s  for leg    



   /          crampinge    



                 

 

 Fish Oil  /  Hx  Capsules  1200mg    1 po qd    Unknown



   0000 -    DR          



   2017              







Immunizations







 CPT Code  Status  Date  Vaccine  Lot #

 

 [id number]  Given  2013  Pneumococcal Immunization  [id number]

 

 75041  Given  04/15/2013  Tdap Tetanus, W Pertussis  [id number]







Vital Signs







 Date  Vital  Result  Comment

 

 2018  BP Systolic  124 mmHg  









 BP Diastolic  72 mmHg  

 

 Heart Rate  96 /min  

 

 Body Temperature  97.9 F  

 

 Respiratory Rate  16 /min  

 

 Height  63.5 inches  5'3.50"

 

 Weight  266.25 lb  

 

 BMI (Body Mass Index)  46.4 kg/m2  









 2018  BP Systolic  124 mmHg  









 BP Diastolic  68 mmHg  

 

 Heart Rate  74 /min  

 

 Body Temperature  98.0 F  

 

 Respiratory Rate  17 /min  

 

 Height  63.5 inches  5'3.50"

 

 Weight  267.50 lb  

 

 BMI (Body Mass Index)  46.6 kg/m2  









 2018  BP Systolic  140 mmHg  









 BP Diastolic  70 mmHg  

 

 Heart Rate  92 /min  

 

 Body Temperature  97.9 F  

 

 Respiratory Rate  18 /min  

 

 Height  63.5 inches  5'3.50"

 

 Weight  267.00 lb  

 

 BMI (Body Mass Index)  46.5 kg/m2  









 2018  BP Systolic  162 mmHg  









 BP Diastolic  90 mmHg  

 

 Heart Rate  76 /min  

 

 Body Temperature  98.6 F  

 

 Respiratory Rate  16 /min  

 

 Height  63.5 inches  5'3.50"

 

 Weight  276.00 lb  

 

 BMI (Body Mass Index)  48.1 kg/m2  









 2018  BP Systolic  140 mmHg  









 BP Diastolic  78 mmHg  

 

 Heart Rate  64 /min  

 

 Body Temperature  98.0 F  

 

 Respiratory Rate  18 /min  

 

 Height  63.5 inches  5'3.50"

 

 Weight  266.00 lb  

 

 BMI (Body Mass Index)  46.4 kg/m2  









 2017  BP Systolic  145 mmHg  









 BP Diastolic  86 mmHg  

 

 Heart Rate  80 /min  

 

 Body Temperature  97.7 F  

 

 Height  63.5 inches  5'3.50"

 

 Weight  255.00 lb  

 

 BMI (Body Mass Index)  44.5 kg/m2  









 2017  BP Systolic  130 mmHg  









 BP Diastolic  82 mmHg  

 

 Heart Rate  72 /min  

 

 Body Temperature  97.9 F  

 

 Respiratory Rate  16 /min  

 

 Height  63.5 inches  5'3.50"

 

 Weight  255.00 lb  

 

 BMI (Body Mass Index)  44.5 kg/m2  









 2017  BP Systolic  120 mmHg  









 BP Diastolic  80 mmHg  

 

 Heart Rate  88 /min  

 

 Body Temperature  98.3 F  

 

 Respiratory Rate  18 /min  

 

 Height  63.5 inches  5'3.50"

 

 Weight  253.00 lb  

 

 BMI (Body Mass Index)  44.1 kg/m2  









 2017  BP Systolic  142 mmHg  









 BP Diastolic  80 mmHg  

 

 Heart Rate  78 /min  

 

 Body Temperature  98.2 F  

 

 Respiratory Rate  16 /min  

 

 Height  63.5 inches  5'3.50"

 

 Weight  261.00 lb  

 

 BMI (Body Mass Index)  45.5 kg/m2  









 2016  BP Systolic  150 mmHg  









 BP Diastolic  80 mmHg  

 

 Heart Rate  96 /min  

 

 Body Temperature  98.6 F  

 

 Height  63.5 inches  5'3.50"

 

 Weight  260.00 lb  

 

 BMI (Body Mass Index)  45.3 kg/m2  









 2016  BP Systolic  156 mmHg  









 BP Diastolic  74 mmHg  

 

 Heart Rate  78 /min  

 

 Body Temperature  98.6 F  

 

 Respiratory Rate  16 /min  

 

 Height  63.5 inches  5'3.50"









 2016  BP Systolic  156 mmHg  









 BP Diastolic  80 mmHg  

 

 Heart Rate  78 /min  

 

 Body Temperature  98.1 F  

 

 Respiratory Rate  16 /min  

 

 Height  63.5 inches  5'3.50"

 

 Weight  272.00 lb  

 

 BMI (Body Mass Index)  47.4 kg/m2  









 2016  BP Systolic  134 mmHg  









 BP Diastolic  80 mmHg  

 

 Heart Rate  66 /min  

 

 Body Temperature  98.6 F  

 

 Respiratory Rate  16 /min  

 

 Height  63.5 inches  5'3.50"

 

 Weight  273.50 lb  

 

 BMI (Body Mass Index)  47.7 kg/m2  









 2016  BP Systolic  150 mmHg  









 BP Diastolic  80 mmHg  

 

 Heart Rate  88 /min  

 

 Body Temperature  98.2 F  

 

 Respiratory Rate  18 /min  

 

 O2 % BldC Oximetry  99 %  

 

 Height  63.5 inches  5'3.50"

 

 Weight  273.00 lb  

 

 BMI (Body Mass Index)  47.6 kg/m2  









 12/10/2015  BP Systolic  120 mmHg  









 BP Diastolic  82 mmHg  

 

 Heart Rate  72 /min  

 

 Body Temperature  97.9 F  

 

 Respiratory Rate  16 /min  

 

 Height  63.5 inches  5'3.50"

 

 Weight  265.00 lb  

 

 BMI (Body Mass Index)  46.2 kg/m2  









 10/12/2015  BP Systolic  136 mmHg  









 BP Diastolic  66 mmHg  

 

 Heart Rate  90 /min  

 

 Body Temperature  98.8 F  

 

 Respiratory Rate  16 /min  

 

 Height  63.5 inches  5'3.50"

 

 Weight  267.25 lb  

 

 BMI (Body Mass Index)  46.6 kg/m2  









 2015  BP Systolic  120 mmHg  









 BP Diastolic  70 mmHg  

 

 Heart Rate  80 /min  

 

 Body Temperature  98.5 F  

 

 Respiratory Rate  18 /min  

 

 Height  63.5 inches  5'3.50"

 

 Weight  268.00 lb  

 

 BMI (Body Mass Index)  46.7 kg/m2  









 2015  BP Systolic  142 mmHg  









 BP Diastolic  84 mmHg  

 

 Heart Rate  92 /min  

 

 Body Temperature  98.1 F  

 

 Height  63.5 inches  5'3.50"

 

 Weight  267.25 lb  

 

 BMI (Body Mass Index)  46.6 kg/m2  









 2015  BP Systolic  150 mmHg  









 BP Diastolic  84 mmHg  

 

 Heart Rate  66 /min  

 

 Body Temperature  97.8 F  

 

 Respiratory Rate  18 /min  

 

 Height  63.5 inches  5'3.50"

 

 Weight  263.00 lb  

 

 BMI (Body Mass Index)  45.9 kg/m2  









 2014  BP Systolic  138 mmHg  









 BP Diastolic  70 mmHg  

 

 Heart Rate  78 /min  

 

 Body Temperature  98.3 F  

 

 Respiratory Rate  16 /min  

 

 Height  63.5 inches  5'3.50"

 

 Weight  254.00 lb  

 

 BMI (Body Mass Index)  44.3 kg/m2  









 10/02/2014  BP Systolic  140 mmHg  









 BP Diastolic  80 mmHg  

 

 Heart Rate  70 /min  

 

 Body Temperature  97.2 F  

 

 Respiratory Rate  18 /min  

 

 Height  63.5 inches  5'3.50"

 

 Weight  248.00 lb  

 

 BMI (Body Mass Index)  43.2 kg/m2  









 2014  BP Systolic  140 mmHg  









 BP Diastolic  80 mmHg  

 

 Heart Rate  68 /min  

 

 Body Temperature  98.5 F  

 

 Respiratory Rate  18 /min  

 

 Height  63.5 inches  5'3.50"

 

 Weight  245.00 lb  

 

 BMI (Body Mass Index)  42.7 kg/m2  









 2014  BP Systolic  144 mmHg  









 BP Diastolic  90 mmHg  

 

 Heart Rate  80 /min  

 

 Body Temperature  98.0 F  

 

 Respiratory Rate  18 /min  

 

 Height  63.5 inches  5'3.50"

 

 Weight  254.00 lb  

 

 BMI (Body Mass Index)  44.3 kg/m2  









 2013  BP Systolic  146 mmHg  









 BP Diastolic  80 mmHg  

 

 Heart Rate  92 /min  

 

 Body Temperature  97.6 F  

 

 Respiratory Rate  16 /min  

 

 Height  63.5 inches  5'3.50"

 

 Weight  256.00 lb  

 

 BMI (Body Mass Index)  44.6 kg/m2  









 2013  BP Systolic  136 mmHg  









 BP Diastolic  80 mmHg  

 

 Heart Rate  84 /min  

 

 Body Temperature  98.9 F  

 

 Respiratory Rate  18 /min  

 

 Height  63.5 inches  5'3.50"









 2013  BP Systolic  130 mmHg  









 BP Diastolic  72 mmHg  

 

 Heart Rate  80 /min  

 

 Body Temperature  99.1 F  

 

 Height  63.5 inches  5'3.50"









 04/15/2013  BP Systolic  156 mmHg  









 BP Diastolic  86 mmHg  

 

 Heart Rate  78 /min  

 

 Body Temperature  98.5 F  

 

 Respiratory Rate  18 /min  

 

 Height  63.5 inches  5'3.50"

 

 Weight  265.12 lb  

 

 BMI (Body Mass Index)  46.2 kg/m2  









 2013  BP Systolic  130 mmHg  









 BP Diastolic  80 mmHg  

 

 Heart Rate  80 /min  

 

 Body Temperature  98.6 F  

 

 Respiratory Rate  16 /min  

 

 Height  63.5 inches  5'3.50"

 

 Weight  267.00 lb  

 

 BMI (Body Mass Index)  46.5 kg/m2  









 10/10/2012  BP Systolic  120 mmHg  









 BP Diastolic  88 mmHg  

 

 Heart Rate  92 /min  

 

 Body Temperature  99.2 F  

 

 Height  63.5 inches  5'3.50"

 

 Weight  263.00 lb  

 

 BMI (Body Mass Index)  45.9 kg/m2  









 08/10/2012  BP Systolic  140 mmHg  









 BP Diastolic  80 mmHg  

 

 Heart Rate  80 /min  

 

 Body Temperature  98.0 F  

 

 Height  63.5 inches  5'3.50"

 

 Weight  262.00 lb  

 

 BMI (Body Mass Index)  45.7 kg/m2  









 2012  BP Systolic  142 mmHg  









 BP Diastolic  78 mmHg  

 

 Heart Rate  76 /min  

 

 Body Temperature  98.6 F  

 

 Height  63.5 inches  5'3.50"

 

 Weight  254.00 lb  

 

 BMI (Body Mass Index)  44.3 kg/m2  









 2012  BP Systolic  138 mmHg  









 BP Diastolic  78 mmHg  

 

 Heart Rate  88 /min  

 

 Body Temperature  98.2 F  

 

 Height  63.5 inches  5'3.50"

 

 Weight  251.00 lb  

 

 BMI (Body Mass Index)  43.8 kg/m2  









 2012  BP Systolic  120 mmHg  









 BP Diastolic  80 mmHg  

 

 Heart Rate  68 /min  

 

 Body Temperature  98.7 F  

 

 Height  63.5 inches  5'3.50"









 2012  BP Systolic  120 mmHg  









 BP Diastolic  80 mmHg  

 

 Heart Rate  80 /min  

 

 Body Temperature  99.0 F  

 

 Height  63.5 inches  5'3.50"









 2012  BP Systolic  120 mmHg  









 BP Diastolic  80 mmHg  

 

 Heart Rate  68 /min  

 

 Body Temperature  98.2 F  

 

 Height  63.5 inches  5'3.50"

 

 Weight  261.00 lb  

 

 BMI (Body Mass Index)  45.5 kg/m2  









 2012  BP Systolic  120 mmHg  









 BP Diastolic  70 mmHg  

 

 Heart Rate  72 /min  

 

 Height  63.5 inches  5'3.50"

 

 Weight  261.00 lb  

 

 BMI (Body Mass Index)  45.5 kg/m2  









 2012  BP Systolic  118 mmHg  









 BP Diastolic  84 mmHg  

 

 Heart Rate  84 /min  

 

 Body Temperature  98.4 F  

 

 Height  63.5 inches  5'3.50"

 

 Weight  261.00 lb  

 

 BMI (Body Mass Index)  45.5 kg/m2  









 2012  BP Systolic  110 mmHg  









 BP Diastolic  78 mmHg  

 

 Heart Rate  88 /min  

 

 Body Temperature  98.2 F  

 

 Height  63.5 inches  5'3.50"

 

 Weight  258.00 lb  

 

 BMI (Body Mass Index)  45.0 kg/m2  









 2011  BP Systolic  120 mmHg  









 BP Diastolic  70 mmHg  

 

 Heart Rate  80 /min  

 

 Body Temperature  98.8 F  

 

 Height  63.5 inches  5'3.50"

 

 Weight  258.00 lb  

 

 BMI (Body Mass Index)  45.0 kg/m2  







Results







 Test  Date  Test  Result  H/L  Range  Note

 

 Laboratory test finding  2018  Hemoglobin A1c (Fma)  7.2 %  High  4.1-
5.7  

 

 Comprehensive Metabolic  2018  Sodium  139 mEq/L    134-149  



 Prof            









 Potassium  4.6 mEq/L    3.6-5.5  

 

 Chloride  103 mEq/L      

 

 Carbon Dioxide  25 mEq/L    21-32  

 

 Glucose  221 mg/dL  High    

 

 BUN  15 mg/dL    6-26  

 

 Creatinine  0.7 mg/dL    0.6-1.4  

 

 BUN/Creat Ratio  21.4 CALC    8.0-36.0  

 

 Calcium  9.1 mg/dL    8.6-10.2  

 

 Total Protein  6.5 g/dL    6.4-8.3  

 

 Albumin  4.1 g/dL    3.8-5.5  

 

 Globulin  2.4 g/dL    2.0-4.8  

 

 A/G Ratio  1.7 CALC    0.6-2.3  

 

 Alk. Phosphatase  79 U/L      

 

 Alt (SGPT)  12 U/L    7-35  

 

 Ast (Sgot)  8 U/L    5-34  

 

 Total Bilirubin  0.7 mg/dL    0.2-1.3  

 

 GFR Non-African American  >60 ml/min/1.73m^    >=60  

 

 GFR African American  >60 ml/min/1.73m^    >=60  









 CBC Electronic Fma  2018  WBC  6.6 x10^3/UL    4.0-10.0  









 RBC  3.88 x10^6/UL  Low  3.93-6.00  

 

 HGB  11.0 g/dL  Low  12.0-17.0  

 

 HCT  34 %  Low  35-50  

 

 MCV  88.1 fL    80.0-95.0  

 

 MCH  28.4 pg    25.6-32.2  

 

 MCHC  32.2 g/dL    32.2-36.0  

 

 RDW-CV  12.9 %    11.6-14.4  

 

 PLT  291 x10^3/UL    163-400  

 

 MPV  10.3 fL    9.4-12.4  

 

 Favio#  3.81 x10^3/UL    1.56-6.13  

 

 Lymph#  2.00 x10^3/UL    1.18-3.74  

 

 Mono#  0.42 x10^3/UL    0.24-0.82  

 

 Eos #  0.3 x10^3/UL    0.0-0.5  

 

 Baso #  0.06 x10^3/UL    0.01-0.08  

 

 Favio%  57.7 %    34.0-70.0  

 

 Lymph %  30.3 %    20.0-52.0  

 

 Mono%  6.4 %    5.0-12.0  

 

 Eos%  4.7 %    0.7-7.0  

 

 Baso%  0.9 %    0.1-1.2  









 Laboratory test finding  2018  TSH  2.52 mIU/L    0.50-6.00  









 Free T4  1.09 ng/dL    0.75-1.54  









 Laboratory test finding  2018  Hemoglobin A1c ([facility])  7.4 % %  High  4.1-
5.7  

 

 Comprehensive Metabolic Prof  2018  Sodium  135 mEq/L    134-149  









 Potassium  4.2 mEq/L    3.6-5.5  

 

 Chloride  98 mEq/L      

 

 Carbon Dioxide  25 mEq/L    21-32  

 

 Glucose  153 mg/dL  High    

 

 BUN  12 mg/dL    6-26  

 

 Creatinine  0.7 mg/dL    0.6-1.4  

 

 BUN/Creat Ratio  17.1 CALC    8.0-36.0  

 

 Calcium  9.4 mg/dL    8.6-10.2  

 

 Total Protein  6.7 g/dL    6.4-8.3  

 

 Albumin  4.3 g/dL    3.8-5.5  

 

 Globulin  2.4 g/dL    2.0-4.8  

 

 A/G Ratio  1.8 CALC    0.6-2.3  

 

 Alk. Phosphatase  74 U/L      

 

 Alt (SGPT)  26 U/L    7-35  

 

 Ast (Sgot)  13 U/L    5-34  

 

 Total Bilirubin  0.7 mg/dL    0.2-1.3  

 

 GFR Non-African American  >60 ml/min/1.73m^    >=60  

 

 GFR African American  >60 ml/min/1.73m^    >=60  









 Lipid Profile  2018  Cholesterol  224 mg/dL  High  120-200  









 Triglycerides  202 mg/dL  High    

 

 HDL Cholesterol  75 mg/dL    30-85  

 

 LDL (Calculated)  109 CALC    0-129  

 

 VLDL Cholesterol  40 mg/dL    0-50  

 

 HDL Risk Factor  3.0 CALC    0.0-4.4  









 Urinalysis Profile  2018  Urine Color  Yellow      









 Urine Appearance  Clear      

 

 Urine Specific Gravity  1.017    1.010-1.030  

 

 Urine pH  5.0    5-9  

 

 Urine Urobilinogen  Negative    Negative  

 

 Urine Ketones  Negative    Negative  

 

 Urine Protein  Negative    Negative  

 

 Urine Leukocytes  Trace    Negative  

 

 Urine Blood  Negative    Negative  

 

 Urine Nitrite  Negative    Negative  

 

 Urine Bilirubin  Negative    Negative  

 

 Urine Glucose  Negative    Negative  

 

 Urine White Blood Cell  Trace(0-5/hpf)    Absent  

 

 Urine Red Blood Cell  Trace(0-2/hpf)    Absent  

 

 Urine Bacteria  Absent    Absent  

 

 Urine Squamous Epithelial Cell  Present    Absent  









 Laboratory test  2018  Urine Culture And  SEE RESULT BELOW      1



 finding    Sensitivities        

 

 Laboratory test  2017  Hemoglobin A1c ([facility])  6.6 %  High  4.1-5.7  



 finding            

 

 Laboratory test  2017  Surgical Pathology  SEE RESULT BELOW      2



 finding            

 

 Ua - Micro ([facility])  2017  Appearance  clear      









 Color  yellow      

 

 Glucose, Urine ([facility]/CMC/CTX)  neg      

 

 Bilirubin  neg      

 

 Ketones  neg      

 

 SP Grav  <=1.005      

 

 Blood  neg      

 

 PH  5.5      

 

 Protein  neg      

 

 Urobil  0.2      

 

 Nitrite  neg      

 

 Leukocytes ([facility]rex)  small      

 

 WBC ([facility]rex)  4-6      









 Laboratory test finding  2017  Potassium Redraw  4.1 mmol/L    3.5-5.0  









 Ast Redraw  9 U/L  Low  13-39  









 Laboratory test finding  2017  Hemoglobin A1c (Glyco  6.9 %  High  Less 
than 6.0  3



     HGB)        









 Urine Culture And Sensitivities  SEE RESULT BELOW      4









 Urinalysis Profile  2017  Urine Color  Yellow      









 Urine Appearance  Clear      

 

 Urine Specific Gravity  1.019    1.010-1.030  

 

 Urine pH  5.0    5-9  

 

 Urine Urobilinogen  Negative    Negative  

 

 Urine Ketones  Negative    Negative  

 

 Urine Protein  Negative    Negative  

 

 Urine Leukocytes  2+    Negative  

 

 Urine Blood  Negative    Negative  

 

 Urine Nitrite  Negative    Negative  

 

 Urine Bilirubin  Negative    Negative  

 

 Urine Glucose  Negative    Negative  

 

 Urine White Blood Cell  1+(6-10/hpf)    Absent  

 

 Urine Red Blood Cell  Absent    Absent  

 

 Urine Bacteria  Absent    Absent  

 

 Urine Squamous Epithelial Cell  Present    Absent  









 CBC Auto Diff  2017  White Blood Count  15.8 10^3/uL  High  3.5-10.8  









 Red Blood Count  4.83 10^6/uL    4.0-5.4  

 

 Hemoglobin  14.3 g/dL    12.0-16.0  

 

 Hematocrit  43 %    35-47  

 

 Mean Corpuscular Volume  89 fL    80-97  

 

 Mean Corpuscular Hemoglobin  30 pg    27-31  

 

 Mean Corpuscular HGB Conc  33 g/dL    31-36  

 

 Red Cell Distribution Width  14 %    10.5-15  

 

 Platelet Count  295 10^3/uL    150-450  

 

 Mean Platelet Volume  10 um3    7.4-10.4  

 

 Abs Neutrophils  12.2 10^3/uL  High  1.5-7.7  

 

 Abs Lymphocytes  2.2 10^3/uL    1.0-4.8  

 

 Abs Monocytes  1.1 10^3/uL  High  0-0.8  

 

 Abs Eosinophils  0.3 10^3/uL    0-0.6  

 

 Abs Basophils  0.1 10^3/uL    0-0.2  

 

 Abs Nucleated RBC  0 10^3/uL      

 

 Granulocyte %  77.1 %    38-83  

 

 Lymphocyte %  13.8 %  Low  25-47  

 

 Monocyte %  6.9 %    1-9  

 

 Eosinophil %  1.7 %    0-6  

 

 Basophil %  0.5 %    0-2  

 

 Nucleated Red Blood Cells %  0      









 Laboratory test finding  2017  Lactic Acid  1.3 mmol/L    0.5-2.0  5

 

 Laboratory test finding  2017  Lipase  19 U/L    11.0-82.0  6









 C Reactive Protein  9.47 mg/L  High  < 5.00  7









 Comp Metabolic Panel  2017  Sodium  134 mmol/L    133-145  









 Chloride  102 mmol/L    101-111  

 

 Co2 Carbon Dioxide  25 mmol/L    22-32  

 

 Glucose  201 mg/dL  High    8

 

 Blood Urea Nitrogen  9 mg/dL    6-24  9

 

 Creatinine  0.71 mg/dL    0.51-0.95  10

 

 BUN/Creatinine Ratio  12.7    8-20  

 

 Calcium  9.3 mg/dL    8.6-10.3  11

 

 Total Protein  7.5 g/dL    6.4-8.9  12

 

 Albumin  4.2 g/dL    3.2-5.2  13

 

 Globulin  3.3 g/dL    2-4  

 

 Albumin/Globulin Ratio  1.3    1-3  

 

 Total Bilirubin  1.10 mg/dL  High  0.2-1.0  14

 

 Alkaline Phosphatase  70 U/L      15

 

 Alt  16 U/L    7-52  16

 

 Egfr Non-  82.9    >60  

 

 Egfr   106.6    >60  17

 

 Potassium  TNP mmol/L    3.5-5.0  18

 

 Anion Gap  7 mmol/L    2-11  

 

 Ast  TNP U/L    13-39  19









 Laboratory test finding  2017  Stool Culture  SEE RESULT BELOW      20









 O&P Ova & Parasites Full  SEE RESULT BELOW      21

 

 Parasitic Examination  See Comment      22









 Basic Metabolic Profile  2016  Sodium  138 mEq/L    134-149  









 Potassium  4.3 mEq/L    3.6-5.5  

 

 Chloride  100 mEq/L      

 

 Carbon Dioxide  24 mEq/L    21-32  

 

 Glucose  201 mg/dL  High    23

 

 BUN  12 mg/dL    6-26  

 

 Creatinine  0.6 mg/dL    0.6-1.4  

 

 BUN/Creat Ratio  20.0 CALC    8.0-36.0  

 

 Calcium  9.4 mg/dL    8.6-10.2  

 

 GFR Non-African American  >60 ml/min/1.73m^    >=60  

 

 GFR African American  >60 ml/min/1.73m^    >=60  









 Laboratory test finding  2016  Hemoglobin A1c ([facility])  6.5 %  High  4.1-
5.7  

 

 Laboratory test finding  2016  Hemoglobin A1c ([facility])  6.6 %  High  4.1-
5.7  

 

 Comprehensive Metabolic Prof  12/10/2015  Sodium  140 mEq/L    134-149  









 Potassium  4.6 mEq/L    3.6-5.5  

 

 Chloride  97 mEq/L      

 

 Carbon Dioxide  27 mEq/L    21-32  

 

 Glucose  177 mg/dL  High    24

 

 BUN  14 mg/dL    6-26  

 

 Creatinine  0.7 mg/dL    0.6-1.4  

 

 BUN/Creat Ratio  20.0 CALC    8.0-36.0  

 

 Calcium  9.7 mg/dL    8.6-10.2  

 

 Total Protein  7.6 g/dL    6.4-8.3  

 

 Albumin  4.7 g/dL    3.8-5.5  

 

 Globulin  2.9 g/dL    2.0-4.8  

 

 A/G Ratio  1.6 CALC    0.6-2.3  

 

 Alk. Phosphatase  64 U/L      

 

 Alt (SGPT)  19 U/L    7-35  

 

 Ast (Sgot)  15 U/L    5-34  

 

 Total Bilirubin  1.0 mg/dL    0.2-1.3  

 

 GFR Non-African American  >60 ml/min/1.73m^    >=60  

 

 GFR African American  >60 ml/min/1.73m^    >=60  









 Lipid Profile  12/10/2015  Cholesterol  209 mg/dL  High  120-200  









 Triglycerides  151 mg/dL      

 

 HDL Cholesterol  66 mg/dL    30-85  

 

 LDL (Calculated)  113 CALC    0-129  

 

 VLDL Cholesterol  30 mg/dL    0-50  

 

 HDL Risk Factor  3.2 CALC    0.0-4.4  









 Complete Blood Count  12/10/2015  WBC  9.4 x10^3/UL    3.6-9.6  









 RBC  4.60 x10^6/UL    3.90-5.70  

 

 HGB  13.3 g/dL    12.1-17.2  

 

 HCT  41 %    36-50  

 

 MCV  89.0 fL    82.2-97.4  

 

 MCH  29.0 pg    27.6-33.3  

 

 MCHC  32.6 g/dL  Low  33.0-35.5  

 

 RDW  12.9 %    11.6-13.7  

 

 PLT  270 x10^3/UL    150-400  

 

 MPV  7.2 fL  Low  7.4-10.4  

 

 Gran #  6.8 x10^3/UL    1.5-7.2  

 

 Lymph#  2.3 x10^3/UL    0.7-4.9  

 

 Mono#  0.3 x10^3/UL    0.1-0.9  

 

 Gran %  71.2 %    42.2-75.2  

 

 Lymph %  25.2 %    20.5-51.1  

 

 Mono%  3.6 %    1.7-9.3  









 Laboratory test finding  12/10/2015  Vitamin D25  37      









 TSH  1.66 mIU/L    0.50-6.00  

 

 Free T4  1.26 ng/dL    0.75-1.54  









 Laboratory test  12/10/2015  Hemoglobin A1c  6.9 %  High  4.1-5.7  



 finding    ([facility]/CMC,CX)        

 

 Laboratory test  09/15/2015  Clotest  SEE RESULT BELOW      25



 finding            

 

 Ict Hemoccult ([facility])  09/15/2015  Ict Hemoccult (1)  9/4/15 NEG      









 Ict Hemoccult-(2)  9/5/15 NEG      

 

 Ict-Hemoccult (3)  9/6/15 NEG      









 Laboratory test  09/15/2015  Surgical Pathology  SEE RESULT BELOW      26



 finding            

 

 Laboratory test  2015  Stool Occult Blood  SEE RESULT BELOW      27



 finding            

 

 Laboratory test  2015  Hemoglobin A1c  8.1 %  High  4.1-5.7  



 finding    ([facility]/CMC,CX)        

 

 Laboratory test  2015  Hemoglobin A1c  7.7 %  High  4.1-5.7  



 finding    (a/CMC,CX)        

 

 Laboratory test  2014  Hemoglobin A1c  7.8 %  High  4.1-5.7  



 finding    (a/CMC,CX)        

 

 Microalb/Creatinine,  2014  Microalb, Random  17.8 mg/L mg/L    0.5-37  



 Random Ur    ([facility]/CMC/CTX)        









 Urine Creatinine (F/C/CTX)  10.6 mmol/L nmol/L      

 

 Microalb.,Creatinine (F/C/CTX)  1.7mg/mmol      









 Comprehensive Metabolic Prof  2014  Sodium  135 mEq/L    134-149  









 Potassium  4.6 mEq/L    3.6-5.5  

 

 Chloride  98 mEq/L      

 

 Carbon Dioxide  29 mEq/L    21-32  

 

 Glucose  258 mg/dL  High    28

 

 BUN  11 mg/dL    6-26  

 

 Creatinine  0.7 mg/dL    0.6-1.4  

 

 BUN/Creat Ratio  15.7 CALC    8.0-36.0  

 

 Calcium  9.4 mg/dL    8.6-10.2  

 

 Total Protein  7.6 g/dL    6.3-8.1  

 

 Albumin  4.1 g/dL    3.8-5.5  

 

 Globulin  3.5 g/dL    2.0-4.8  

 

 A/G Ratio  1.2 CALC    0.6-2.3  

 

 Alk. Phosphatase  82 U/L      

 

 Alt (SGPT)  17 U/L    7-35  

 

 Ast (Sgot)  13 U/L    5-34  

 

 Total Bilirubin  0.9 mg/dL    0.2-1.3  









 Laboratory test finding  2014  TSH  2.51 mIU/L    0.50-6.00  

 

 Lipid Profile  2014  Cholesterol  265 mg/dL  High  120-200  









 Triglycerides  182 mg/dL      

 

 HDL Cholesterol  51 mg/dL    30-85  

 

 LDL (Calculated)  178 CALC  High  0-129  

 

 VLDL Cholesterol  36 mg/dL    0-50  

 

 HDL Risk Factor  5.2 CALC  High  0.0-4.4  









 Laboratory test  2014  Hemoglobin A1c  9.5 %  High  4.1-5.7  



 finding    ([facility]/CMC,CX)        

 

 Surgical Pathology  2014  S  RUN DATE:      / <SEE      



       NOTE>      

 

 Surgical Pathology  2013  S  RUN DATE:      30



       05/15/ <SEE      



       NOTE>      

 

 Laboratory test  04/15/2013  Hemoglobin A1c  8.9 %  High  4.1-5.7  



 finding    ([facility]/CMC,CX)        

 

 Laboratory test  2013  Hemoglobin A1c  10.1 %  High  4.1-5.7  



 finding    ([facility]/CMC,CX)        

 

 Laboratory test  2012  Surgical Pathology  --------------      31



 finding      -- <SEE NOTE>      

 

 Laboratory test  2012  Inr ([facility])  2.5    2.0-3.0  



 finding            

 

 Laboratory test  2012  Inr ([facility])  3.1  High  2.0-3.0  



 finding            

 

 Laboratory test  2012  Inr ([facility])  2.6    2-3  



 finding            

 

 Laboratory test  2012  Inr ([facility])  3.8  High  2.0-3.0  



 finding            

 

 Laboratory test  2012  Inr ([facility])  2.6  High  0.9-1.1  



 finding            

 

 Laboratory test  2012  Inr ([facility])  1.4  Low  2-3  



 finding            

 

 Laboratory test  2012  Inr ([facility])  2.0    2-3  



 finding            

 

 Laboratory test  2012  Inr ([facility])  2.9    2.0-3.0  



 finding            

 

 Laboratory test  2012  Inr ([facility])  2.5    2.0-3.0  



 finding            

 

 Laboratory test  2012  Inr ([facility])  1.6  Low  2.0-3.0  



 finding            

 

 Laboratory test  2012  Inr ([facility])  1.3  Low  2.0-3.0  



 finding            

 

 CBC Electronic ([facility])  2012  WBC  7.5    3.6-9.6  









 RBC  4.06    3.90-5.70  

 

 Hemoglobin ([facility]/CMC/CTX)  11.5 g/dL  Low  12.1 - 17.2  

 

 Hematocrit ([facility]/CMC/CTX)  35.3 %  Low  36.1 - 50.3  

 

 Platelets  368 10^3/ul    150-400  

 

 Lymph%  32.6    20.5-51.1  

 

 Mixed%  3.4      

 

 Neutrophils %  64.0      

 

 Mean Corpuscular Vol  87    82.2-97.4  

 

 Mean Corpuscular Hemoglobin  28.4    27.6-33.3  

 

 Mean Corpuscular Hemo Concen  32.7    32.0-36.0  

 

 RDW  12.1    11.6-13.7  

 

 Mean Platelet Volume  7.5    6.5-11.0  









 Laboratory test finding  2012  Inr ([facility])  1.1  Low  2.0-3.0  

 

 Comprehensive Metabolic Prof  2012  Albumin  4.5 g/dL    3.8-5.5  









 Alk. Phos.  93 U/L      

 

 Alt (SGPT)  16 U/L    7-35  

 

 Ast (Sgot)  11 U/L    5-34  

 

 BUN  10 mg/dL    6-26  

 

 Calcium  9.9 mg/dL    8.6-10.2  

 

 Chloride  94 mEq/L      

 

 Creatinine  0.8 mg/dL    0.6-1.4  

 

 Carbon Dioxide  26 mEq/L    21-32  

 

 Glucose  218 mg/dL  High    32

 

 Sodium  134 mEq/L    134-149  

 

 Total Bilirubin  0.7 mg/dL    0.2-1.3  

 

 Total Protein  6.9 g/dL    6.3-8.1  

 

 Potassium  4.2 mEq/L    3.6-5.5  

 

 Globulin  2.5 g/dL    2.0-4.8  

 

 A/G Ratio  1.8 Calc    0.6-2.2  

 

 BUN/Creat Ratio  12.8 Calc    8.0-36.0  









 CBC Auto Diff  2012  White Blood Count  9.3 CUMM    4.8-10.8  









 Red Cell Count  3.92 CUMM  Low  4.2-5.4  

 

 Hemoglobin  11.6 g/dL  Low  12.0-16.0  

 

 Hematocrit  34 %  Low  35-47  

 

 Mean Corpuscular Volume  86 um3    79-97  

 

 Mean Corpuscular Hemoglob  30 pg    27-31  

 

 Mean Corpuscular HGB Cone  34 g/dL    32-36  

 

 Redcell Distribution WDTH  13 %    10.5-15  

 

 Platelet Count  305 CUMM    150-450  

 

 Mean Platelet Volume  8.3 um3    7.4-10.4  

 

 Gran %  52.9 %    38-83  

 

 Lymph %  36.7 %    25-47  

 

 Mononuclear %  7.6 %    1-9  

 

 Eosinophil %  1.9 %    0-6  

 

 Basophil %  0.9 %    0-2  

 

 Abs Lymphs  3.4    1.0-4.8  

 

 Abs Mononuclear  0.7    0-0.8  

 

 Absolute Neutrophil Count  4.9    1.5-7.7  

 

 Abs Eosinophils  0.2    0-0.6  

 

 Abs Basophils  0.1    0-0.2  









 Comp Metabolic Panel  2012  Sodium  134 mmol/L  Low  135-145  









 Potassium  4.0 mmol/L    3.5-5.0  

 

 Chloride  100 mmol/L  Low  101-111  

 

 Co2 (Carbon Dioxide)  22.0 mmol/L    22-32  

 

 Anion Gap  12.0 mmol/L  High  2-11  33

 

 Glucose  222 mg/dL  High    

 

 BUN  12 mg/dL    6-24  

 

 Creatinine  0.8 mg/dL    0.50-1.40  

 

 One Over Creatinine  1.25      

 

 BUN/Creatinine Ratio  15.0    8-20  

 

 Calcium  9.8 mg/dL    8.1-9.9  

 

 Total Protein  6.5 GM/DL    6.2-8.1  

 

 Albumin  3.9 GM/DL    3.6-5.4  

 

 Globulin  2.6 GM/DL    2-4  

 

 Albumin/Globulin Ratio  1.5    1-3  

 

 Bilirubin Total  0.7 mg/dL    0.4-1.5  34

 

 Alkaline Phosphatase  85 U/L      

 

 Alt (SGPT)  18 U/L    14-54  

 

 Ast (Sgot)  19 U/L    12-42  

 

 eGFR Non-  73.4    > 60  

 

 eGFR   94.4    > 60  35









 Laboratory test finding  2012  Magnesium  1.9 mg/dL    1.7-2.6  

 

 CBC Auto Diff  2012  White Blood Count  8.9 CUMM    4.8-10.8  36









 Red Cell Count  4.61 CUMM    4.2-5.4  36

 

 Hemoglobin  13.9 g/dL    12.0-16.0  36

 

 Hematocrit  40 %    35-47  36

 

 Mean Corpuscular Volume  87 um3    79-97  36

 

 Mean Corpuscular Hemoglob  30 pg    27-31  36

 

 Mean Corpuscular HGB Cone  35 g/dL    32-36  36

 

 Redcell Distribution WDTH  13 %    10.5-15  36

 

 Platelet Count  246 CUMM    150-450  36

 

 Mean Platelet Volume  8.8 um3    7.4-10.4  36

 

 Gran %  71.0 %    38-83  36

 

 Lymph %  20.8 %  Low  25-47  36

 

 Mononuclear %  5.7 %    1-9  36

 

 Eosinophil %  2.2 %    0-6  36

 

 Basophil %  0.3 %    0-2  36

 

 Abs Lymphs  1.8    1.0-4.8  36

 

 Abs Mononuclear  0.5    0-0.8  36

 

 Absolute Neutrophil Count  6.3    1.5-7.7  36

 

 Abs Eosinophils  0.2    0-0.6  36

 

 Abs Basophils  0    0-0.2  36









 Type And Screen  2012  Patient Blood Type  A POSITIVE      36









 Antibody Screen  NEGATIVE      36

 

 Specimen Discard Date  12      36, 37









 Basic Metabolic Panel  2012  Sodium  137 mmol/L    135-145  36









 Potassium  4.2 mmol/L    3.5-5.0  36

 

 Chloride  103 mmol/L    101-111  36

 

 Co2 (Carbon Dioxide)  25.0 mmol/L    22-32  36

 

 Anion Gap  9.0 mmol/L    2-11  36, 38

 

 Glucose  235 mg/dL  High    36

 

 BUN  8 mg/dL    6-24  36

 

 Creatinine  0.7 mg/dL    0.50-1.40  36

 

 One Over Creatinine  1.42      36

 

 BUN/Creatinine Ratio  11.4    8-20  36

 

 Calcium  8.8 mg/dL    8.1-9.9  36

 

 eGFR Non-  85.6    > 60  36

 

 eGFR   110.1    > 60  36, 39









 Urinalysis W/Microscopic  2012  Ua Color  YELLOW    Yellow  36









 Appearance-Urine  CLEAR    Clear  36

 

 Specific Gravity-Ur  1.021    1.010-1.030  36

 

 Esterase-Urine  1+    Negative  36

 

 Nitrite  NEGATIVE    Negative  36

 

 Urobilinogen-Ur-DIP  NEGATIVE    Negative  36

 

 Protein-Urine  NEGATIVE    Negative  36

 

 PH-Urine  5.0    5-9  36

 

 Blood-Urine  NEGATIVE    Negative  36

 

 Ketones-Urine  NEGATIVE    Negative  36

 

 Bilirubin-Ur  NEGATIVE    Negative  36

 

 Glucose-Urine  NEGATIVE    Negative  36

 

 WBC-Urine  3-5    0-5  36

 

 RBC-Urine  0-2    0-2  36

 

 Epith Cells-Ur  OCCASIONAL    None  36

 

 Bacteria-Urine  TRACE    None  36









 Urine Culture &  2012  M  ---------------- <SEE      36, 40



 Sensitivi      NOTE>      

 

 CBC No Diff  2012  White Blood  10.4 CUMM    4.8-10.8  



     Count        









 Red Cell Count  4.40 CUMM    4.2-5.4  

 

 Hemoglobin  13.5 g/dL    12.0-16.0  

 

 Hematocrit  38 %    35-47  

 

 Mean Corpuscular Volume  87 um3    79-97  

 

 Mean Corpuscular Hemoglob  31 pg    27-31  

 

 Mean Corpuscular HGB Cone  35 g/dL    32-36  

 

 Redcell Distribution WDTH  13 %    10.5-15  

 

 Platelet Count  282 CUMM    150-450  

 

 Mean Platelet Volume  9.5 um3    7.4-10.4  









 Protime  2012  Inr  0.89    0.88-1.13  41









 Protime  10.5 SEC    10.3-13.5  42









 Basic Metabolic Panel  2012  Sodium  135 mmol/L    135-145  









 Potassium  4.2 mmol/L    3.5-5.0  

 

 Chloride  99 mmol/L  Low  101-111  

 

 Co2 (Carbon Dioxide)  29.0 mmol/L    22-32  

 

 Anion Gap  7.0 mmol/L    2-11  43

 

 Glucose  189 mg/dL  High    

 

 BUN  14 mg/dL    6-24  

 

 Creatinine  0.7 mg/dL    0.50-1.40  

 

 One Over Creatinine  1.42      

 

 BUN/Creatinine Ratio  20.0    8-20  

 

 Calcium  9.1 mg/dL    8.1-9.9  

 

 eGFR Non-  85.6    > 60  

 

 eGFR   110.1    > 60  44









 Laboratory test finding  2012  Hemoglobin A1c  8.8 %  High  4.1-5.7  



     ([facility]/CMC,CX)        

 

 Comprehensive Metabolic  2012  Albumin  4.1 g/dL    3.8-5.5  



 Prof            









 Alk. Phos.  92 U/L      

 

 Alt (SGPT)  35 U/L    7-35  

 

 Ast (Sgot)  14 U/L    5-34  

 

 BUN  10 mg/dL    6-26  

 

 Calcium  9.2 mg/dL    8.6-10.2  

 

 Chloride  97 mEq/L      

 

 Creatinine  0.7 mg/dL    0.6-1.4  

 

 Carbon Dioxide  29 mEq/L    21-32  

 

 Glucose  218 mg/dL  High    45

 

 Sodium  138 mEq/L    134-149  

 

 Total Bilirubin  0.6 mg/dL    0.2-1.3  

 

 Total Protein  6.4 g/dL    6.3-8.1  

 

 Potassium  4.4 mEq/L    3.6-5.5  

 

 Globulin  2.3 g/dL    2.0-4.8  

 

 A/G Ratio  1.8 Calc    0.6-2.2  

 

 BUN/Creat Ratio  15.8 Calc    8.0-36.0  









 Lipid Profile  2012  Cholesterol  180 mg/dL    120-200  









 HDL  44 mg/dL    30-85  

 

 Triglycerides  126 mg/dL      

 

 HDL Risk Factor  4.1 CALC  High  0.0-4.0  

 

 LDL (Calculated)  111 CALC    0-129  

 

 VLDL (Calculated)  25 mg/dL    0-50  









 CBC Electronic (a)  2012  WBC  8.5    3.6-9.6  









 RBC  4.33    3.90-5.70  

 

 Hemoglobin ([facility]ma/CMC/CTX)  12.4 g/dL    12.1 - 17.2  

 

 Hematocrit (Fma/CMC/CTX)  37.8 %    36.1 - 50.3  

 

 Platelets  319 10^3/ul    150-400  

 

 Lymph%  31.2    20.5-51.1  

 

 Mixed%  4.1      

 

 Neutrophils %  64.7      

 

 Mean Corpuscular Vol  87    82.2-97.4  

 

 Mean Corpuscular Hemoglobin  28.7    27.6-33.3  

 

 Mean Corpuscular Hemo Concen  32.9    32.0-36.0  

 

 RDW  11.5  Low  11.6-13.7  

 

 Mean Platelet Volume  8.3    6.5-11.0  









 Comprehensive Metabolic Prof  2011  Albumin  4.3 g/dL    3.8-5.5  









 Alk. Phos.  96 U/L      

 

 Alt (SGPT)  22 U/L    7-35  

 

 Ast (Sgot)  12 U/L    5-34  

 

 BUN  10 mg/dL    6-26  

 

 Calcium  9.3 mg/dL    8.6-10.2  

 

 Chloride  99 mEq/L      

 

 Creatinine  0.7 mg/dL    0.6-1.4  

 

 Carbon Dioxide  26 mEq/L    21-32  

 

 Glucose  222 mg/dL  High    46

 

 Sodium  140 mEq/L    134-149  

 

 Total Bilirubin  0.7 mg/dL    0.2-1.3  

 

 Total Protein  6.9 g/dL    6.3-8.1  

 

 Potassium  4.2 mEq/L    3.6-5.5  

 

 Globulin  2.6 g/dL    2.0-4.8  

 

 A/G Ratio  1.7 Calc    0.6-2.2  

 

 BUN/Creat Ratio  13.4 Calc    8.0-36.0  









 Lipid Profile  2011  Cholesterol  265 mg/dL  High  120-200  47









 HDL  52 mg/dL    30-85  

 

 Triglycerides  187 mg/dL      

 

 HDL Risk Factor  5.1 CALC  High  0.0-4.0  

 

 LDL (Calculated)  176 CALC  High  0-129  

 

 VLDL (Calculated)  37 mg/dL    0-50  









 CBC Electronic (a)  2011  WBC  8.0    3.6-9.6  









 RBC  5.02    3.90-5.70  

 

 Hemoglobin (Fma/CMC/CTX)  14.9 g/dL    12.1 - 17.2  

 

 Hematocrit (Fma/CMC/CTX)  44.0 %    36.1 - 50.3  

 

 Platelets  314 10^3/ul    150-400  

 

 Lymph%  27.3    20.5-51.1  

 

 Mixed%  6.6      

 

 Neutrophils %  66.1      

 

 Mean Corpuscular Vol  88    82.2-97.4  

 

 Mean Corpuscular Hemoglobin  29.6    27.6-33.3  

 

 Mean Corpuscular Hemo Concen  33.9    32.0-36.0  

 

 RDW  12.0    11.6-13.7  

 

 Mean Platelet Volume  7.8    6.5-11.0  









 1  SEE RESULT BELOW



   -----------------------------------------------------------------------------
---------------



   Name:  HUE DU                     : 1955    Attend Dr: Araceli Monterroso MD



   Acct:  [id number]  Unit: [id number]  AGE: 63            Location:  [facility]



   Re18                        SEX: F             Status:    DEP ER



   -----------------------------------------------------------------------------
---------------



   



   SPEC: [id number]         TIFFANY:       SUBM DR: Dia CRENSHAW



   REQ:  [id number]              RECD:   



   STATUS: VIVI SOLITARIO DR: Araceli Nguyen MD



   _



   SOURCE: URINE          SPDES:



   ORDERED:  Urine Culture



   



   -----------------------------------------------------------------------------
---------------



   Procedure                         Result                         Reported   
        Site



   -----------------------------------------------------------------------------
---------------



   Urine Culture  Final                                             18-
[id number]      ML



   



   No growth of clinically significant organisms



   



   -----------------------------------------------------------------------------
---------------



   * ML - MAIN LAB ([facility])



   .



   



   



   



   



   



   



   



   



   



   



   



   



   



   



   



   



   



   



   



   



   



   



   



   



   



   ** END OF REPORT **



   



   * ML=Testing performed at Main Lab



   DEPARTMENT OF PATHOLOGY,  [address] [place] [address]



   Phone # [phone]      Fax #[phone]



   Duane Hopkins M.D. Director     ROSELINE # [id number]

 

 2  SEE RESULT BELOW



   -----------------------------------------------------------------------------
---------------



   Name:  HUE DU                     : 1955    Attend Dr: Veto Esquivel MD



   Acct:  [id number]  Unit: [id number]  AGE: 62            Location:  ENDO



   Re17                        SEX: F             Status:    DEP REF



   -----------------------------------------------------------------------------
---------------



   



   SPEC: [id number]             TIFFANY: [id number]      [facility] DR: Veto Esquivel MD



   REQ:  [id number]             RECD: 



   STATUS: ALYSON SOLITARIO DR: Alyson Nguyen MD



   _



   ORDERED:  LEVEL 4/3



   



   FINAL DIAGNOSIS



   



   



   1.   Colon, distal sigmoid, biopsy:



   -- Hyperplastic polyp.



   



   2.   Colon, ileocecal valve, biopsy:



   -- Tubular adenoma.



   -- No high grade dysplasia or malignancy.



   



   3.   Colon, descending, biopsy:



   -- Tubular adenoma.



   -- No high grade dysplasia or malignancy.



   



   



   



   CLINICAL HISTORY



   



   Follow up after polyp, diarrhea three to four times no blood noted.



   



   POST-OPERATIVE DIAGNOSIS



   



   Colonoscopy to cecum with ease. Conclusions/Plan: Three polyps follow up 
after pathology,



   five year follow up.



   



   GROSS DESCRIPTION



   



   1.   The specimen is received in formalin labeled, Distal Sigmoid Polyp, and 
consists of a



   0.6 x 0.4 by up to 0.3 cm thick of tan-pink irregular to polypoid soft 
tissue fragment,



   which is entirely submitted in one cassette.



   



   2.   The specimen is received in formalin labeled, Biopsy Ileocecal Polyp, 
and consists of a



   0.6 x 0.4 by up to 0.3 cm tan-pink irregular to polypoid soft tissue fragment
, which is



   entirely submitted in one cassette.



   



   3.   The specimen is received in formalin labeled, Biopsy Descending Colon 
Polyp, and



   consists of a 0.5 x 0.4 x 0.3 centimeters tan-pink irregular to polypoid 
soft tissue



   



   ** CONTINUED ON NEXT PAGE **



   



   * ML=Testing performed at Main Lab



   DEPARTMENT OF PATHOLOGY,  [address] [place] [address]



   Phone # [phone]      Fax #[phone]



   Duane Hopkins M.D. Director     [facility] # [id number]



   



   



   



   RUN DATE: 17               [facility] **LIVE**         
       PAGE    2



   



   -----------------------------------------------------------------------------
---------------



   Patient: ROMÁNHUE                      [id number]     (Continued)



   -----------------------------------------------------------------------------
---------------



   



   GROSS DESCRIPTION          (Continued)



   



   GROSS DESCRIPTION          (Continued)



   



   fragment, which is entirely submitted in one cassette.



   



   Signed __________(signature on file)___________ Dia Angela MD  1246



   



   -----------------------------------------------------------------------------
---------------



   



   



   



   



   



   



   



   



   



   



   



   



   



   



   



   



   



   



   



   



   



   



   



   



   



   



   



   



   



   



   



   



   



   



   



   



   ** END OF REPORT **



   



   * ML=Testing performed at Main Lab



   DEPARTMENT OF PATHOLOGY,  [address] [place] [address]



   Phone # [phone]      Fax #[phone]



   Duane Hopkins M.D. Director     [facility] # [id number]

 

 3  Therapeutic target for the treatment of diabetes



   Mellitus patients is <7% HBA1C, and in selective



   patients <6.0%.Please refer to American Diabetes



   Association Diabetic care guidelines for further



   information.

 

 4  SEE RESULT BELOW



   -----------------------------------------------------------------------------
---------------



   Name:  HUE DU                     : 1955    Attend Dr: Atruro Laguna MD



   Acct:  [id number]  Unit: [id number]  AGE: 62            Location[address]  [address]



   Re17                        SEX: F             Status:    ADM Fercho



   -----------------------------------------------------------------------------
---------------



   



   SPEC: [id number]         TIFFANY:   17-    RICH DR: Blayne Huynh MD



   REQ:  [id number]              RECD:   



   STATUS: VIVI SOLITARIO DR: Alyson Nguyen MD



   _



   SOURCE: URINE          SPDESC:



   ORDERED:  Urine Culture



   



   -----------------------------------------------------------------------------
---------------



   Procedure                         Result                         Reported   
        Site



   -----------------------------------------------------------------------------
---------------



   Urine Culture  Final                                             17-
1230      ML



   



   No growth of clinically significant organisms



   



   -----------------------------------------------------------------------------
---------------



   * ML - MAIN LAB ([facility])



   .



   



   



   



   



   



   



   



   



   



   



   



   



   



   



   



   



   



   



   



   



   



   



   



   



   



   



   ** END OF REPORT **



   



   * ML=Testing performed at Main Lab



   DEPARTMENT OF PATHOLOGY,  [address] [place] [address]



   Phone # [phone]      Fax #[phone]



   Duane Hopkins M.D. Director     [facility] # [id number]

 

 5  [facility] Severe Sepsis and Septic Shock Management Bundle Measure



   requires all lactic acids initially measuring >2.0 mmol/L be



   repeated.

 

 6  Specimen hemolyzed. Result may not be valid.

 

 7  Specimen hemolyzed. Result may not be valid.



   Acute inflammation:  >10.00

 

 8  Specimen hemolyzed. Result may not be valid.

 

 9  Specimen hemolyzed. Result may not be valid.

 

 10  Specimen hemolyzed. Result may not be valid.

 

 11  Specimen hemolyzed. Result may not be valid.

 

 12  Specimen hemolyzed. Result may not be valid.

 

 13  Specimen hemolyzed. Result may not be valid.

 

 14  Specimen hemolyzed. Result may not be valid.

 

 15  Specimen hemolyzed. Result may not be valid.

 

 16  Specimen hemolyzed. Result may not be valid.

 

 17  *******Because ethnic data is not always readily available,



   this report includes an eGFR for both -Americans and



   non- Americans.****



   The National Kidney Disease Education Program (NKDEP) does



   not endorse the use of the MDRD equation for patients that



   are not between the ages of 18 and 70, are pregnant, have



   extremes of body size, muscle mass, or nutritional status,



   or are non- or non-.



   According to the National Kidney Foundation, irrespective of



   diagnosis, the stage of the disease is based on the level of



   kidney function:



   Stage Description                      GFR(mL/min/1.73 m(2))



   1     Kidney damage with normal or decreased GFR       90



   2     Kidney damage with mild decrease in GFR          60-89



   3     Moderate decrease in GFR                         30-59



   4     Severe decrease in GFR                           15-29



   5     Kidney failure                       <15 (or dialysis)

 

 18  Unable to report test result due to hemolysis.

 

 19  Unable to report test result due to hemolysis.

 

 20  SEE RESULT BELOW



   -----------------------------------------------------------------------------
---------------



   Name:  HUE DU                     : 1955    Attend Dr: Alyson Nguyen MD



   Acct:  [id number]  Unit: [id number]  AGE: 61            Location:  [facility]



   Re17                        SEX: F             Status:    REG REF



   -----------------------------------------------------------------------------
---------------



   



   SPEC: [id number]         TIFFANY:       SUBM DR: Alyson Nguyen MD



   REQ:  [id number]              RECD:   [id number]



   STATUS: COMP



   _



   SOURCE: STOOL          SPDESC:



   ORDERED:  Stool Culture, Fecal Lactoferr, O P (Full)



   COMMENTS: DO O P FULL REGARDLESS



   



   Unable to perform Shiga Toxin testing.  Specimen collection



   requirements were not met.  Stool for Shiga Toxin testing



   must be received by the laboratory within 2 hours of



   collection or placed in Clifford-Jamari transport medium.



   



   *please interpret stool culture result with caution*



   Stool specimen was not placed into appropriate transport



   medium within recommended time-frame.  Testing may be less



   Sensitive.



   



   -----------------------------------------------------------------------------
---------------



   Procedure                         Result                         Reported   
        Site



   -----------------------------------------------------------------------------
---------------



   Stool Culture  Final                                             17-
1347      ML



   



   Result                      No enteric pathogens isolated



   



   Testing for Salmonella, Shigella, Aeromonas, Plesiomonas,



   Yersinia and Campylobacter are included in a Stool Culture.



   



   Vibrio spp not routinely tested for in a stool culture.



   If testing is desired, please request specifically when



   placing test order.



   



   Sensitivities not routinely performed on stool isolates, as



   antibiotics may prolong the carriage rate of bacteria.



   Please contact the microbiology lab if sensitivities are



   required.



   



   Stool Specimen Description  Final                                17-
1745      ML



   Stool Color                 Brown



   Stool Form                  Semi-formed



   



   ** CONTINUED ON NEXT PAGE **



   



   * ML=Testing performed at [facility]



   DEPARTMENT OF PATHOLOGY,  [address] [place] [address]



   Phone # [phone]      Fax #[phone]



   Duane Hopkins M.D. Director     [facility] # [id number]



   



   



   



   -----------------------------------------------------------------------------
---------------



   Patient: HUE DU DESIRE                      [id number]     (Continued)



   -----------------------------------------------------------------------------
---------------



   



   



   Specimen: [id number]    Collected: 17-  Received: 17-
    (Continued)



   



   -----------------------------------------------------------------------------
---------------



   Procedure                         Result                         Reported   
        Site



   -----------------------------------------------------------------------------
---------------



   Stool Specimen Description  Final   (continued)                  17-
174



   Stool Consistency           Soft



   



   Shiga Toxin 1   2  Final                                         02/15/17-
1417      ML



   Test not performed



   



   Fecal Lactoferrin (Stool WBC)  Final                             17-
1828      ML



   Fecal Lactoferrin           Negative by Immunoassay



   



   TEST LIMITATIONS:



   



   Assay detects elevated levels of lactoferrin released from



   fecal leukocytes as a marker of intestinal inflammation. The



   test may not be appropriate in immunocompromised persons.



   Fecal samples from breast fed infants should not be used



   with this assay.



   



   O P: Giardia/Cryptospor Screen  Final                            02/15/17-
1022      ML



   



   Organism 1                     Neg Cryptosporidium/Giardia



   



   Giardia and cryptosporidium antigen testing performed by



   enzyme immunoassay.  The use of colonic washes, aspirates or



   other diluted sample types has not been established and



   could affect the performance of the assay. Stool samples



   contaminated with an oily or particulate base (eg. Barium,



   mineral oil etc.) could interfere with the test and are not



   recommended.



   



   Ova   Parasite Concen Full  Final                                02/15/17-
0919      ML



   Test not performed



   



   -----------------------------------------------------------------------------
---------------



   * ML - MAIN LAB ([facility])



   .



   



   



   



   



   ** END OF REPORT **



   



   * ML=Testing performed at Main Lab



   DEPARTMENT OF PATHOLOGY,  [address] [place] [address]



   Phone # [phone]      Fax #[phone]



   Duane Hopkins M.D. Director     [facility] # [id number]

 

 21  SEE RESULT BELOW



   -----------------------------------------------------------------------------
---------------



   Name:  HUE DU                     : 1955    Attend Dr: Alyson Nguyen MD



   Acct:  [id number]  Unit: [id number]  AGE: 61            Location:  [facility]



   Re17                        SEX: F             Status:    REG REF



   -----------------------------------------------------------------------------
---------------



   



   SPEC: [id number]         TIFFANY:   1350    [facility] DR: Alyson Nguyen MD



   REQ:  [id number]              RECD:   



   STATUS: RES



   _



   SOURCE: STOOL          SPDESC:



   ORDERED:  Stool Culture, O P (Full)



   



   -----------------------------------------------------------------------------
---------------



   Procedure                         Result                         Reported   
        Site



   -----------------------------------------------------------------------------
---------------



   Stool Culture



   PENDING



   



   Shiga Toxin 1   2



   PENDING



   



   O P: Giardia/Cryptospor Screen  Final                            17-
1208      ML



   



   Organism 1                     Neg Cryptosporidium/Giardia



   



   Giardia and cryptosporidium antigen testing performed by



   enzyme immunoassay.  The use of colonic washes, aspirates or



   other diluted sample types has not been established and



   could affect the performance of the assay. Stool samples



   contaminated with an oily or particulate base (eg. Barium,



   mineral oil etc.) could interfere with the test and are not



   recommended.



   



   Ova   Parasite Concen Full  Final                                17-
1059      ML



   Test not performed



   



   -----------------------------------------------------------------------------
---------------



   * ML - [facility] ([facility])



   .



   



   



   



   



   



   



   



   



   ** END OF REPORT **



   



   * ML=Testing performed at Main Lab



   DEPARTMENT OF PATHOLOGY,  [address] [place] [address]



   Phone # [phone]      Fax #[phone]



   Duane Hopkins M.D. Director     [facility] # [id number]

 

 22  SOURCE: STOOL



   PARASITIC EXAMINATION                                  FINAL



   No parasites seen.



   Cryptosporidium, Cyclospora, and microsporidia are not



   readily detected by this method.



   Single negative specimen does not rule out



   parasitic infection.



   Test Performed by:



   [address] [address]



   : Rishabh Conteh II, M.D., Ph.D.

 

 23  consistent w/ previous results

 

 24  consistent w/ previous results

 

 25  SEE RESULT BELOW



   -----------------------------------------------------------------------------
---------------



   Name:  HUE DU                     : 1955    Attend Dr: Veto Esquivel MD



   Acct:  [id number]  Unit: [id number]  AGE: 60            Location:  [facility]O



   Re/15/15                        SEX: F             Status:    REG REF



   -----------------------------------------------------------------------------
---------------



   



   SPEC: [id number]         TIFFANY:   09/15/    SUBM DR: Veto Esquivel MD



   REQ:  [id number]              RECD:   09/15/



   STATUS: COMP             [facility] DR: Alyson Nguyen MD



   _



   SOURCE: GAS ANTRUM     [facility]:



   ORDERED:  Clotest



   



   -----------------------------------------------------------------------------
---------------



   Procedure                         Result                         Verified   
        Site



   -----------------------------------------------------------------------------
---------------



   Clotest  Final                                                   09/16/15-
0752      ML



   Clotest                     Negative



   



   -----------------------------------------------------------------------------
---------------



   * ML - [facility] LAB ([facility])



   .



   



   



   



   



   



   



   



   



   



   



   



   



   



   



   



   



   



   



   



   



   



   



   



   



   



   



   



   ** END OF REPORT **



   



   * ML=Testing performed at Main Lab



   DEPARTMENT OF PATHOLOGY,  [address] [place] [address]



   Phone # [phone]      Fax #[phone]



   Duane Hpokins M.D. Director     [facility] # [id number]

 

 26  SEE RESULT BELOW



   -----------------------------------------------------------------------------
---------------



   Name:  HUE DU                     : 1955    Attend Dr: Veto Esquivel MD



   Acct:  [id number]  Unit: [id number]  AGE: 60            Location:  ENDO



   Re/15/15                        SEX: F             Status:    REG REF



   -----------------------------------------------------------------------------
---------------



   



   SPEC: [id number]             TIFFANY: 09/15/      SUBM DR: Veto Esquivel MD



   REQ:  [id number]             RECD: 09/15/



   STATUS: ALYSON SOLITARIO DR: Alyson Nguyen MD



   _



   ORDERED:  LEVEL IV/2



   



   FINAL DIAGNOSIS



   



   



   1.   Stomach, antrum, biopsy:



   -- Body-type gastric mucosa with minimal superficial chronic inflammation.



   



   2.   Gastroesophageal junction, biopsy:



   -- Columnar-type mucosa with chronic inflammation and reactive epithelial 
change.



   -- Negative for intestinal metaplasia and dysplasia.



   



   



   



   



   CLINICAL HISTORY



   



   Black stool



   



   POST-OPERATIVE DIAGNOSIS



   



   Larynx - symmetric; esophagus - normal; EG at 40 irregular, biopsied x2; 
stomach - antral



   erosions - black spot at 2 o'clock, CLOtest and biopsy; duodenum - erythema 
in bulb, 2nd



   through 4th normal. Gastroduodenitis



   



   GROSS DESCRIPTION



   



   1.   The specimen is received in formalin labeled, Gastric Antral Biopsies, 
and consists of



   two tan irregular soft tissue fragments averaging 0.3 x 0.2 x 0.1 cm, which 
are submitted



   entirely in one cassette.



   



   2.   The specimen is received in formalin labeled, EG Junction Biopsy, and 
consists of two



   tan irregular soft tissue fragments measuring 0.3 x 0.3 x 0.2 cm and 0.5 x 
0.3 x 0.1 cm,



   which are submitted entirely in one cassette.



   



   Signed __________(signature on file)___________ Dia Angela MD 09/
17/15 1248



   



   -----------------------------------------------------------------------------
---------------



   



   ** END OF REPORT **



   



   * ML=Testing performed at Main Lab



   DEPARTMENT OF PATHOLOGY,  [address] [place] [address]



   Phone # [phone]      Fax #[phone]



   Duane Hopkins M.D. Director     [facility] # [id number]

 

 27  SEE RESULT BELOW



   -----------------------------------------------------------------------------
---------------



   Name:  HUE DU                     : 1955    Attend Dr: Veto Esquivel MD



   Acct:  [id number]  Unit: [id number]  AGE: 60            Location:  [facility]



   Re/15/15                        SEX: F             Status:    REG REF



   -----------------------------------------------------------------------------
---------------



   



   SPEC: [id number]         TIFFANY:   09/14/    SUBM DR: Veto Esquivel MD



   REQ:  [id number]              RECD:   09/15/



   STATUS: VIVI SOLITARIO DR: Alyson Nguyen MD



   _



   SOURCE: STOOL          SPDESC:



   ORDERED:  Hemoccult



   



   -----------------------------------------------------------------------------
---------------



   Procedure                         Result                         Verified   
        Site



   -----------------------------------------------------------------------------
---------------



   Stool Occult Blood  Final                                        09/15/15-
1445      ML



   Stool Occult Blood          Negative



   



   -----------------------------------------------------------------------------
---------------



   * ML - MAIN LAB ([facility])



   .



   



   



   



   



   



   



   



   



   



   



   



   



   



   



   



   



   



   



   



   



   



   



   



   



   



   



   



   ** END OF REPORT **



   



   * ML=Testing performed at Main Lab



   DEPARTMENT OF PATHOLOGY,  [address] [place] [address]



   Phone # [phone]      Fax #[phone]



   Duane Hopkins M.D. Director     [facility] # [id number]

 

 28  consistent w/ previous results

 

 29  RUN DATE: 14               [facility] **LIVE**       
         PAGE    1



   RUN TIME: 1378             [address] [place] [address]   [address]



   Specimen Inquiry



   



   -----------------------------------------------------------------------------
---------------



   Name:  HUE DU                     : 1955    Attend Dr: Veto Esquivel MD



   Acct:  [id number]  Unit: [id number]  AGE: 59            Location:  [facility]



   Re14                        SEX: F             Status:    REG REF



   -----------------------------------------------------------------------------
---------------



   



   SPEC: [id number]             TIFFANY: 14-          SUBM DR: Veto Esquivel MD



   REQ:  [id number]             RECD: 



   STATUS: ALYSON SOLITARIO DR: Alyson Nguyen MD



   _



   ORDERED:  LEVEL IV



   



   FINAL DIAGNOSIS



   



   Colon, ileocecal valve, biopsy:



   Hyperplastic polyp.



   



   



   



   



   



   



   CLINICAL HISTORY



   



   Screening colonoscopy; personal history of polyps. Usual bowel habits - 
every day, 5-6 days



   per week.



   



   POST-OPERATIVE DIAGNOSIS



   



   Screening colonoscopy to cecum with ease; 1 colon polyp, sessile. Follow up 
- 3 years.



   



   GROSS DESCRIPTION



   



   The specimen is received in formalin labeled Hue DESIREKrysten Juanofelia, Ileal Cecal Polyp 
and consists of



   two tan-pink irregular to polypoid soft tissue fragments measuring 0.4 x 0.2 
x 0.2 cm. and



   0.9 x 0.5 x 0.5 cm. The larger tissue is inked, bisected, and the specimen 
is submitted



   entirely in one cassette.



   



   Signed __________(signature on file)___________ Dia Angela MD  1312



   



   -----------------------------------------------------------------------------
---------------



   



   



   



   



   



   



   



   



   ** END OF REPORT **



   



   * ML=Testing performed at Main Lab



   DEPARTMENT OF PATHOLOGY,  [address] [place] [address]



   Phone # [phone]      Fax #[phone]



   Duane Hopkins M.D. Director     [facility] # [id number]

 

 30  RUN DATE: 05/15/13               [facility] **LIVE**       
         PAGE    1



   RUN TIME: 1502             [address] [place] [address]   [address]



   Specimen Inquiry



   



   -----------------------------------------------------------------------------
---------------



   Name:  HUE DU DESIRE                     : 1955    Attend Dr: Veto Esquivel MD



   Acct:  [id number]  Unit: [id number]  AGE: 58            Location:  [facility]



   Re13                        SEX: F             Status:    REG REF



   -----------------------------------------------------------------------------
---------------



   



   SPEC: [id number]             TIFFANY: 13-          [facility] DR: Veto Esquivel MD



   REQ:  [id number]             RECD: 



   STATUS: ALYSON SOLITARIO DR: Alyson Nguyen MD



   _



   ORDERED:  LEVEL IV/4



   



   FINAL DIAGNOSIS



   



   



   1. Colon, ileocecal polyp, biopsy:



   A.   Tubular adenoma.



   B.   No high grade dysplasia or malignancy.



   



   2. Colon, hepatic flexure, biopsy:



   Inflamed hyperplastic polyp with focal ulceration.



   



   3. Rectosigmoid polyp at 17 cm., biopsy:



   Inflamed hyperplastic polyp with focal ulceration.



   



   4. Colon, rectosigmoid biopsy:



   A.   Tubular adenoma.



   B.   No high grade dysplasia or malignancy.



   



   



   



   



   CLINICAL HISTORY



   



   Usual bowel habit - every day with no blood. Past surgical history - 
gallbladder, .



   Colorectal cancer - paternal aunt, uncle, paternal grandfather. Personal 
history of polyps



   



   POST-OPERATIVE DIAGNOSIS



   



   Four colon polyps



   



   GROSS DESCRIPTION



   



   1) The specimen is received in formalin labeled Hue Du, Ileocecal 
Polyp Biopsy and



   consists of multiple, tan, soft tissue fragments measuring 0.7 x 0.3 x 0.1 
cm.  Submitted



   entirely, one cassette.



   



   2) The specimen is received in formalin labeled Hue Du, Hepatic 
Flexure Biopsy and



   consists of a tan, soft tissue fragment measuring 0.4 x 0.3 x 0.3 cm.  
Submitted entirely,



   



   ** CONTINUED ON NEXT PAGE **



   



   * ML=Testing performed at Main Lab



   DEPARTMENT OF PATHOLOGY,  [facility] "[employer]" [place], [place] [address]



   Phone # [phone]      Fax #[phone]



   Duane Hopkins M.D. Director     [place] Permit  #[id number]



   



   



   



   RUN DATE: 05/15/13               [facility] LAB **LIVE**         
       PAGE    2



   RUN TIME: 1502             [facility] [place], [place]   [address]



   Specimen Inquiry



   



   -----------------------------------------------------------------------------
---------------



   Patient: HUE DU                      [id number]     (Continued)



   -----------------------------------------------------------------------------
---------------



   



   GROSS DESCRIPTION          (Continued)



   



   GROSS DESCRIPTION          (Continued)



   



   one cassette.



   



   3) The specimen is received in formalin labeled Hue Du, Rectosigmoid 
Polyp at 17 cm.



   and consists of multiple, tan-pink, polypoid fragment measuring 0.7 x 0.7 x 
0.4 cm.



   Submitted entirely, one cassette.



   



   4) The specimen is received in formalin labeled Hue Du, Rectosigmoid 
Polyp at 11 cm.



   and consists of a polypoid mass measuring 0.6 x 0.5 x 0.3 cm.  Submitted 
entirely, one



   cassette.



   



   1)



   



   Signed __________(signature on file)___________ Duane Hopkins MD 05/15/
13 1502



   



   -----------------------------------------------------------------------------
---------------



   



   



   



   



   



   



   



   



   



   



   



   



   



   



   



   



   



   



   



   



   



   



   



   



   



   



   ** END OF REPORT **



   



   * ML=Testing performed at Main Lab



   DEPARTMENT OF PATHOLOGY,  [address] [place] [address]



   Phone # [phone]      Fax #[phone]



   Duane Hopkins M.D. Director     [place] Permit  #[id number]

 

 31  ---------------------------------------------------------------------------
----



   -------------



   



   RUN DATE: 08/15/12               [facility] NMI **LIVE**



   PAGE 1



   RUN TIME: 1455                            Specimen Inquiry



   RUN USER: INTERFACE



   -----------------------------------------------------------------------------
--



   -------------



   



   Name: HUE DU                     Acct#: [id number]      Status: REG REF



   Re12



   Age/Sex: 57/F                         Unit#: [id number]       [facility]



   -----------------------------------------------------------------------------
--



   -------------



   



   



   Specimen: [id number]    ALYSON  Spec Date:    [facility] Dr: Alyson ly MD



   Spec Type: SURGICAL P         Received:     Copies to:



   



   



   SPECIMEN



   



   1) LEFT BACK  2) MID BACK  3) RIGHT BREAST  4) RIGHT AXILLA



   



   HISTORY



   



   CLINICAL INFORMATION:    No history given



   



   



   



   GROSS DESCRIPTION



   



   1) The specimen is received in formalin labelled [facility], and consists



   of a fragment of white tissue measuring 0.8 x 0.8 x 0.4 cm.  Submitted



   entirely, one cassette labelled 1.



   



   2) The specimen is received in formalin labelled Hue Tudi, Mid Back, and



   consists of a fragment of white tissue measuring 0.8 x 0.8 x 0.4 cm.



   Submitted entirely, one cassette labelled 2.



   



   3) The specimen is received in formalin labelled Hue Tudi, Right Breast,



   and consists of a fragment of white tissue measuring 0.5 x 0.5 x 0.4 cm.



   Submitted entirely, one cassette labelled 3.



   



   4) The specimen is received in formalin labelled Hue di, Right Axilla,



   and consists of one fragment of brown tissue measuring 0.5 x 0.5 x 0.4 cm.



   Submitted entirely, one cassette labelled 4.



   



   ******DIAGNOSIS******



   



   1) Skin, left back, excision:



   Acrochordon.



   



   2) Skin, mid back, excision:



   Acrochordon.



   



   3) Skin, right breast, excision:



   Intradermal melanocytic nevus with prominent neurotized features.



   



   4) Skin, right axilla, excision:



   Intradermal melanocytic nevus with prominent neurotized features.



   



   Signed Electronically by: WILBER MOREAU 08/15/12 [id number]



   -----------------------------------------------------------------------------
--



   -------------



   



   



   DEPARTMENT OF PATHOLOGY, [address] [place] [address]



   Phone #[phone]   Fax #[phone]   [facility] Permit #[id number]



   010



   Duane Hopkins M.D. Director   Wilber Moreau M.D. Assistant Dir



   remy



   -----------------------------------------------------------------------------
--



   -------------

 

 32  RESULT TAMIR'D

 

 33  Anion gap measurement may be of limited value in the



   presence of any alkalosis, especially in a combined acid



   base disorder.



   .

 

 34  A metabolite of Naproxen, O-desmethylnaproxen, has been



   shown to interfere with the [place] method for



   measuring total bilirubin.  Samples from patients who have



   taken Naproxen have shown spurious elevation in total



   bilirubin levels.

 

 35  *******Because ethnic data is not always readily available,



   this report includes an eGFR for both -Americans and



   non- Americans.****



   The National Kidney Disease Education Program (NKDEP) does



   not endorse the use of the MDRD equation for patients that



   are not between the ages of 18 and 70, are pregnant, have



   extremes of body size, muscle mass, or nutritional status,



   or are non- or non-.



   According to the National Kidney Foundation, irrespective of



   diagnosis, the stage of the disease is based on the level of



   kidney function:



   Stage Description                      GFR(mL/min/1.73 m(2))



   1     Kidney damage with normal or decreased GFR       90



   2     Kidney damage with mild decrease in GFR          60-89



   3     Moderate decrease in GFR                         30-59



   4     Severe decrease in GFR                           15-29



   5     Kidney failure                       <15 (or dialysis)

 

 36  AA 12

 

 37  PREADMISSION TESTING SAMPLES FOR BLOOD BANK WILL BE HELD FOR



   14 DAYS FROM THE DATE OF COLLECTION *IF* THE FOLLOWING



   CRITERIA ARE MET:



   



   1) THE PATIENT HAS *NOT* BEEN PREGNANT IN THE LAST 3 MONTHS.



   2) THE PATIENT HAS *NOT* BEEN TRANSFUSED IN THE LAST 3



   MONTHS.



   ============================================================



   PREADMISSION TESTING SAMPLES WILL *NOT* BE HELD FOR 14 DAYS



   FROM PATIENTS WHO IN THE LAST 3 MONTHS:



   



   1) HAVE BEEN PREGNANT



   2) HAVE BEEN TRANSFUSED



   



   THESE PATIENTS *MUST* BE COLLECTED WITHIN 3 DAYS OF THE



   SURGERY DATE.

 

 38  Anion gap measurement may be of limited value in the



   presence of any alkalosis, especially in a combined acid



   base disorder.



   .

 

 39  *******Because ethnic data is not always readily available,



   this report includes an eGFR for both -Americans and



   non- Americans.****



   The National Kidney Disease Education Program (NKDEP) does



   not endorse the use of the MDRD equation for patients that



   are not between the ages of 18 and 70, are pregnant, have



   extremes of body size, muscle mass, or nutritional status,



   or are non- or non-.



   According to the National Kidney Foundation, irrespective of



   diagnosis, the stage of the disease is based on the level of



   kidney function:



   Stage Description                      GFR(mL/min/1.73 m(2))



   1     Kidney damage with normal or decreased GFR       90



   2     Kidney damage with mild decrease in GFR          60-89



   3     Moderate decrease in GFR                         30-59



   4     Severe decrease in GFR                           15-29



   5     Kidney failure                       <15 (or dialysis)

 

 40  ---------------------------------------------------------------------------
-----------------



   RUN DATE: 12               [facility] NMI **LIVE**         
       PAGE 1



   RUN TIME: 903                            Specimen Inquiry



   RUN USER: INTERFACE



   -----------------------------------------------------------------------------
---------------



   Name: HUE DU                     Acct#: [id number]      Status: REG REF  
   Re12



   Age/Sex: 57/F                         Unit#: [id number]       Location: [facility] : 51



   -----------------------------------------------------------------------------
---------------



   



   SPEC #: [id number]      TIFFANY:      STATUS:  COMP           
REQ #: [id number]



   RECD:      [facility] DR: Trenton James MD



   SOURCE: URINE              ENTR:      [facility] DR: Wendy SPARKS,
Alyson DÍAZ



   SPDESC:



   ORDERED:  URINE C   S



   QUERIES:  SPECIMEN DESCRIPTION: URINE, RANDOM



   ACT WKST: UR 12 #1



   -----------------------------------------------------------------------------
---------------



   Procedure                         Result                         Verified   
        Site



   -----------------------------------------------------------------------------
---------------



   > URINE CULTURE   SENSITIVI  Final                                 12-
903       ML



   SCANT NORMAL URETHRAL OR PERINEAL LEVI



   



   -----------------------------------------------------------------------------
---------------



   ML - [facility] Permit #[id number]



   [address] [place] [address]        Ph: [phone]  Fax: [phone]



   



   



   



   



   



   



   



   



   



   



   



   



   



   



   



   



   



   



   



   



   



   



   



   



   



   



   



   



   



   -----------------------------------------------------------------------------
---------------



   DEPARTMENT OF PATHOLOGY, [address] [place] [address]



   Phone #[phone]   Fax #[phone]   [facility] Permit #[id number]



   ELIZABETH Rogers M.D. 



   -----------------------------------------------------------------------------
---------------

 

 41  Recommended INR for Patients



   on Oral Anticoagulants



   Prophylaxis                       2.0 - 3.0



   Treatment of thrombosis           2.0 - 3.0



   Prevention of embolism            2.0 - 3.0



   Prevention of embolism from



   prosthetic heart valves         2.5 - 3.5

 

 42  DIAGNOSIS,TREATMENT,AND THERAPY MUST BE BASED ON THE INR



   VALUE ALONE.

 

 43  Anion gap measurement may be of limited value in the



   presence of any alkalosis, especially in a combined acid



   base disorder.



   .

 

 44  *******Because ethnic data is not always readily available,



   this report includes an eGFR for both -Americans and



   non- Americans.****



   The National Kidney Disease Education Program (NKDEP) does



   not endorse the use of the MDRD equation for patients that



   are not between the ages of 18 and 70, are pregnant, have



   extremes of body size, muscle mass, or nutritional status,



   or are non- or non-.



   According to the National Kidney Foundation, irrespective of



   diagnosis, the stage of the disease is based on the level of



   kidney function:



   Stage Description                      GFR(mL/min/1.73 m(2))



   1     Kidney damage with normal or decreased GFR       90



   2     Kidney damage with mild decrease in GFR          60-89



   3     Moderate decrease in GFR                         30-59



   4     Severe decrease in GFR                           15-29



   5     Kidney failure                       <15 (or dialysis)

 

 45  result reckd'

 

 46  RESULT TAMIR'D

 

 47  RESULT TAMIR'D







Procedures







 Date  CPT Code  Description  Status  Comment

 

 2018  41994  Electrocardiogram Complete  Completed  

 

 2018  27504  Remove Skin Tags Up To 15  Completed  

 

 2017  99632  Finger Or Heel Stick  Completed  

 

 2017    Colonoscopy  Completed  

 

 2017    Diabetic Retinal Eye Exam  Completed  Dr Min  No Diabetes



         retinopathy

 

 2016  32917  Finger Or Heel Stick  Completed  

 

 2016  27188  Finger Or Heel Stick  Completed  

 

 2016  56237  Pulse Oximetry  Completed  

 

 2015  19390  Finger Or Heel Stick  Completed  

 

 2015  49475  Finger Or Heel Stick  Completed  

 

 2014  15565  Finger Or Heel Stick  Completed  

 

 2014    Colonoscopy  Completed  

 

 2013    Colonoscopy  Completed  

 

 2013    Mammogram  Completed  

 

 2013  83941  Excise Benign Lesion 1.1-2CM  Completed  



     Trunk/Arm/Leg    

 

 2013  18943  Biopsy Skin Lesion Single  Completed  

 

 04/15/2013    Mammogram  Completed  can't  take time off



         from work.

 

 04/15/2013  45102  Finger Or Heel Stick  Completed  

 

 2013  43939  Finger Or Heel Stick  Completed  

 

 08/10/2012  26895  Shave Skin Lesion   <.6CM  Completed  



     Trunk/Arm/Leg    

 

 08/10/2012  31152  Remove Skin Tags Up To 15  Completed  

 

 08/10/2012  85766  Shave Skin Lesion   <.6CM  Completed  



     Trunk/Arm/Leg    

 

 2012  44235  Finger Or Heel Stick  Completed  

 

 2012  62377  Finger Or Heel Stick  Completed  

 

 2012  69931  Finger Or Heel Stick  Completed  

 

 2012  99304  Finger Or Heel Stick  Completed  

 

 2012  04280  Finger Or Heel Stick  Completed  

 

 2012  15387  Finger Or Heel Stick  Completed  

 

 2012  31615  Finger Or Heel Stick  Completed  

 

 2012  69125  Finger Or Heel Stick  Completed  

 

 2012  39152  Finger Or Heel Stick  Completed  

 

 2012  40690  Finger Or Heel Stick  Completed  

 

 2012  03254  Finger Or Heel Stick  Completed  

 

 2012  46182  Electrocardiogram Complete  Completed  

 

 2011  15582  Electrocardiogram Complete  Completed  







Encounters







 Type  Date  Location  Provider  CPT E/M  Dx

 

 Office Visit  2018  9:00a  Main Office  Shahida Eid NP  87294  
R42









 E11.65

 

 I10









 Office Visit  2018  9:00a  Main Office  Shahida Eid NP  46553  
E11.65









 I10









 Office Visit  2018  1:00p  Main Office  Alyson Nguyen M.D.  78503  
Z01.818









 M17.12

 

 E11.65

 

 I10









 Office Visit  2018  3:20p  [facility] Office  Alyson Nguyen M.D.  
44609  K21.9









 L91.8

 

 S90.852A

 

 M25.561









 Office Visit  2017 11:30a  Main Office  Shahida Eid NP  23668  
R04.0

 

 Office Visit  2017  6:40p  Main Office  Alyson Nguyen M.D.  28670  
E11.65









 Z12.31









 Office Visit  2017  6:15p  Main Office  KIRSTEN Cottrell  82951  N39.0

 

 Office Visit  2017  5:40p  Main Office  Alyson Nguyen M.D.  36001  
E11.65









 R19.7

 

 F43.21









 Office Visit  2016 11:30a  [facility]  Dia Do, [facility]  
48259  R05









 R19.7









 Office Visit  2016  3:30p  Main Office  Alyson Nguyen M.D.  63388  
M79.605









 E11.65









 Office Visit  2016  7:20p  Main Office  Alyson Nguyen M.D.  11998  
E11.65









 F43.21

 

 M76.32

 

 K58.0









 Office Visit  2016  6:30p  Main Office  Alyson Nguyen M.D.  93590  
E11.65









 F43.21









 Office Visit  2016  7:30p  Main Office  Ale MackayMari-JACOB  37035  
J18.9

 

 Office Visit  12/10/2015  9:40a  [facility]  Alyson Nguyen M.D.  
89843  E11.65









 M79.641

 

 M79.642

 

 E55.9









 Office Visit  10/12/2015  4:20p  Main Office  Alyson Nguyen M.D.  66806  
E11.65









 M15.0

 

 I10









 Office Visit  2015  9:40a  [facility]  Alyson Nguyen M.D.  
54767  250.02









 578.1









 Office Visit  2015  8:30a  Main Office  Anaid ChungNikunj  00593  
847.1

 

 Office Visit  2015  9:40a  Main Office  Alyson Nguyen M.D.  40426  
250.02









 381.89









 Office Visit  2014  5:40p  Main Office  Alyson Nguyen M.D.  99877  
250.00

 

 Office Visit  10/02/2014 11:30a  [facility]  Alyson Nguyen M.D.  
87096  250.02









 278.00

 

 305.1

 

 780.52









 Office Visit  2014  6:45p  Main Office  Rain Catherine NP  29804  250.02









 305.1

 

 782.9









 Office Visit  2014  6:15p  Main Office  Rain Catherine NP  48641  461.0









 466.0









 Office Visit  2013 10:50a  [facility]  Alyson Nguyen M.D.  
94941  250.02









 715.00

 

 787.91

 

 305.1

 

 v03.82









 Office Visit  2013 11:20a  Main Office  Alyson Nguyen M.D.  74051  
709.2









 V58.32









 Office Visit  2013  2:00p  [facility]  Alyson Nguyen M.D.  
12648  709.2

 

 Office Visit  04/15/2013  5:40p  Main Office  Alyson Nguyen M.D.  36426  
715.00









 250.02

 

 701.4

 

 v06.5









 Office Visit  2013  9:30a  [facility]  Hue Villarreal, [facility]  23340  
250.02









 250.00









 Office Visit  10/10/2012 11:45a  [facility] Office  Hue CherelleAMPARO  55077  461.0

 

 Office Visit  08/10/2012  2:30p  [facility]  Alyson Nguyen M.D.  
75657  782.9









 701.9

 

 216.5









 Office Visit  2012 11:30a  Main Office  Alyson Nguyen M.D.  84955  
715.00









 453.40

 

 250.00

 

 782.9

 

 v58.61









 Office Visit  2012  9:20a  Main Office  Alyson Nguyen M.D.  78958  
250.00









 719.46

 

 453.40

 

 V58.61









 Office Visit  2012  9:00a  [facility]  Alyson Nguyen M.D.  
69710  453.40









 728.85

 

 715.00

 

 v58.61









 Office Visit  2012  5:40p  [facility] Office  Alyson Nguyen M.D.  57553  
719.46









 250.00

 

 401.1

 

 728.85









 Office Visit  2012  6:20p  [facility] Office  Alyson Nguyen M.D.  39610  
250.00

 

 Office Visit  2012 10:00a  [facility]  Alyson Nguyen M.D.  
05735  250.00

 

 Office Visit  2012 11:00a  [facility] Office  Alyson Nguyen M.D.  61339  
715.00









 272.2

 

 790.6

 

 V72.83









 Office Visit  2012  9:10a  [facility] Office  Alyson Nguyen M.D.  22251  
305.1

 

 Office Visit  2011 10:20a  [facility]  Alyson Nguyen M.D.  
27211  719.46









 V72.83







Plan of Care

2018 - Alyson Nguyen M.D.I20.9 Angina pectoris, unspecifiedComments:
exercise stress test with pictures.You are taking a fish oil pill in place of 
the aspirin because you had internal bleeding when taking the aspirin.K21.9 
Gastro-esophageal reflux disease without esophagitisNew Medication:Omeprazole 
20 mgI10 Essential (primary) hypertensionComments:Blood pressure well 
controlled at present   Bring your new cuff with you to the stress test to 
compare with  their cuff.N83.202 Unspecified ovarian cyst, left sideNew Xrays:
Ultrasound Pelvic Nonob CompleteAllComments:~B_~U_Medication Management~b_~u_ 
Patient Understands medications she's taking?     Yes    No  Are there Barriers 
to Adherence?    Yes    No  Has the patient been asked about herbal supplements 
and therapies, and OTC meds?      Yes    No      ~B_~U_Care Plan~b_~u_1.  
Patient has been queried about patient's goals/preferences and functional/
lifestyle goals at relevant visits.  If relevant, describe: na2.  Treatment 
goals as explained to the patient: above3.  Are there barriers to meeting 
treatment goals?  Yes    No      If Yes, please describe:4.  Self-Management 
goals as described to the patient:  Yes    No as above.

## 2018-08-02 NOTE — XMS REPORT
Hue Du

 Created on:2018



Patient:Hue Du

Sex:Female

:1955

External Reference #:[id number]





Demographics







 Address  [address] [id number]

 

 Home Phone  [phone]

 

 Mobile Phone  [phone]

 

 Preferred Language  English

 

 Marital Status  Not  Or 

 

 Restoration Affiliation  Unknown

 

 Race  White

 

 Ethnic Group  Not  Or 









Author







 Organization  [facility]

 

 Address  [address] [place] [address]

 

 Phone  [phone]









Support







 Name  Relationship  Address  Phone

 

 Steven Fofana  Niece/Nephew  [address]  [phone]



     [place] [address]  









Care Team Providers







 Name  Role  Phone

 

 Alyson Nguyen  Care Team Information   Unavailable

 

 Alyson Nguyen  Primary Care Physician  Unavailable









Payers







 Type  Date  Identification Numbers  Payment Provider  Subscriber

 

 [employer]  Effective:  Policy Number: [id number]  Medicare Blue Ppo  Hue Du



   2018      









 Group Number: [id number]  [address] [address]

 

 PayID: [id number]  [place] [address]







Problems







 Date  Description  Provider  Status

 

 Onset: 2011  Arthralgia of the lower leg  Alyson Nguyen M.D.  Active

 

 Onset: 2012  Type 2 diabetes mellitus  Alyson Nguyen M.D.  Active

 

 Onset: 2012  Embolism from thrombosis of vein of  Alyson Nguyen M.D.  Active



   distal lower extremity    

 

 Onset: 2012  Degenerative joint disease involving  Alyson Nguyen M.D.  Active



   multiple joints    

 

 Onset: 2012  Symptom of skin and integumentary  Alyson Nguyen M.D.  
Active



   tissue    

 

 Onset: 04/15/2013  Type II diabetes mellitus  Alyson Nguyen M.D.  Active



   uncontrolled    

 

 Onset: 04/15/2013  Keloid scar  Alyson Nguyen M.D.  Active

 

 Onset: 2013  Tobacco user  Alyson Nguyen M.D.  Active

 

 Onset: 10/12/2015  Type II diabetes mellitus  Alyson Nguyen M.D.  Active



   uncontrolled    







Family History







 Date  Family Member(s)  Problem(s)  Comments

 

   General  Paternal siblings with Colon CANo fam hx  



     MI.stroke,DM, Lung, Breast CA.  

 

   Father   82, old age. Pacemaker.  

 

   Mother   60's MVA.  Had been healthy.  

 

   Number of Children  None  

 

   Number of Siblings  6 siblings.   Sister with heart diseaseNo cancer or  



     diabetes2 uncles and an aunt had colon cancer  







Social History







 Type  Date  Description  Comments

 

 Education    Highest level of education  



     completed is 12th grade  

 

 Marital Status    Patient is   

 

 Living Situation     no children.  

 

 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  

 

 Cigarette Use    Former Cigarette Smoker   QUIT  OCTOBER 3, 2014.



       light smoker for 10



       years.

 

 ETOH Use    Occasional  on Sundays, a couple of



       glasses of wine.

 

 Smoking    Patient is a former smoker  is on chantix.

 

 Daily Caffeine    Consumes on average 3 cups  non dairy creamer.



     of coffee per day  

 

 Exercise Type/Frequency    exercising knee. uses  



 Current    bicycle.  treadmill - up to  



     6 minutes daily.  

 

 Seat Belt/Car Seat    Always uses a seat belt  







Allergies, Adverse Reactions, Alerts







 Date  Description  Reaction  Status  Severity  Comments

 

 2011  Penicillins    active    Flu Like Symptoms

 

 2012  Lisinopril  psychological - got very  active    



     "mean" and argumentative      

 

 08/10/2012  Morphine  GI upset  active    

 

 2018  Aspirin  internal bleeding  active    internal bleeding.







Medications







 Medication  Date  Status  Form  Strength  Qnty  SIG  Indications  Ordering



                 Provider

 

 Blood Pressure  /  Active  Kit    1unit  Take blood  I10  Shahida Bettencourt          s  pressure    Eid,



             every    NP



             morning    



             with feet    



             flat on the    



             floor and    



             as needed    



             for    



             headache,    



             dizziness    

 

 Irbesartan  /  Active  Tablets  150mg  30tab  1 by mouth  E11.65  Alyson Thakur        s  every day    ELIZABETH Nguyen

 

 Ranitidine HCL  /  Active  Tablets  300mg  90tab  take one  K21.9  Alyson Thakur        s  tablet by    gerri Nguyen at    M.D.



             bedtime    

 

 Proair HFA  /  Active  Aerosol  108(90Bas  8.500  2 puffs  R05  Dia



         e)  gm  every 4    Hi,



         mcg/Act    hours as    FNP



             needed    

 

 Hydrocodone-Acet  /  Active  Tablets  7.5-325mg  120ta  1 by mouth  
M79.605  Alyson L.



 aminophen  2016        bs  four times    Wendy,



             daily    M.DKrysten

 

 Lorazepam  /  Active  Tablets  1mg  30tab  take  F43.21  Alyson DÍAZ



   2016        s  one-half or    Wendy,



             one tablet    M.D.



             by mouth at    



             at bedtime    



             for sleep.    

 

 Glipizide  /  Active  Tablets  5mg  60tab  2 by mouth    Alyson DÍAZ



           s  in the    Wendy,



             morning.    M.DKrysten

 

 Freestyle  /  Active  Misc    1Box  test every    Alyson DÍAZ



 Lancets  2015          day dx:    Wendy



             250.00,    M.DKrysten



             last visit    



             3/9/15    

 

 Freestyle Lite  /  Active  Device    1unit  testing    Alyson DÍAZ



 Blood Glucose          s  every day    Wendy,



 Monitoring            or as    M.DKrysten



 System            directed,    



             dx 250.00,    



             last visit    



             3/09/15    

 

 Freestyle Lite  /  Active  Strips    1box  test once    Alyson DÍAZ



 Test            daily dx:    Wendy



             250.00,    M.DKrysten



             last visit    



             3/9/15    

 

 Metformin HCL  /  Active  Tablets  1000mg  60tab  1 by mouth    Alyson DÍAZ



   2015        s  twice a day    ELIZABETH Nguyen

 

 Pravastatin  10/02/  Active  Tablets  10mg  90tab  1 by mouth  E11.65  Alyson DÍAZ



 Sodium  2014        s  at night    ELIZABETH Nguyen

 

 Centrum Silver  /  Active  Tablets  Adult 50    one by    Unknown



 Adult 50+  0000          mouth daily    

 

 Garlic Oil  /  Active  Capsules  1000mg    One by    Unknown



   0000          mouth once    



             a day    

 

 Omega 3  /  Active  Capsules  1000mg    1 by mouth    Unknown



   0000          once daily    

 

                 

 

 Cholestyramine  /  Hx  Powder  4GM/Dose  378gm  2 grams  R19.7  Alyson DÍAZ



    -          daily in 1    Wendy,



   /          cup of    M.D.



   2017          water, work    



             up to 4    



             grams in    



             water/liqui    



             d daily    

 

 Azithromycin  /  Hx  Tablets  250mg  7tabs  2 take by  MEL Alvares



    -          mouth    Hi,



   /          tabletstoda    FNP



             y,then one    



             tab days    



             2-5 until    



             finished    

 

 Gabapentin  /  Hx  Capsules  300mg  180ca  take one  M76.32  Alyson DÍAZ



    -        ps  capsule by    Wendy



   /          mouth every    M.D.



   2016          morning and    



             1 in the    



             evening    

 

 Xifaxan  /  Hx  Tablets  200mg  21tab  1 by mouth  K58.0  Alyson DÍZA



    -        s  three times    Wendy,



   /          daily x 7    M.D.



   2016          days    

 

 Physical Therapy  /  Hx        iliotibial  M76.32  Alyson DÍAZ



   2016 -          band    Wendy,



   /          syndrome.    M.D.



   2016              

 

 Guaifenesin ER  /  Hx  Tablets  600mg  30tab  1 po bid  J18.9  Ale



   2016 -    ER 12HR    s      Thompson,



   /              Afnp-C



   2016              

 

 Vitamin D  12/10/  Hx  Capsules  07046Qdyt  12cap  take 1    Alyson DÍAZ



 (Ergocalciferol)  2015  capsule by    Wendy,



   /          mouth once    M.D.



   2016          monthly    

 

 Mobic  10/12/  Hx  Tablets  7.5mg  30tab  1 by mouth  M15.0  Alyson DÍAZ



    -        s  every    Wendy,



   /          morning.    M.D.



   2016              

 

 Flexeril  /  Hx  Tablets  10mg  30tab  1 po tid  728.85  Anaid



   2015  prn    Juliet,



   /              Afnp-C



   2015              

 

 Chantix  10/16/  Hx  Tablets  1mg  60tab  1 by mouth  305.1  Alyson DÍAZ



    -        s  bid.    Wendy,



   /              M.D.



                 

 

 Metformin HCL  10/02/  Hx  Tablets  500mg  180ta  1 by mouth  250.02  Dia



    -          twice    Hi,



   /          daily.    FNP



                 

 

 Chantix Starting  /  Hx  Tablets  0.5mg X  1pack  use as  305.1  Rain



 Steff Fuchs  2014 & 1 mg    directed    GIBSON Catherine



   /      X 42        



                 

 

 Clarithromycin  /  Hx  Tablets  500mg  20tab  1 po bid x  461.0  Rain



   2014  10 days    GIBSON Catherine



   2014              

 

 Januvia  /  Hx  Tablets  100mg  30tab  take one  250.02  Dia



   2013  tablet by    Hi,



   /          mouth one    FNP



   2015          time daily    

 

 Chantix Starter  /  Hx      1unit  o.5 mg  305.1  Alyson Suarez  2013  daily x 3    Wendy,



   /          days.  0.5    M.D.



   2013          mg bid day    



             4-7.  1 mg    



             po bid    



             thereafter.    

 

 Chantix  /  Hx  Tablets  0.5mg  93tab  1 po daily  305.1  Alyson DÍAZ



   2013  x 3 days. 1    Wendy,



   /          po bid days    M.D.



             4-7 2 po    



             bid daily    



             thereafter    

 

 Chantix  /  Hx  Tablets  1mg  60tab  1 po bid.  305.1  Alyson DÍAZ



   2013  after    Wendy,



   /          finished    M.D.



             with    



             starter    



             pack.    

 

 Irbesartan  /  Hx  Tablets  75mg  30tab  1 by mouth  E11.65  Alyson DÍAZ



   2013  every day    Wendy,



   /              M.D.



                 

 

 Griselda Contour  /  Hx  Strips    100un  test as    Alyson DÍAZ



 Blood Glucose   -        its  directed    Wendy,



 Test Strips  /          twice a day    M.D.



                 

 

 Metformin HCL  /  Hx  Tablets  1000mg  60tab  take one  250.02  Hue



    -        s  tablet by    Cherelle,



   /          mouth twice    FNP



             a day    

 

 Azithromycin  /  Hx  Tablets  500mg  5tabs  1 po qd x  461.0  Hue



    -          5d    Cherelle,



   /              FNP



                 

 

 Azithromycin  10/10/  Hx  Tablets  250mg  12tab  take 2  461.0  Hue



    -        s  tablets by    Cherelle,



   /          mouth  x 3d    FNP



             then take 1    



             tablet    



             daily for    



             next 6 days    

 

 Proair HFA  10/10/  Hx  Aerosol  108(90Bas  1unit  2 puffs q  461.0  Hue



    -      e) mcg/ac  s  3-4h prn    Cherelle,



   /          cough/wheez    FNP



             e/sob    

 

 Robitussin A-C  10/10/  Hx      4Oz  1-2 tsp po  461.0  Hue



    -          q4h prn    Cherelle,



   /          cough    FNP



                 

 

 Hydrocodone/Acet  /  Hx  Tablets  2.5-500mg  30tab  1 po at hs  715.00  
Alyson DÍAZ



 aminophen   -        s      Wendy,



   10/10/              M.D.



                 

 

 Lorazepam  /  Hx  Tablets  1mg  30tab  1 po tid    Wendy,



   2012  for leg    Alyson DÍAZ



   /          crampinge    



                 

 

 Hydrocodone/Acet  /  Hx  Tablets  5-500mg  60tab  1 po bid  719.46  
Alyson DÍAZ



 aminophen   -        s      Wendy,



   08/10/              M.D.



                 

 

 Warfarin Sodium  /  Hx  Tablets  5mg  90tab  2 po qd or    Alyson DÍAZ



   2012        s  as directed    Wendy,



   08/10/              M.D.



                 

 

 Flexeril  /  Hx  Tablets  10mg  60tab  1 po bid  728.85  Alyson DÍAZ



   2012      Wendy,



                 M.D.



                 

 

 Oxycodone/Acetam  /  Hx  Tablets  5-325mg  90tab  1-2 po q4  453.40  
Alyson DÍAZ



 inophen   -        s  hrs prn    Wendy,



   /          pain    M.D.



                 

 

 Lovenox  /  Hx  Solution  120mg/0.8  3unit  inject sq    Alyson DÍAZ



   2012  s  bid    Wendy,



   /              M.D.



                 

 

 Lisinopril  /  Hx  Tablets  5mg  30tab  1 po qd    Alyson DÍAZ



   2012        s      Wendy,



                 M.D.



                 

 

 Contour Blood  /  Hx      1Box  use to  250.00  Alyson DÍAZ



 Test Strips   -          measure    Wendy,



   /          blood sugar    M.D.



             bid    

 

 Lancets  /  Hx      200un  test blood  250.00  Alyson DÍAZ



    -        its  sugar twice    Wendy,



   /          daily mail    M.D.



             to patient.    

 

 Metformin HCL  /  Hx  Tablets  500mg  100ta  1 po bid  250.00  Alyson DÍAZ



    -        bs      Wendy,



   /              M.D.



                 

 

 Chantix Starter  /  Hx      1unit  o.5 mg  305.1  Alyson Suarez   -        s  daily x 3    Wendy,



   /          days.  0.5    M.D.



   2012          mg bid day    



             4-7.  1 mg    



             po bid    



             thereafter.    

 

 Lorazepam  /  Hx  Tablets  1mg  30tab  1 po tid    Unknown



    -        s  for leg    



   /          vinny    



   2012              

 

 Fish Oil  /  Hx  Capsules  1200mg    1 po qd    Unknown



   0000 -    DR          



   2017              







Immunizations







 CPT Code  Status  Date  Vaccine  Lot #

 

 57061  Given  2013  Pneumococcal Immunization  [id number]

 

 81404  Given  04/15/2013  Tdap Tetanus, W Pertussis  [id number]







Vital Signs







 Date  Vital  Result  Comment

 

 2018  BP Systolic  140 mmHg  









 BP Diastolic  70 mmHg  

 

 Heart Rate  92 /min  

 

 Body Temperature  97.9 F  

 

 Respiratory Rate  18 /min  

 

 Height  63.5 inches  5'3.50"

 

 Weight  267.00 lb  

 

 BMI (Body Mass Index)  46.5 kg/m2  









 2018  BP Systolic  162 mmHg  









 BP Diastolic  90 mmHg  

 

 Heart Rate  76 /min  

 

 Body Temperature  98.6 F  

 

 Respiratory Rate  16 /min  

 

 Height  63.5 inches  5'3.50"

 

 Weight  276.00 lb  

 

 BMI (Body Mass Index)  48.1 kg/m2  









 2018  BP Systolic  140 mmHg  









 BP Diastolic  78 mmHg  

 

 Heart Rate  64 /min  

 

 Body Temperature  98.0 F  

 

 Respiratory Rate  18 /min  

 

 Height  63.5 inches  5'3.50"

 

 Weight  266.00 lb  

 

 BMI (Body Mass Index)  46.4 kg/m2  









 2017  BP Systolic  145 mmHg  









 BP Diastolic  86 mmHg  

 

 Heart Rate  80 /min  

 

 Body Temperature  97.7 F  

 

 Height  63.5 inches  5'3.50"

 

 Weight  255.00 lb  

 

 BMI (Body Mass Index)  44.5 kg/m2  









 2017  BP Systolic  130 mmHg  









 BP Diastolic  82 mmHg  

 

 Heart Rate  72 /min  

 

 Body Temperature  97.9 F  

 

 Respiratory Rate  16 /min  

 

 Height  63.5 inches  5'3.50"

 

 Weight  255.00 lb  

 

 BMI (Body Mass Index)  44.5 kg/m2  









 2017  BP Systolic  120 mmHg  









 BP Diastolic  80 mmHg  

 

 Heart Rate  88 /min  

 

 Body Temperature  98.3 F  

 

 Respiratory Rate  18 /min  

 

 Height  63.5 inches  5'3.50"

 

 Weight  253.00 lb  

 

 BMI (Body Mass Index)  44.1 kg/m2  









 2017  BP Systolic  142 mmHg  









 BP Diastolic  80 mmHg  

 

 Heart Rate  78 /min  

 

 Body Temperature  98.2 F  

 

 Respiratory Rate  16 /min  

 

 Height  63.5 inches  5'3.50"

 

 Weight  261.00 lb  

 

 BMI (Body Mass Index)  45.5 kg/m2  









 2016  BP Systolic  150 mmHg  









 BP Diastolic  80 mmHg  

 

 Heart Rate  96 /min  

 

 Body Temperature  98.6 F  

 

 Height  63.5 inches  5'3.50"

 

 Weight  260.00 lb  

 

 BMI (Body Mass Index)  45.3 kg/m2  









 2016  BP Systolic  156 mmHg  









 BP Diastolic  74 mmHg  

 

 Heart Rate  78 /min  

 

 Body Temperature  98.6 F  

 

 Respiratory Rate  16 /min  

 

 Height  63.5 inches  5'3.50"









 2016  BP Systolic  156 mmHg  









 BP Diastolic  80 mmHg  

 

 Heart Rate  78 /min  

 

 Body Temperature  98.1 F  

 

 Respiratory Rate  16 /min  

 

 Height  63.5 inches  5'3.50"

 

 Weight  272.00 lb  

 

 BMI (Body Mass Index)  47.4 kg/m2  









 2016  BP Systolic  134 mmHg  









 BP Diastolic  80 mmHg  

 

 Heart Rate  66 /min  

 

 Body Temperature  98.6 F  

 

 Respiratory Rate  16 /min  

 

 Height  63.5 inches  5'3.50"

 

 Weight  273.50 lb  

 

 BMI (Body Mass Index)  47.7 kg/m2  









 2016  BP Systolic  150 mmHg  









 BP Diastolic  80 mmHg  

 

 Heart Rate  88 /min  

 

 Body Temperature  98.2 F  

 

 Respiratory Rate  18 /min  

 

 O2 % BldC Oximetry  99 %  

 

 Height  63.5 inches  5'3.50"

 

 Weight  273.00 lb  

 

 BMI (Body Mass Index)  47.6 kg/m2  









 12/10/2015  BP Systolic  120 mmHg  









 BP Diastolic  82 mmHg  

 

 Heart Rate  72 /min  

 

 Body Temperature  97.9 F  

 

 Respiratory Rate  16 /min  

 

 Height  63.5 inches  5'3.50"

 

 Weight  265.00 lb  

 

 BMI (Body Mass Index)  46.2 kg/m2  









 10/12/2015  BP Systolic  136 mmHg  









 BP Diastolic  66 mmHg  

 

 Heart Rate  90 /min  

 

 Body Temperature  98.8 F  

 

 Respiratory Rate  16 /min  

 

 Height  63.5 inches  5'3.50"

 

 Weight  267.25 lb  

 

 BMI (Body Mass Index)  46.6 kg/m2  









 2015  BP Systolic  120 mmHg  









 BP Diastolic  70 mmHg  

 

 Heart Rate  80 /min  

 

 Body Temperature  98.5 F  

 

 Respiratory Rate  18 /min  

 

 Height  63.5 inches  5'3.50"

 

 Weight  268.00 lb  

 

 BMI (Body Mass Index)  46.7 kg/m2  









 2015  BP Systolic  142 mmHg  









 BP Diastolic  84 mmHg  

 

 Heart Rate  92 /min  

 

 Body Temperature  98.1 F  

 

 Height  63.5 inches  5'3.50"

 

 Weight  267.25 lb  

 

 BMI (Body Mass Index)  46.6 kg/m2  









 2015  BP Systolic  150 mmHg  









 BP Diastolic  84 mmHg  

 

 Heart Rate  66 /min  

 

 Body Temperature  97.8 F  

 

 Respiratory Rate  18 /min  

 

 Height  63.5 inches  5'3.50"

 

 Weight  263.00 lb  

 

 BMI (Body Mass Index)  45.9 kg/m2  









 2014  BP Systolic  138 mmHg  









 BP Diastolic  70 mmHg  

 

 Heart Rate  78 /min  

 

 Body Temperature  98.3 F  

 

 Respiratory Rate  16 /min  

 

 Height  63.5 inches  5'3.50"

 

 Weight  254.00 lb  

 

 BMI (Body Mass Index)  44.3 kg/m2  









 10/02/2014  BP Systolic  140 mmHg  









 BP Diastolic  80 mmHg  

 

 Heart Rate  70 /min  

 

 Body Temperature  97.2 F  

 

 Respiratory Rate  18 /min  

 

 Height  63.5 inches  5'3.50"

 

 Weight  248.00 lb  

 

 BMI (Body Mass Index)  43.2 kg/m2  









 2014  BP Systolic  140 mmHg  









 BP Diastolic  80 mmHg  

 

 Heart Rate  68 /min  

 

 Body Temperature  98.5 F  

 

 Respiratory Rate  18 /min  

 

 Height  63.5 inches  5'3.50"

 

 Weight  245.00 lb  

 

 BMI (Body Mass Index)  42.7 kg/m2  









 2014  BP Systolic  144 mmHg  









 BP Diastolic  90 mmHg  

 

 Heart Rate  80 /min  

 

 Body Temperature  98.0 F  

 

 Respiratory Rate  18 /min  

 

 Height  63.5 inches  5'3.50"

 

 Weight  254.00 lb  

 

 BMI (Body Mass Index)  44.3 kg/m2  









 2013  BP Systolic  146 mmHg  









 BP Diastolic  80 mmHg  

 

 Heart Rate  92 /min  

 

 Body Temperature  97.6 F  

 

 Respiratory Rate  16 /min  

 

 Height  63.5 inches  5'3.50"

 

 Weight  256.00 lb  

 

 BMI (Body Mass Index)  44.6 kg/m2  









 2013  BP Systolic  136 mmHg  









 BP Diastolic  80 mmHg  

 

 Heart Rate  84 /min  

 

 Body Temperature  98.9 F  

 

 Respiratory Rate  18 /min  

 

 Height  63.5 inches  5'3.50"









 2013  BP Systolic  130 mmHg  









 BP Diastolic  72 mmHg  

 

 Heart Rate  80 /min  

 

 Body Temperature  99.1 F  

 

 Height  63.5 inches  5'3.50"









 04/15/2013  BP Systolic  156 mmHg  









 BP Diastolic  86 mmHg  

 

 Heart Rate  78 /min  

 

 Body Temperature  98.5 F  

 

 Respiratory Rate  18 /min  

 

 Height  63.5 inches  5'3.50"

 

 Weight  265.12 lb  

 

 BMI (Body Mass Index)  46.2 kg/m2  









 2013  BP Systolic  130 mmHg  









 BP Diastolic  80 mmHg  

 

 Heart Rate  80 /min  

 

 Body Temperature  98.6 F  

 

 Respiratory Rate  16 /min  

 

 Height  63.5 inches  5'3.50"

 

 Weight  267.00 lb  

 

 BMI (Body Mass Index)  46.5 kg/m2  









 10/10/2012  BP Systolic  120 mmHg  









 BP Diastolic  88 mmHg  

 

 Heart Rate  92 /min  

 

 Body Temperature  99.2 F  

 

 Height  63.5 inches  5'3.50"

 

 Weight  263.00 lb  

 

 BMI (Body Mass Index)  45.9 kg/m2  









 08/10/2012  BP Systolic  140 mmHg  









 BP Diastolic  80 mmHg  

 

 Heart Rate  80 /min  

 

 Body Temperature  98.0 F  

 

 Height  63.5 inches  5'3.50"

 

 Weight  262.00 lb  

 

 BMI (Body Mass Index)  45.7 kg/m2  









 2012  BP Systolic  142 mmHg  









 BP Diastolic  78 mmHg  

 

 Heart Rate  76 /min  

 

 Body Temperature  98.6 F  

 

 Height  63.5 inches  5'3.50"

 

 Weight  254.00 lb  

 

 BMI (Body Mass Index)  44.3 kg/m2  









 2012  BP Systolic  138 mmHg  









 BP Diastolic  78 mmHg  

 

 Heart Rate  88 /min  

 

 Body Temperature  98.2 F  

 

 Height  63.5 inches  5'3.50"

 

 Weight  251.00 lb  

 

 BMI (Body Mass Index)  43.8 kg/m2  









 2012  BP Systolic  120 mmHg  









 BP Diastolic  80 mmHg  

 

 Heart Rate  68 /min  

 

 Body Temperature  98.7 F  

 

 Height  63.5 inches  5'3.50"









 2012  BP Systolic  120 mmHg  









 BP Diastolic  80 mmHg  

 

 Heart Rate  80 /min  

 

 Body Temperature  99.0 F  

 

 Height  63.5 inches  5'3.50"









 2012  BP Systolic  120 mmHg  









 BP Diastolic  80 mmHg  

 

 Heart Rate  68 /min  

 

 Body Temperature  98.2 F  

 

 Height  63.5 inches  5'3.50"

 

 Weight  261.00 lb  

 

 BMI (Body Mass Index)  45.5 kg/m2  









 2012  BP Systolic  120 mmHg  









 BP Diastolic  70 mmHg  

 

 Heart Rate  72 /min  

 

 Height  63.5 inches  5'3.50"

 

 Weight  261.00 lb  

 

 BMI (Body Mass Index)  45.5 kg/m2  









 2012  BP Systolic  118 mmHg  









 BP Diastolic  84 mmHg  

 

 Heart Rate  84 /min  

 

 Body Temperature  98.4 F  

 

 Height  63.5 inches  5'3.50"

 

 Weight  261.00 lb  

 

 BMI (Body Mass Index)  45.5 kg/m2  









 2012  BP Systolic  110 mmHg  









 BP Diastolic  78 mmHg  

 

 Heart Rate  88 /min  

 

 Body Temperature  98.2 F  

 

 Height  63.5 inches  5'3.50"

 

 Weight  258.00 lb  

 

 BMI (Body Mass Index)  45.0 kg/m2  









 2011  BP Systolic  120 mmHg  









 BP Diastolic  70 mmHg  

 

 Heart Rate  80 /min  

 

 Body Temperature  98.8 F  

 

 Height  63.5 inches  5'3.50"

 

 Weight  258.00 lb  

 

 BMI (Body Mass Index)  45.0 kg/m2  







Results







 Test  Date  Test  Result  H/L  Range  Note

 

 Comprehensive Metabolic Prof  2018  Sodium  135 mEq/L    134-149  









 Potassium  4.2 mEq/L    3.6-5.5  

 

 Chloride  98 mEq/L      

 

 Carbon Dioxide  25 mEq/L    21-32  

 

 Glucose  153 mg/dL  High    

 

 BUN  12 mg/dL    6-26  

 

 Creatinine  0.7 mg/dL    0.6-1.4  

 

 BUN/Creat Ratio  17.1 CALC    8.0-36.0  

 

 Calcium  9.4 mg/dL    8.6-10.2  

 

 Total Protein  6.7 g/dL    6.4-8.3  

 

 Albumin  4.3 g/dL    3.8-5.5  

 

 Globulin  2.4 g/dL    2.0-4.8  

 

 A/G Ratio  1.8 CALC    0.6-2.3  

 

 Alk. Phosphatase  74 U/L      

 

 Alt (SGPT)  26 U/L    7-35  

 

 Ast (Sgot)  13 U/L    5-34  

 

 Total Bilirubin  0.7 mg/dL    0.2-1.3  

 

 GFR Non-African American  >60 ml/min/1.73m^    >=60  

 

 GFR African American  >60 ml/min/1.73m^    >=60  









 Lipid Profile  2018  Cholesterol  224 mg/dL  High  120-200  









 Triglycerides  202 mg/dL  High    

 

 HDL Cholesterol  75 mg/dL    30-85  

 

 LDL (Calculated)  109 CALC    0-129  

 

 VLDL Cholesterol  40 mg/dL    0-50  

 

 HDL Risk Factor  3.0 CALC    0.0-4.4  









 Laboratory test finding  2018  Hemoglobin A1c (Fma)  7.4 % %  High  4.1-
5.7  

 

 Urinalysis Profile  2018  Urine Color  Yellow      









 Urine Appearance  Clear      

 

 Urine Specific Gravity  1.017    1.010-1.030  

 

 Urine pH  5.0    5-9  

 

 Urine Urobilinogen  Negative    Negative  

 

 Urine Ketones  Negative    Negative  

 

 Urine Protein  Negative    Negative  

 

 Urine Leukocytes  Trace    Negative  

 

 Urine Blood  Negative    Negative  

 

 Urine Nitrite  Negative    Negative  

 

 Urine Bilirubin  Negative    Negative  

 

 Urine Glucose  Negative    Negative  

 

 Urine White Blood Cell  Trace(0-5/hpf)    Absent  

 

 Urine Red Blood Cell  Trace(0-2/hpf)    Absent  

 

 Urine Bacteria  Absent    Absent  

 

 Urine Squamous Epithelial Cell  Present    Absent  









 Laboratory test  2018  Urine Culture And  SEE RESULT BELOW      1



 finding    Sensitivities        

 

 Laboratory test  2017  Hemoglobin A1c ([facility])  6.6 %  High  4.1-5.7  



 finding            

 

 Laboratory test  2017  Surgical Pathology  SEE RESULT BELOW      2



 finding            

 

 Ua - Micro ([facility])  2017  Appearance  clear      









 Color  yellow      

 

 Glucose, Urine ([facility]/CTX)  neg      

 

 Bilirubin  neg      

 

 Ketones  neg      

 

 SP Grav  <=1.005      

 

 Blood  neg      

 

 PH  5.5      

 

 Protein  neg      

 

 Urobil  0.2      

 

 Nitrite  neg      

 

 Leukocytes ([facility])  small      

 

 WBC ([facility])  4-6      









 Laboratory test finding  2017  Potassium Redraw  4.1 mmol/L    3.5-5.0  









 Ast Redraw  9 U/L  Low  13-39  









 CBC Auto Diff  2017  White Blood Count  15.8 10^3/uL  High  3.5-10.8  









 Red Blood Count  4.83 10^6/uL    4.0-5.4  

 

 Hemoglobin  14.3 g/dL    12.0-16.0  

 

 Hematocrit  43 %    35-47  

 

 Mean Corpuscular Volume  89 fL    80-97  

 

 Mean Corpuscular Hemoglobin  30 pg    27-31  

 

 Mean Corpuscular HGB Conc  33 g/dL    31-36  

 

 Red Cell Distribution Width  14 %    10.5-15  

 

 Platelet Count  295 10^3/uL    150-450  

 

 Mean Platelet Volume  10 um3    7.4-10.4  

 

 Abs Neutrophils  12.2 10^3/uL  High  1.5-7.7  

 

 Abs Lymphocytes  2.2 10^3/uL    1.0-4.8  

 

 Abs Monocytes  1.1 10^3/uL  High  0-0.8  

 

 Abs Eosinophils  0.3 10^3/uL    0-0.6  

 

 Abs Basophils  0.1 10^3/uL    0-0.2  

 

 Abs Nucleated RBC  0 10^3/uL      

 

 Granulocyte %  77.1 %    38-83  

 

 Lymphocyte %  13.8 %  Low  25-47  

 

 Monocyte %  6.9 %    1-9  

 

 Eosinophil %  1.7 %    0-6  

 

 Basophil %  0.5 %    0-2  

 

 Nucleated Red Blood Cells %  0      









 Laboratory test finding  2017  Lactic Acid  1.3 mmol/L    0.5-2.0  3

 

 Comp Metabolic Panel  2017  Sodium  134 mmol/L    133-145  









 Chloride  102 mmol/L    101-111  

 

 Co2 Carbon Dioxide  25 mmol/L    22-32  

 

 Glucose  201 mg/dL  High    4

 

 Blood Urea Nitrogen  9 mg/dL    6-24  5

 

 Creatinine  0.71 mg/dL    0.51-0.95  6

 

 BUN/Creatinine Ratio  12.7    8-20  

 

 Calcium  9.3 mg/dL    8.6-10.3  7

 

 Total Protein  7.5 g/dL    6.4-8.9  8

 

 Albumin  4.2 g/dL    3.2-5.2  9

 

 Globulin  3.3 g/dL    2-4  

 

 Albumin/Globulin Ratio  1.3    1-3  

 

 Total Bilirubin  1.10 mg/dL  High  0.2-1.0  10

 

 Alkaline Phosphatase  70 U/L      11

 

 Alt  16 U/L    7-52  12

 

 Egfr Non-  82.9    >60  

 

 Egfr   106.6    >60  13

 

 Potassium  TNP mmol/L    3.5-5.0  14

 

 Anion Gap  7 mmol/L    2-11  

 

 Ast  TNP U/L    13-39  15









 Laboratory test finding  2017  Lipase  19 U/L    11.0-82.0  16









 C Reactive Protein  9.47 mg/L  High  < 5.00  17









 Urinalysis Profile  2017  Urine Color  Yellow      









 Urine Appearance  Clear      

 

 Urine Specific Gravity  1.019    1.010-1.030  

 

 Urine pH  5.0    5-9  

 

 Urine Urobilinogen  Negative    Negative  

 

 Urine Ketones  Negative    Negative  

 

 Urine Protein  Negative    Negative  

 

 Urine Leukocytes  2+    Negative  

 

 Urine Blood  Negative    Negative  

 

 Urine Nitrite  Negative    Negative  

 

 Urine Bilirubin  Negative    Negative  

 

 Urine Glucose  Negative    Negative  

 

 Urine White Blood Cell  1+(6-10/hpf)    Absent  

 

 Urine Red Blood Cell  Absent    Absent  

 

 Urine Bacteria  Absent    Absent  

 

 Urine Squamous Epithelial Cell  Present    Absent  









 Laboratory test finding  2017  Hemoglobin A1c (Glyco  6.9 %  High  Less 
than 6.0  18



     HGB)        









 Urine Culture And Sensitivities  SEE RESULT BELOW      19









 Laboratory test finding  2017  Stool Culture  SEE RESULT BELOW      20









 O&P Ova & Parasites Full  SEE RESULT BELOW      21

 

 Parasitic Examination  See Comment      22









 Basic Metabolic Profile  2016  Sodium  138 mEq/L    134-149  









 Potassium  4.3 mEq/L    3.6-5.5  

 

 Chloride  100 mEq/L      

 

 Carbon Dioxide  24 mEq/L    21-32  

 

 Glucose  201 mg/dL  High    23

 

 BUN  12 mg/dL    6-26  

 

 Creatinine  0.6 mg/dL    0.6-1.4  

 

 BUN/Creat Ratio  20.0 CALC    8.0-36.0  

 

 Calcium  9.4 mg/dL    8.6-10.2  

 

 GFR Non-African American  >60 ml/min/1.73m^    >=60  

 

 GFR African American  >60 ml/min/1.73m^    >=60  









 Laboratory test finding  2016  Hemoglobin A1c ([facility])  6.5 %  High  4.1-
5.7  

 

 Laboratory test finding  2016  Hemoglobin A1c ([facility])  6.6 %  High  4.1-
5.7  

 

 Laboratory test finding  12/10/2015  Hemoglobin A1c  6.9 %  High  4.1-5.7  



     ([facility])        

 

 Complete Blood Count  12/10/2015  WBC  9.4 x10^3/UL    3.6-9.6  









 RBC  4.60 x10^6/UL    3.90-5.70  

 

 HGB  13.3 g/dL    12.1-17.2  

 

 HCT  41 %    36-50  

 

 MCV  89.0 fL    82.2-97.4  

 

 MCH  29.0 pg    27.6-33.3  

 

 MCHC  32.6 g/dL  Low  33.0-35.5  

 

 RDW  12.9 %    11.6-13.7  

 

 PLT  270 x10^3/UL    150-400  

 

 MPV  7.2 fL  Low  7.4-10.4  

 

 Gran #  6.8 x10^3/UL    1.5-7.2  

 

 Lymph#  2.3 x10^3/UL    0.7-4.9  

 

 Mono#  0.3 x10^3/UL    0.1-0.9  

 

 Gran %  71.2 %    42.2-75.2  

 

 Lymph %  25.2 %    20.5-51.1  

 

 Mono%  3.6 %    1.7-9.3  









 Lipid Profile  12/10/2015  Cholesterol  209 mg/dL  High  120-200  









 Triglycerides  151 mg/dL      

 

 HDL Cholesterol  66 mg/dL    30-85  

 

 LDL (Calculated)  113 CALC    0-129  

 

 VLDL Cholesterol  30 mg/dL    0-50  

 

 HDL Risk Factor  3.2 CALC    0.0-4.4  









 Laboratory test finding  12/10/2015  Vitamin D25  37      









 TSH  1.66 mIU/L    0.50-6.00  

 

 Free T4  1.26 ng/dL    0.75-1.54  









 Comprehensive Metabolic Prof  12/10/2015  Sodium  140 mEq/L    134-149  









 Potassium  4.6 mEq/L    3.6-5.5  

 

 Chloride  97 mEq/L      

 

 Carbon Dioxide  27 mEq/L    21-32  

 

 Glucose  177 mg/dL  High    24

 

 BUN  14 mg/dL    6-26  

 

 Creatinine  0.7 mg/dL    0.6-1.4  

 

 BUN/Creat Ratio  20.0 CALC    8.0-36.0  

 

 Calcium  9.7 mg/dL    8.6-10.2  

 

 Total Protein  7.6 g/dL    6.4-8.3  

 

 Albumin  4.7 g/dL    3.8-5.5  

 

 Globulin  2.9 g/dL    2.0-4.8  

 

 A/G Ratio  1.6 CALC    0.6-2.3  

 

 Alk. Phosphatase  64 U/L      

 

 Alt (SGPT)  19 U/L    7-35  

 

 Ast (Sgot)  15 U/L    5-34  

 

 Total Bilirubin  1.0 mg/dL    0.2-1.3  

 

 GFR Non-African American  >60 ml/min/1.73m^    >=60  

 

 GFR African American  >60 ml/min/1.73m^    >=60  









 Laboratory test finding  09/15/2015  Clotest  SEE RESULT BELOW      25

 

 Laboratory test finding  09/15/2015  Surgical Pathology  SEE RESULT BELOW      
26

 

 Ict Hemoccult (a)  09/15/2015  Ict Hemoccult (1)  9/4/15 NEG      









 Ict Hemoccult-(2)  9/5/15 NEG      

 

 Ict-Hemoccult (3)  9/6/15 NEG      









 Laboratory test  2015  Stool Occult Blood  SEE RESULT BELOW      27



 finding            

 

 Laboratory test  2015  Hemoglobin A1c  8.1 %  High  4.1-5.7  



 finding    (a/[facility] [place]        

 

 Laboratory test  2015  Hemoglobin A1c  7.7 %  High  4.1-5.7  



 finding    (a/[facility] [place]        

 

 Laboratory test  2014  Hemoglobin A1c  7.8 %  High  4.1-5.7  



 finding    ([facility],CX)        

 

 Lipid Profile  2014  Cholesterol  265 mg/dL  High  120-200  









 Triglycerides  182 mg/dL      

 

 HDL Cholesterol  51 mg/dL    30-85  

 

 LDL (Calculated)  178 CALC  High  0-129  

 

 VLDL Cholesterol  36 mg/dL    0-50  

 

 HDL Risk Factor  5.2 CALC  High  0.0-4.4  









 Laboratory test  2014  Hemoglobin A1c  9.5 %  High  4.1-5.7  



 finding    ([facility])        

 

 Microalb/Creatinine,  2014  Microalb, Random  17.8 mg/L mg/L    0.5-37  



 Random Ur    ([facility]/CTX)        









 Urine Creatinine (F/C/CTX)  10.6 mmol/L nmol/L      

 

 Microalb.,Creatinine (F/C/CTX)  1.7mg/mmol      









 Laboratory test finding  2014  TSH  2.51 mIU/L    0.50-6.00  

 

 Comprehensive Metabolic Prof  2014  Sodium  135 mEq/L    134-149  









 Potassium  4.6 mEq/L    3.6-5.5  

 

 Chloride  98 mEq/L      

 

 Carbon Dioxide  29 mEq/L    21-32  

 

 Glucose  258 mg/dL  High    28

 

 BUN  11 mg/dL    6-26  

 

 Creatinine  0.7 mg/dL    0.6-1.4  

 

 BUN/Creat Ratio  15.7 CALC    8.0-36.0  

 

 Calcium  9.4 mg/dL    8.6-10.2  

 

 Total Protein  7.6 g/dL    6.3-8.1  

 

 Albumin  4.1 g/dL    3.8-5.5  

 

 Globulin  3.5 g/dL    2.0-4.8  

 

 A/G Ratio  1.2 CALC    0.6-2.3  

 

 Alk. Phosphatase  82 U/L      

 

 Alt (SGPT)  17 U/L    7-35  

 

 Ast (Sgot)  13 U/L    5-34  

 

 Total Bilirubin  0.9 mg/dL    0.2-1.3  









 Surgical Pathology  2014  S  RUN DATE:       <SEE      



       NOTE>      

 

 Surgical Pathology  2013  S  RUN DATE:      30



       05/15/ <SEE      



       NOTE>      

 

 Laboratory test finding  04/15/2013  Hemoglobin A1c  8.9 %  High  4.1-5.7  



     ([facility],CX)        

 

 Laboratory test finding  2013  Hemoglobin A1c  10.1 %  High  4.1-5.7  



     ([facility],CX)        

 

 Laboratory test finding  2012  Surgical Pathology  ---------------      
31



       - <SEE NOTE>      

 

 Laboratory test finding  2012  Inr ([facility])  2.5    2.0-3.0  

 

 Laboratory test finding  2012  Inr ([facility])  3.1  High  2.0-3.0  

 

 Laboratory test finding  2012  Inr ([facility])  2.6    2-3  

 

 Laboratory test finding  2012  Inr ([facility])  3.8  High  2.0-3.0  

 

 Laboratory test finding  2012  Inr ([facility])  2.6  High  0.9-1.1  

 

 Laboratory test finding  2012  Inr ([facility])  1.4  Low  2-3  

 

 Laboratory test finding  2012  Inr ([facility])  2.0    2-3  

 

 Laboratory test finding  2012  Inr ([facility])  2.9    2.0-3.0  

 

 Laboratory test finding  2012  Inr ([facility])  2.5    2.0-3.0  

 

 Laboratory test finding  2012  Inr ([facility])  1.6  Low  2.0-3.0  

 

 Laboratory test finding  2012  Inr ([facility])  1.3  Low  2.0-3.0  

 

 Laboratory test finding  2012  Inr ([facility])  1.1  Low  2.0-3.0  

 

 Comprehensive Metabolic  2012  Albumin  4.5 g/dL    3.8-5.5  



 Prof            









 Alk. Phos.  93 U/L      

 

 Alt (SGPT)  16 U/L    7-35  

 

 Ast (Sgot)  11 U/L    5-34  

 

 BUN  10 mg/dL    6-26  

 

 Calcium  9.9 mg/dL    8.6-10.2  

 

 Chloride  94 mEq/L      

 

 Creatinine  0.8 mg/dL    0.6-1.4  

 

 Carbon Dioxide  26 mEq/L    21-32  

 

 Glucose  218 mg/dL  High    32

 

 Sodium  134 mEq/L    134-149  

 

 Total Bilirubin  0.7 mg/dL    0.2-1.3  

 

 Total Protein  6.9 g/dL    6.3-8.1  

 

 Potassium  4.2 mEq/L    3.6-5.5  

 

 Globulin  2.5 g/dL    2.0-4.8  

 

 A/G Ratio  1.8 Calc    0.6-2.2  

 

 BUN/Creat Ratio  12.8 Calc    8.0-36.0  









 CBC Electronic (a)  2012  WBC  7.5    3.6-9.6  









 RBC  4.06    3.90-5.70  

 

 Hemoglobin ([facility]/CTX)  11.5 g/dL  Low  12.1 - 17.2  

 

 Hematocrit ([facility]/CTX)  35.3 %  Low  36.1 - 50.3  

 

 Platelets  368 10^3/ul    150-400  

 

 Lymph%  32.6    20.5-51.1  

 

 Mixed%  3.4      

 

 Neutrophils %  64.0      

 

 Mean Corpuscular Vol  87    82.2-97.4  

 

 Mean Corpuscular Hemoglobin  28.4    27.6-33.3  

 

 Mean Corpuscular Hemo Concen  32.7    32.0-36.0  

 

 RDW  12.1    11.6-13.7  

 

 Mean Platelet Volume  7.5    6.5-11.0  









 Laboratory test finding  2012  Magnesium  1.9 mg/dL    1.7-2.6  

 

 CBC Auto Diff  2012  White Blood Count  9.3 CUMM    4.8-10.8  









 Red Cell Count  3.92 CUMM  Low  4.2-5.4  

 

 Hemoglobin  11.6 g/dL  Low  12.0-16.0  

 

 Hematocrit  34 %  Low  35-47  

 

 Mean Corpuscular Volume  86 um3    79-97  

 

 Mean Corpuscular Hemoglob  30 pg    27-31  

 

 Mean Corpuscular HGB Cone  34 g/dL    32-36  

 

 Redcell Distribution WDTH  13 %    10.5-15  

 

 Platelet Count  305 CUMM    150-450  

 

 Mean Platelet Volume  8.3 um3    7.4-10.4  

 

 Gran %  52.9 %    38-83  

 

 Lymph %  36.7 %    25-47  

 

 Mononuclear %  7.6 %    1-9  

 

 Eosinophil %  1.9 %    0-6  

 

 Basophil %  0.9 %    0-2  

 

 Abs Lymphs  3.4    1.0-4.8  

 

 Abs Mononuclear  0.7    0-0.8  

 

 Absolute Neutrophil Count  4.9    1.5-7.7  

 

 Abs Eosinophils  0.2    0-0.6  

 

 Abs Basophils  0.1    0-0.2  









 Comp Metabolic Panel  2012  Sodium  134 mmol/L  Low  135-145  









 Potassium  4.0 mmol/L    3.5-5.0  

 

 Chloride  100 mmol/L  Low  101-111  

 

 Co2 (Carbon Dioxide)  22.0 mmol/L    22-32  

 

 Anion Gap  12.0 mmol/L  High  2-11  33

 

 Glucose  222 mg/dL  High    

 

 BUN  12 mg/dL    6-24  

 

 Creatinine  0.8 mg/dL    0.50-1.40  

 

 One Over Creatinine  1.25      

 

 BUN/Creatinine Ratio  15.0    8-20  

 

 Calcium  9.8 mg/dL    8.1-9.9  

 

 Total Protein  6.5 GM/DL    6.2-8.1  

 

 Albumin  3.9 GM/DL    3.6-5.4  

 

 Globulin  2.6 GM/DL    2-4  

 

 Albumin/Globulin Ratio  1.5    1-3  

 

 Bilirubin Total  0.7 mg/dL    0.4-1.5  34

 

 Alkaline Phosphatase  85 U/L      

 

 Alt (SGPT)  18 U/L    14-54  

 

 Ast (Sgot)  19 U/L    12-42  

 

 eGFR Non-  73.4    > 60  

 

 eGFR   94.4    > 60  35









 CBC Auto Diff  2012  White Blood Count  8.9 CUMM    4.8-10.8  36









 Red Cell Count  4.61 CUMM    4.2-5.4  36

 

 Hemoglobin  13.9 g/dL    12.0-16.0  36

 

 Hematocrit  40 %    35-47  36

 

 Mean Corpuscular Volume  87 um3    79-97  36

 

 Mean Corpuscular Hemoglob  30 pg    27-31  36

 

 Mean Corpuscular HGB Cone  35 g/dL    32-36  36

 

 Redcell Distribution WDTH  13 %    10.5-15  36

 

 Platelet Count  246 CUMM    150-450  36

 

 Mean Platelet Volume  8.8 um3    7.4-10.4  36

 

 Gran %  71.0 %    38-83  36

 

 Lymph %  20.8 %  Low  25-47  36

 

 Mononuclear %  5.7 %    1-9  36

 

 Eosinophil %  2.2 %    0-6  36

 

 Basophil %  0.3 %    0-2  36

 

 Abs Lymphs  1.8    1.0-4.8  36

 

 Abs Mononuclear  0.5    0-0.8  36

 

 Absolute Neutrophil Count  6.3    1.5-7.7  36

 

 Abs Eosinophils  0.2    0-0.6  36

 

 Abs Basophils  0    0-0.2  36









 Type And Screen  2012  Patient Blood Type  A POSITIVE      36









 Antibody Screen  NEGATIVE      36

 

 Specimen Discard Date  12      36, 37









 Basic Metabolic Panel  2012  Sodium  137 mmol/L    135-145  36









 Potassium  4.2 mmol/L    3.5-5.0  36

 

 Chloride  103 mmol/L    101-111  36

 

 Co2 (Carbon Dioxide)  25.0 mmol/L    22-32  36

 

 Anion Gap  9.0 mmol/L    2-11  36, 38

 

 Glucose  235 mg/dL  High    36

 

 BUN  8 mg/dL    6-24  36

 

 Creatinine  0.7 mg/dL    0.50-1.40  36

 

 One Over Creatinine  1.42      36

 

 BUN/Creatinine Ratio  11.4    8-20  36

 

 Calcium  8.8 mg/dL    8.1-9.9  36

 

 eGFR Non-  85.6    > 60  36

 

 eGFR   110.1    > 60  36, 39









 Urinalysis W/Microscopic  2012  Ua Color  YELLOW    Yellow  36









 Appearance-Urine  CLEAR    Clear  36

 

 Specific Gravity-Ur  1.021    1.010-1.030  36

 

 Esterase-Urine  1+    Negative  36

 

 Nitrite  NEGATIVE    Negative  36

 

 Urobilinogen-Ur-DIP  NEGATIVE    Negative  36

 

 Protein-Urine  NEGATIVE    Negative  36

 

 PH-Urine  5.0    5-9  36

 

 Blood-Urine  NEGATIVE    Negative  36

 

 Ketones-Urine  NEGATIVE    Negative  36

 

 Bilirubin-Ur  NEGATIVE    Negative  36

 

 Glucose-Urine  NEGATIVE    Negative  36

 

 WBC-Urine  3-5    0-5  36

 

 RBC-Urine  0-2    0-2  36

 

 Epith Cells-Ur  OCCASIONAL    None  36

 

 Bacteria-Urine  TRACE    None  36









 Urine Culture &  2012  M  ---------------- <SEE NOTE>      36, 40



 Sensitivi            

 

 Basic Metabolic Panel  2012  Sodium  135 mmol/L    135-145  









 Potassium  4.2 mmol/L    3.5-5.0  

 

 Chloride  99 mmol/L  Low  101-111  

 

 Co2 (Carbon Dioxide)  29.0 mmol/L    22-32  

 

 Anion Gap  7.0 mmol/L    2-11  41

 

 Glucose  189 mg/dL  High    

 

 BUN  14 mg/dL    6-24  

 

 Creatinine  0.7 mg/dL    0.50-1.40  

 

 One Over Creatinine  1.42      

 

 BUN/Creatinine Ratio  20.0    8-20  

 

 Calcium  9.1 mg/dL    8.1-9.9  

 

 eGFR Non-  85.6    > 60  

 

 eGFR   110.1    > 60  42









 CBC No Diff  2012  White Blood Count  10.4 CUMM    4.8-10.8  









 Red Cell Count  4.40 CUMM    4.2-5.4  

 

 Hemoglobin  13.5 g/dL    12.0-16.0  

 

 Hematocrit  38 %    35-47  

 

 Mean Corpuscular Volume  87 um3    79-97  

 

 Mean Corpuscular Hemoglob  31 pg    27-31  

 

 Mean Corpuscular HGB Cone  35 g/dL    32-36  

 

 Redcell Distribution WDTH  13 %    10.5-15  

 

 Platelet Count  282 CUMM    150-450  

 

 Mean Platelet Volume  9.5 um3    7.4-10.4  









 Protime  2012  Inr  0.89    0.88-1.13  43









 Protime  10.5 SEC    10.3-13.5  44









 Laboratory test finding  2012  Hemoglobin A1c  8.8 %  High  4.1-5.7  



     ([facility],CX)        

 

 Comprehensive Metabolic  2012  Albumin  4.1 g/dL    3.8-5.5  



 Prof            









 Alk. Phos.  92 U/L      

 

 Alt (SGPT)  35 U/L    7-35  

 

 Ast (Sgot)  14 U/L    5-34  

 

 BUN  10 mg/dL    6-26  

 

 Calcium  9.2 mg/dL    8.6-10.2  

 

 Chloride  97 mEq/L      

 

 Creatinine  0.7 mg/dL    0.6-1.4  

 

 Carbon Dioxide  29 mEq/L    21-32  

 

 Glucose  218 mg/dL  High    45

 

 Sodium  138 mEq/L    134-149  

 

 Total Bilirubin  0.6 mg/dL    0.2-1.3  

 

 Total Protein  6.4 g/dL    6.3-8.1  

 

 Potassium  4.4 mEq/L    3.6-5.5  

 

 Globulin  2.3 g/dL    2.0-4.8  

 

 A/G Ratio  1.8 Calc    0.6-2.2  

 

 BUN/Creat Ratio  15.8 Calc    8.0-36.0  









 Lipid Profile  2012  Cholesterol  180 mg/dL    120-200  









 HDL  44 mg/dL    30-85  

 

 Triglycerides  126 mg/dL      

 

 HDL Risk Factor  4.1 CALC  High  0.0-4.0  

 

 LDL (Calculated)  111 CALC    0-129  

 

 VLDL (Calculated)  25 mg/dL    0-50  









 CBC Electronic ([facility])  2012  WBC  8.5    3.6-9.6  









 RBC  4.33    3.90-5.70  

 

 Hemoglobin ([facility]/CTX)  12.4 g/dL    12.1 - 17.2  

 

 Hematocrit ([facility]/CTX)  37.8 %    36.1 - 50.3  

 

 Platelets  319 10^3/ul    150-400  

 

 Lymph%  31.2    20.5-51.1  

 

 Mixed%  4.1      

 

 Neutrophils %  64.7      

 

 Mean Corpuscular Vol  87    82.2-97.4  

 

 Mean Corpuscular Hemoglobin  28.7    27.6-33.3  

 

 Mean Corpuscular Hemo Concen  32.9    32.0-36.0  

 

 RDW  11.5  Low  11.6-13.7  

 

 Mean Platelet Volume  8.3    6.5-11.0  









 Comprehensive Metabolic Prof  2011  Albumin  4.3 g/dL    3.8-5.5  









 Alk. Phos.  96 U/L      

 

 Alt (SGPT)  22 U/L    7-35  

 

 Ast (Sgot)  12 U/L    5-34  

 

 BUN  10 mg/dL    6-26  

 

 Calcium  9.3 mg/dL    8.6-10.2  

 

 Chloride  99 mEq/L      

 

 Creatinine  0.7 mg/dL    0.6-1.4  

 

 Carbon Dioxide  26 mEq/L    21-32  

 

 Glucose  222 mg/dL  High    46

 

 Sodium  140 mEq/L    134-149  

 

 Total Bilirubin  0.7 mg/dL    0.2-1.3  

 

 Total Protein  6.9 g/dL    6.3-8.1  

 

 Potassium  4.2 mEq/L    3.6-5.5  

 

 Globulin  2.6 g/dL    2.0-4.8  

 

 A/G Ratio  1.7 Calc    0.6-2.2  

 

 BUN/Creat Ratio  13.4 Calc    8.0-36.0  









 Lipid Profile  2011  Cholesterol  265 mg/dL  High  120-200  47









 HDL  52 mg/dL    30-85  

 

 Triglycerides  187 mg/dL      

 

 HDL Risk Factor  5.1 CALC  High  0.0-4.0  

 

 LDL (Calculated)  176 CALC  High  0-129  

 

 VLDL (Calculated)  37 mg/dL    0-50  









 CBC Electronic ([facility])  2011  WBC  8.0    3.6-9.6  









 RBC  5.02    3.90-5.70  

 

 Hemoglobin ([facility]/CTX)  14.9 g/dL    12.1 - 17.2  

 

 Hematocrit ([facility]/CTX)  44.0 %    36.1 - 50.3  

 

 Platelets  314 10^3/ul    150-400  

 

 Lymph%  27.3    20.5-51.1  

 

 Mixed%  6.6      

 

 Neutrophils %  66.1      

 

 Mean Corpuscular Vol  88    82.2-97.4  

 

 Mean Corpuscular Hemoglobin  29.6    27.6-33.3  

 

 Mean Corpuscular Hemo Concen  33.9    32.0-36.0  

 

 RDW  12.0    11.6-13.7  

 

 Mean Platelet Volume  7.8    6.5-11.0  









 1  SEE RESULT BELOW



   -----------------------------------------------------------------------------
---------------



   Name:  HUE DU                     : 1955    Attend Dr: Araceli Monterroso MD



   Acct:  [id number]  Unit: [id number]  AGE: 63            Location:  [facility]



   Re18                        SEX: F             Status:    DEP ER



   -----------------------------------------------------------------------------
---------------



   



   SPEC: [id number]         TIFFANY:       SUBM DR: Dia CRENSHAW



   REQ:  [id number]              RECD:   [id number]



   STATUS: VIVI SOLITARIO DR: Araceli Nguyen MD



   _



   SOURCE: URINE          SPDESC:



   ORDERED:  Urine Culture



   



   -----------------------------------------------------------------------------
---------------



   Procedure                         Result                         Reported   
        Site



   -----------------------------------------------------------------------------
---------------



   Urine Culture  Final                                             18-
[id number]      ML



   



   No growth of clinically significant organisms



   



   -----------------------------------------------------------------------------
---------------



   * ML - MAIN LAB ([facility])



   .



   



   



   



   



   



   



   



   



   



   



   



   



   



   



   



   



   



   



   



   



   



   



   



   



   



   ** END OF REPORT **



   



   * ML=Testing performed at Main Lab



   DEPARTMENT OF PATHOLOGY,  [address] [place] [address]



   Phone # [phone]      Fax #[phone]



   Duane Hopkins M.D. Director     [facility] # [id number]

 

 2  SEE RESULT BELOW



   -----------------------------------------------------------------------------
---------------



   Name:  HUE DU                     : 1955    Attend Dr: Veto Esquivel MD



   Acct:  [id number]  Unit: [id number]  AGE: 62            Location:  ENDO



   Re17                        SEX: F             Status:    DEP REF



   -----------------------------------------------------------------------------
---------------



   



   SPEC: [id number]             TIFFANY: 17-      [facility] DR: Veto Esquivel MD



   REQ:  [id number]             RECD: 



   STATUS: ALYSON SOLITARIO DR: Alyson Nguyen MD



   _



   ORDERED:  LEVEL 4/3



   



   FINAL DIAGNOSIS



   



   



   1.   Colon, distal sigmoid, biopsy:



   -- Hyperplastic polyp.



   



   2.   Colon, ileocecal valve, biopsy:



   -- Tubular adenoma.



   -- No high grade dysplasia or malignancy.



   



   3.   Colon, descending, biopsy:



   -- Tubular adenoma.



   -- No high grade dysplasia or malignancy.



   



   



   



   CLINICAL HISTORY



   



   Follow up after polyp, diarrhea three to four times no blood noted.



   



   POST-OPERATIVE DIAGNOSIS



   



   Colonoscopy to cecum with ease. Conclusions/Plan: Three polyps follow up 
after pathology,



   five year follow up.



   



   GROSS DESCRIPTION



   



   1.   The specimen is received in formalin labeled, Distal Sigmoid Polyp, and 
consists of a



   0.6 x 0.4 by up to 0.3 cm thick of tan-pink irregular to polypoid soft 
tissue fragment,



   which is entirely submitted in one cassette.



   



   2.   The specimen is received in formalin labeled, Biopsy Ileocecal Polyp, 
and consists of a



   0.6 x 0.4 by up to 0.3 cm tan-pink irregular to polypoid soft tissue fragment
, which is



   entirely submitted in one cassette.



   



   3.   The specimen is received in formalin labeled, Biopsy Descending Colon 
Polyp, and



   consists of a 0.5 x 0.4 x 0.3 centimeters tan-pink irregular to polypoid 
soft tissue



   



   ** CONTINUED ON NEXT PAGE **



   



   * ML=Testing performed at Main Lab



   DEPARTMENT OF PATHOLOGY,  [address] [place] [address]



   Phone # [phone]      Fax #[phone]



   Duane Hokpins M.D. Director     [facility] # [id number]



   



   



   



   RUN DATE: 17               [facility] LAB **LIVE**         
       PAGE    2



   



   -----------------------------------------------------------------------------
---------------



   Patient: HUE DU                      [id number]     (Continued)



   -----------------------------------------------------------------------------
---------------



   



   GROSS DESCRIPTION          (Continued)



   



   GROSS DESCRIPTION          (Continued)



   



   fragment, which is entirely submitted in one cassette.



   



   Signed __________(signature on file)___________ Dia Angela MD  [id number]



   



   -----------------------------------------------------------------------------
---------------



   



   



   



   



   



   



   



   



   



   



   



   



   



   



   



   



   



   



   



   



   



   



   



   



   



   



   



   



   



   



   



   



   



   



   



   



   ** END OF REPORT **



   



   * ML=Testing performed at Main Lab



   DEPARTMENT OF PATHOLOGY,  [address] [place] [address]



   Phone # [phone]      Fax #[phone]



   Duane Hopkins M.D. Director     [facility] # [id number]

 

 3  [facility] Severe Sepsis and Septic Shock Management Bundle Measure



   requires all lactic acids initially measuring >2.0 mmol/L be



   repeated.

 

 4  Specimen hemolyzed. Result may not be valid.

 

 5  Specimen hemolyzed. Result may not be valid.

 

 6  Specimen hemolyzed. Result may not be valid.

 

 7  Specimen hemolyzed. Result may not be valid.

 

 8  Specimen hemolyzed. Result may not be valid.

 

 9  Specimen hemolyzed. Result may not be valid.

 

 10  Specimen hemolyzed. Result may not be valid.

 

 11  Specimen hemolyzed. Result may not be valid.

 

 12  Specimen hemolyzed. Result may not be valid.

 

 13  *******Because ethnic data is not always readily available,



   this report includes an eGFR for both -Americans and



   non- Americans.****



   The National Kidney Disease Education Program (NKDEP) does



   not endorse the use of the MDRD equation for patients that



   are not between the ages of 18 and 70, are pregnant, have



   extremes of body size, muscle mass, or nutritional status,



   or are non- or non-.



   According to the National Kidney Foundation, irrespective of



   diagnosis, the stage of the disease is based on the level of



   kidney function:



   Stage Description                      GFR(mL/min/1.73 m(2))



   1     Kidney damage with normal or decreased GFR       90



   2     Kidney damage with mild decrease in GFR          60-89



   3     Moderate decrease in GFR                         30-59



   4     Severe decrease in GFR                           15-29



   5     Kidney failure                       <15 (or dialysis)

 

 14  Unable to report test result due to hemolysis.

 

 15  Unable to report test result due to hemolysis.

 

 16  Specimen hemolyzed. Result may not be valid.

 

 17  Specimen hemolyzed. Result may not be valid.



   Acute inflammation:  >10.00

 

 18  Therapeutic target for the treatment of diabetes



   [place] patients is <7% HBA1C, and in selective



   patients <6.0%.Please refer to American Diabetes



   Association Diabetic care guidelines for further



   information.

 

 19  SEE RESULT BELOW



   -----------------------------------------------------------------------------
---------------



   Name:  HUE DU                     : 1955    Attend Dr: Arturo Laguna MD



   Acct:  [id number]  Unit: [id number]  AGE: 62            Location:  [place] [address]



   Re17                        SEX: F             Status:    ADM Fercho



   -----------------------------------------------------------------------------
---------------



   



   SPEC: [id number]         TIFFANY:   17-    [facility] DR: Blayne Huynh MD



   REQ:  [id number]              RECD:   



   STATUS: VIVI SOLITARIO DR: Alyson Nguyen MD



   _



   SOURCE: URINE          SPDESC:



   ORDERED:  Urine Culture



   



   -----------------------------------------------------------------------------
---------------



   Procedure                         Result                         Reported   
        Site



   -----------------------------------------------------------------------------
---------------



   Urine Culture  Final                                             17-
1230      ML



   



   No growth of clinically significant organisms



   



   -----------------------------------------------------------------------------
---------------



   * ML - MAIN LAB ([facility])



   .



   



   



   



   



   



   



   



   



   



   



   



   



   



   



   



   



   



   



   



   



   



   



   



   



   



   



   ** END OF REPORT **



   



   * ML=Testing performed at Main Lab



   DEPARTMENT OF PATHOLOGY,  [address] [place] [address]



   Phone # [phone]      Fax #[phone]



   Duane Hopkins M.D. Director     [facility] # [id number]

 

 20  SEE RESULT BELOW



   -----------------------------------------------------------------------------
---------------



   Name:  HUE DU DESIRE                     : 1955    Attend Dr: Alyson Nguyen MD



   Acct:  [id number]  Unit: [id number]  AGE: 61            Location:  [facility]



   Re17                        SEX: F             Status:    REG REF



   -----------------------------------------------------------------------------
---------------



   



   SPEC: [id number]         TIFFANY:       SUBM DR: Aylson Nguyen MD



   REQ:  [id number]              RECD:   1540



   STATUS: COMP



   _



   SOURCE: STOOL          SPDESC:



   ORDERED:  Stool Culture, Fecal Lactoferr, O P (Full)



   COMMENTS: DO O P FULL REGARDLESS



   



   Unable to perform Shiga Toxin testing.  Specimen collection



   requirements were not met.  Stool for Shiga Toxin testing



   must be received by the laboratory within 2 hours of



   collection or placed in [employer] transport medium.



   



   *please interpret stool culture result with caution*



   Stool specimen was not placed into appropriate transport



   medium within recommended time-frame.  Testing may be less



   Sensitive.



   



   -----------------------------------------------------------------------------
---------------



   Procedure                         Result                         Reported   
        Site



   -----------------------------------------------------------------------------
---------------



   Stool Culture  Final                                             17-
1347      ML



   



   Result                      No enteric pathogens isolated



   



   Testing for Salmonella, Shigella, Aeromonas, Plesiomonas,



   Yersinia and Campylobacter are included in a Stool Culture.



   



   Vibrio spp not routinely tested for in a stool culture.



   If testing is desired, please request specifically when



   placing test order.



   



   Sensitivities not routinely performed on stool isolates, as



   antibiotics may prolong the carriage rate of bacteria.



   Please contact the microbiology lab if sensitivities are



   required.



   



   Stool Specimen Description  Final                                17-
1745      ML



   Stool Color                 Brown



   Stool Form                  Semi-formed



   



   ** CONTINUED ON NEXT PAGE **



   



   * ML=Testing performed at [facility]



   DEPARTMENT OF PATHOLOGY,  [address] [place] [address]



   Phone # [phone]      Fax #[phone]



   Duane Hopkins M.D. Director     [facility] # [id number]



   



   



   



   -----------------------------------------------------------------------------
---------------



   Patient: HUE DU                      [id number]     (Continued)



   -----------------------------------------------------------------------------
---------------



   



   



   Specimen: [id number]    Collected: 17-  Received: 17-
    (Continued)



   



   -----------------------------------------------------------------------------
---------------



   Procedure                         Result                         Reported   
        Site



   -----------------------------------------------------------------------------
---------------



   Stool Specimen Description  Final   (continued)                  17-
1745



   Stool Consistency           Soft



   



   Shiga Toxin 1   2  Final                                         02/15/17-
1417      ML



   Test not performed



   



   Fecal Lactoferrin (Stool WBC)  Final                             17-
1828      ML



   Fecal Lactoferrin           Negative by Immunoassay



   



   TEST LIMITATIONS:



   



   Assay detects elevated levels of lactoferrin released from



   fecal leukocytes as a marker of intestinal inflammation. The



   test may not be appropriate in immunocompromised persons.



   Fecal samples from breast fed infants should not be used



   with this assay.



   



   O P: Giardia/Cryptospor Screen  Final                            02/15/17-
1022      ML



   



   Organism 1                     Neg Cryptosporidium/Giardia



   



   Giardia and cryptosporidium antigen testing performed by



   enzyme immunoassay.  The use of colonic washes, aspirates or



   other diluted sample types has not been established and



   could affect the performance of the assay. Stool samples



   contaminated with an oily or particulate base (eg. Barium,



   mineral oil etc.) could interfere with the test and are not



   recommended.



   



   Ova   Parasite Concen Full  Final                                02/15/17-
0919      ML



   Test not performed



   



   -----------------------------------------------------------------------------
---------------



   * ML - MAIN LAB ([facility])



   .



   



   



   



   



   ** END OF REPORT **



   



   * ML=Testing performed at Main Lab



   DEPARTMENT OF PATHOLOGY,  [address] [place] [address]



   Phone # [phone]      Fax #[phone]



   Duane Hopkins M.D. Director     [facility] # [id number]

 

 21  SEE RESULT BELOW



   -----------------------------------------------------------------------------
---------------



   Name:  HUE DU                     : 1955    Attend Dr: Alyson Nguyen MD



   Acct:  [id number]  Unit: [id number]  AGE: 61            Location:  [facility]



   Re17                        SEX: F             Status:    REG REF



   -----------------------------------------------------------------------------
---------------



   



   SPEC: [id number]         TIFFANY:       [facility] DR: Alyson Nguyen MD



   REQ:  [id number]              RECD:   4188



   STATUS: RES



   _



   SOURCE: STOOL          SPDESC:



   ORDERED:  Stool Culture, O P (Full)



   



   -----------------------------------------------------------------------------
---------------



   Procedure                         Result                         Reported   
        Site



   -----------------------------------------------------------------------------
---------------



   Stool Culture



   PENDING



   



   Shiga Toxin 1   2



   PENDING



   



   O P: Giardia/Cryptospor Screen  Final                            17-
1208      ML



   



   Organism 1                     Neg Cryptosporidium/Giardia



   



   Giardia and cryptosporidium antigen testing performed by



   enzyme immunoassay.  The use of colonic washes, aspirates or



   other diluted sample types has not been established and



   could affect the performance of the assay. Stool samples



   contaminated with an oily or particulate base (eg. Barium,



   mineral oil etc.) could interfere with the test and are not



   recommended.



   



   Ova   Parasite Concen Full  Final                                17-
1059      ML



   Test not performed



   



   -----------------------------------------------------------------------------
---------------



   * ML - MAIN LAB ([facility])



   .



   



   



   



   



   



   



   



   



   ** END OF REPORT **



   



   * ML=Testing performed at Main Lab



   DEPARTMENT OF PATHOLOGY,  [address] [place] [address]



   Phone # [phone]      Fax #[phone]



   Duane Hopkins M.D. Director     [facility] # [id number]

 

 22  SOURCE: STOOL



   PARASITIC EXAMINATION                                  FINAL



   No parasites seen.



   Cryptosporidium, Cyclospora, and microsporidia are not



   readily detected by this method.



   Single negative specimen does not rule out



   parasitic infection.



   Test Performed by:



   [address] [address]



   : Rishabh Conteh II, M.D., Ph.D.

 

 23  consistent w/ previous results

 

 24  consistent w/ previous results

 

 25  SEE RESULT BELOW



   -----------------------------------------------------------------------------
---------------



   Name:  HUE DU                     : 1955    Attend Dr: Veto Esquivel MD



   Acct:  [id number]  Unit: [id number]  AGE: 60            Location:  [facility]



   Re/15/15                        SEX: F             Status:    REG REF



   -----------------------------------------------------------------------------
---------------



   



   SPEC: [id number]         TIFFANY:   09/15/15-2    SUBM DR: Veto Esquivel MD



   REQ:  [id number]              RECD:   09/15/



   STATUS: VIVI SOLITARIO DR: Alyson Nguyen MD



   _



   SOURCE: GAS ANTRUM     SPDESC:



   ORDERED:  Clotest



   



   -----------------------------------------------------------------------------
---------------



   Procedure                         Result                         Verified   
        Site



   -----------------------------------------------------------------------------
---------------



   Clotest  Final                                                   09/16/15-
0752      ML



   Clotest                     Negative



   



   -----------------------------------------------------------------------------
---------------



   * ML - MAIN LAB ([facility])



   .



   



   



   



   



   



   



   



   



   



   



   



   



   



   



   



   



   



   



   



   



   



   



   



   



   



   



   



   ** END OF REPORT **



   



   * ML=Testing performed at Main Lab



   DEPARTMENT OF PATHOLOGY,  [address] [place] [address]



   Phone # [phone]      Fax #[phone]



   Duane Hopkins M.D. Director     [facility] # [id number]

 

 26  SEE RESULT BELOW



   -----------------------------------------------------------------------------
---------------



   Name:  HUE DU                     : 1955    Attend Dr: Veto Esquivel MD



   Acct:  [id number]  Unit: [id number]  AGE: 60            Location:  [facility]



   Re/15/15                        SEX: F             Status:    REG REF



   -----------------------------------------------------------------------------
---------------



   



   SPEC: [id number]             TIFFANY: 09/15/      [facility] DR: Veto Esquivel MD



   REQ:  [id number]             RECD: 09/15/



   STATUS: ALYSON SOLITARIO DR: Alyson Nguyen MD



   _



   ORDERED:  LEVEL IV/2



   



   FINAL DIAGNOSIS



   



   



   1.   Stomach, antrum, biopsy:



   -- Body-type gastric mucosa with minimal superficial chronic inflammation.



   



   2.   Gastroesophageal junction, biopsy:



   -- Columnar-type mucosa with chronic inflammation and reactive epithelial 
change.



   -- Negative for intestinal metaplasia and dysplasia.



   



   



   



   



   CLINICAL HISTORY



   



   Black stool



   



   POST-OPERATIVE DIAGNOSIS



   



   Larynx - symmetric; esophagus - normal; EG at 40 irregular, biopsied x2; 
stomach - antral



   erosions - black spot at 2 o'clock, CLOtest and biopsy; duodenum - erythema 
in bulb, 2nd



   through 4th normal. Gastroduodenitis



   



   GROSS DESCRIPTION



   



   1.   The specimen is received in formalin labeled, Gastric Antral Biopsies, 
and consists of



   two tan irregular soft tissue fragments averaging 0.3 x 0.2 x 0.1 cm, which 
are submitted



   entirely in one cassette.



   



   2.   The specimen is received in formalin labeled, EG Junction Biopsy, and 
consists of two



   tan irregular soft tissue fragments measuring 0.3 x 0.3 x 0.2 cm and 0.5 x 
0.3 x 0.1 cm,



   which are submitted entirely in one cassette.



   



   Signed __________(signature on file)___________ Dia Angela MD 09/
17/15 1248



   



   -----------------------------------------------------------------------------
---------------



   



   ** END OF REPORT **



   



   * ML=Testing performed at Main Lab



   DEPARTMENT OF PATHOLOGY,  [address] [place] [address]



   Phone # [phone]      Fax #[phone]



   Duane Hopkins M.D. Director     [facility] # [id number]

 

 27  SEE RESULT BELOW



   -----------------------------------------------------------------------------
---------------



   Name:  HUE DU                     : 1955    Attend Dr: Veto Esquivel MD



   Acct:  [id number]  Unit: [id number]  AGE: 60            Location:  [facility]



   Re/15/15                        SEX: F             Status:    REG REF



   -----------------------------------------------------------------------------
---------------



   



   SPEC: [id number]         TIFFANY:   09/14/    SUBM DR: Veto Esquivel MD



   REQ:  [id number]              RECD:   09/15/



   STATUS: COMP             KASSI DR: Alyson Nguyen MD



   _



   SOURCE: STOOL          SPDESC:



   ORDERED:  Hemoccult



   



   -----------------------------------------------------------------------------
---------------



   Procedure                         Result                         Verified   
        Site



   -----------------------------------------------------------------------------
---------------



   Stool Occult Blood  Final                                        09/15/15-
1445      ML



   Stool Occult Blood          Negative



   



   -----------------------------------------------------------------------------
---------------



   * ML - MAIN LAB ([facility])



   .



   



   



   



   



   



   



   



   



   



   



   



   



   



   



   



   



   



   



   



   



   



   



   



   



   



   



   



   ** END OF REPORT **



   



   * ML=Testing performed at Main Lab



   DEPARTMENT OF PATHOLOGY,  [address] [place], [place] [address]



   Phone # [phone]      Fax #[phone]



   Duane Hopkins M.D. Director     [facility] # [id number]

 

 28  consistent w/ previous results

 

 29  RUN DATE: 14               [facility] **LIVE**       
         PAGE    1



   RUN TIME:              [address] [place], [place]   [address]



   Specimen Inquiry



   



   -----------------------------------------------------------------------------
---------------



   Name:  HUE DU                     : 1955    Attend Dr: Veot Esquivel MD



   Acct:  [id number]  Unit: [id number]  AGE: 59            Location:  [facility]



   Re14                        SEX: F             Status:    REG REF



   -----------------------------------------------------------------------------
---------------



   



   SPEC: [id number]             TIFFANY: 14-          SUBM DR: Veto Esquivel MD



   REQ:  [id number]             RECD: 1156



   STATUS: ALYSON SOLITARIO DR: Alyson Nguyen MD



   _



   ORDERED:  LEVEL IV



   



   FINAL DIAGNOSIS



   



   Colon, ileocecal valve, biopsy:



   Hyperplastic polyp.



   



   



   



   



   



   



   CLINICAL HISTORY



   



   Screening colonoscopy; personal history of polyps. Usual bowel habits - 
every day, 5-6 days



   per week.



   



   POST-OPERATIVE DIAGNOSIS



   



   Screening colonoscopy to cecum with ease; 1 colon polyp, sessile. Follow up 
- 3 years.



   



   GROSS DESCRIPTION



   



   The specimen is received in formalin labeled Hue Du, Ileal Cecal Polyp 
and consists of



   two tan-pink irregular to polypoid soft tissue fragments measuring 0.4 x 0.2 
x 0.2 cm. and



   0.9 x 0.5 x 0.5 cm. The larger tissue is inked, bisected, and the specimen 
is submitted



   entirely in one cassette.



   



   Signed __________(signature on file)___________ Dia Angela MD  1312



   



   -----------------------------------------------------------------------------
---------------



   



   



   



   



   



   



   



   



   ** END OF REPORT **



   



   * ML=Testing performed at Main Lab



   DEPARTMENT OF PATHOLOGY,  [facility] [employer] [place] [address]



   Phone # [phone]      Fax #[phone]



   Duane Hopkins M.D. Director     [facility] # [id number]

 

 30  RUN DATE: 05/15/13               [facility] **LIVE**       
         PAGE    1



   RUN TIME: 1502             [facility] [employer] [place], [place]   [address]



   Specimen Inquiry



   



   -----------------------------------------------------------------------------
---------------



   Name:  HUE DU                     : 1955    Attend Dr: Veto Esquivel MD



   Acct:  [id number]  Unit: [id number]  AGE: 58            Location:  [facility]



   Re13                        SEX: F             Status:    REG REF



   -----------------------------------------------------------------------------
---------------



   



   SPEC: [id number]             TIFFANY: 13-          SUBM DR: Veto Esquivel MD



   REQ:  [id number]             RECD: 



   STATUS: AYLSON SOLITARIO DR: Alyson Nguyen MD



   _



   ORDERED:  LEVEL IV/4



   



   FINAL DIAGNOSIS



   



   



   1. Colon, ileocecal polyp, biopsy:



   A.   Tubular adenoma.



   B.   No high grade dysplasia or malignancy.



   



   2. Colon, hepatic flexure, biopsy:



   Inflamed hyperplastic polyp with focal ulceration.



   



   3. Rectosigmoid polyp at 17 cm., biopsy:



   Inflamed hyperplastic polyp with focal ulceration.



   



   4. Colon, rectosigmoid biopsy:



   A.   Tubular adenoma.



   B.   No high grade dysplasia or malignancy.



   



   



   



   



   CLINICAL HISTORY



   



   Usual bowel habit - every day with no blood. Past surgical history - 
gallbladder, .



   Colorectal cancer - paternal aunt, uncle, paternal grandfather. Personal 
history of polyps



   



   POST-OPERATIVE DIAGNOSIS



   



   Four colon polyps



   



   GROSS DESCRIPTION



   



   1) The specimen is received in formalin labeled Hue Du, Ileocecal 
Polyp Biopsy and



   consists of multiple, tan, soft tissue fragments measuring 0.7 x 0.3 x 0.1 
cm.  Submitted



   entirely, one cassette.



   



   2) The specimen is received in formalin labeled Hue Du, Hepatic 
Flexure Biopsy and



   consists of a tan, soft tissue fragment measuring 0.4 x 0.3 x 0.3 cm.  
Submitted entirely,



   



   ** CONTINUED ON NEXT PAGE **



   



   * ML=Testing performed at Main Lab



   DEPARTMENT OF PATHOLOGY,  [facility] [employer] [place], [place] [address]



   Phone # [phone]      Fax #[phone]



   Duane Hopkins M.D. Director     [place] [employer]  #[id number]



   



   



   



   RUN DATE: 05/15/13               [facility] **LIVE**         
       PAGE    2



   RUN TIME: 1502             [facility] [employer] [place], [place]   [address]



   Specimen Inquiry



   



   -----------------------------------------------------------------------------
---------------



   Patient: HUE DU                      [id number]     (Continued)



   -----------------------------------------------------------------------------
---------------



   



   GROSS DESCRIPTION          (Continued)



   



   GROSS DESCRIPTION          (Continued)



   



   one cassette.



   



   3) The specimen is received in formalin labeled Hue Du, Rectosigmoid 
Polyp at 17 cm.



   and consists of multiple, tan-pink, polypoid fragment measuring 0.7 x 0.7 x 
0.4 cm.



   Submitted entirely, one cassette.



   



   4) The specimen is received in formalin labeled Hue Du, Rectosigmoid 
Polyp at 11 cm.



   and consists of a polypoid mass measuring 0.6 x 0.5 x 0.3 cm.  Submitted 
entirely, one



   cassette.



   



   1)



   



   Signed __________(signature on file)___________ Duane Hopkins MD 05/15/
13 1502



   



   -----------------------------------------------------------------------------
---------------



   



   



   



   



   



   



   



   



   



   



   



   



   



   



   



   



   



   



   



   



   



   



   



   



   



   



   ** END OF REPORT **



   



   * ML=Testing performed at Main Lab



   DEPARTMENT OF PATHOLOGY,  [address] [place] [address]



   Phone # [phone]      Fax #[phone]



   Duane Hopkins M.D. Director     [place] [employer]  #[id number]

 

 31  ---------------------------------------------------------------------------
----



   -------------



   



   RUN DATE: 08/15/12               [facility] **LIVE**



   PAGE 1



   RUN TIME: 1455                            Specimen Inquiry



   RUN USER: INTERFACE



   -----------------------------------------------------------------------------
--



   -------------



   



   Name: RICKDONAVANHUE                     Acct#: [id number]      Status: REG REF



   Re12



   Age/Sex: 57/F                         Unit#: [id number]       Location: [facility].O.B. : [birth year]



   -----------------------------------------------------------------------------
--



   -------------



   



   



   Specimen: [id number]    SOUT  Spec Date:    Subm Dr: Alyson ly MD



   Spec Type: SURGICAL P         Received:     Copies to:



   



   



   SPECIMEN



   



   1) LEFT BACK  2) MID BACK  3) RIGHT BREAST  4) RIGHT AXILLA



   



   HISTORY



   



   CLINICAL INFORMATION:    No history given



   



   



   



   GROSS DESCRIPTION



   



   1) The specimen is received in formalin labelled Hue Du, and consists



   of a fragment of white tissue measuring 0.8 x 0.8 x 0.4 cm.  Submitted



   entirely, one cassette labelled 1.



   



   2) The specimen is received in formalin labelled Hue Du, Mid Back, and



   consists of a fragment of white tissue measuring 0.8 x 0.8 x 0.4 cm.



   Submitted entirely, one cassette labelled 2.



   



   3) The specimen is received in formalin labelled Hue Du, Right Breast,



   and consists of a fragment of white tissue measuring 0.5 x 0.5 x 0.4 cm.



   Submitted entirely, one cassette labelled 3.



   



   4) The specimen is received in formalin labelled Hue Du, Right Axilla,



   and consists of one fragment of brown tissue measuring 0.5 x 0.5 x 0.4 cm.



   Submitted entirely, one cassette labelled 4.



   



   ******DIAGNOSIS******



   



   1) Skin, left back, excision:



   Acrochordon.



   



   2) Skin, mid back, excision:



   Acrochordon.



   



   3) Skin, right breast, excision:



   Intradermal melanocytic nevus with prominent neurotized features.



   



   4) Skin, right axilla, excision:



   Intradermal melanocytic nevus with prominent neurotized features.



   



   Signed Electronically by: WILBER MOREAU 08/15/12 1455



   -----------------------------------------------------------------------------
--



   -------------



   



   



   DEPARTMENT OF PATHOLOGY, [address] [place] [address]



   Phone #[phone]   Fax #[phone]   [facility] Permit #[id number]



   010



   Duane Hopkins M.D. Director   Wilber Moreau M.D. Assistant Dir



   remy



   -----------------------------------------------------------------------------
--



   -------------

 

 32  RESULT TAMIR'D

 

 33  Anion gap measurement may be of limited value in the



   presence of any alkalosis, especially in a combined acid



   base disorder.



   .

 

 34  A metabolite of Naproxen, O-desmethylnaproxen, has been



   shown to interfere with the Jendrassik-Radha method for



   measuring total bilirubin.  Samples from patients who have



   taken Naproxen have shown spurious elevation in total



   bilirubin levels.

 

 35  *******Because ethnic data is not always readily available,



   this report includes an eGFR for both -Americans and



   non- Americans.****



   The National Kidney Disease Education Program (NKDEP) does



   not endorse the use of the MDRD equation for patients that



   are not between the ages of 18 and 70, are pregnant, have



   extremes of body size, muscle mass, or nutritional status,



   or are non- or non-.



   According to the National Kidney Foundation, irrespective of



   diagnosis, the stage of the disease is based on the level of



   kidney function:



   Stage Description                      GFR(mL/min/1.73 m(2))



   1     Kidney damage with normal or decreased GFR       90



   2     Kidney damage with mild decrease in GFR          60-89



   3     Moderate decrease in GFR                         30-59



   4     Severe decrease in GFR                           15-29



   5     Kidney failure                       <15 (or dialysis)

 

 36  AA 12

 

 37  PREADMISSION TESTING SAMPLES FOR BLOOD BANK WILL BE HELD FOR



   14 DAYS FROM THE DATE OF COLLECTION *IF* THE FOLLOWING



   CRITERIA ARE MET:



   



   1) THE PATIENT HAS *NOT* BEEN PREGNANT IN THE LAST 3 MONTHS.



   2) THE PATIENT HAS *NOT* BEEN TRANSFUSED IN THE LAST 3



   MONTHS.



   ============================================================



   PREADMISSION TESTING SAMPLES WILL *NOT* BE HELD FOR 14 DAYS



   FROM PATIENTS WHO IN THE LAST 3 MONTHS:



   



   1) HAVE BEEN PREGNANT



   2) HAVE BEEN TRANSFUSED



   



   THESE PATIENTS *MUST* BE COLLECTED WITHIN 3 DAYS OF THE



   SURGERY DATE.

 

 38  Anion gap measurement may be of limited value in the



   presence of any alkalosis, especially in a combined acid



   base disorder.



   .

 

 39  *******Because ethnic data is not always readily available,



   this report includes an eGFR for both -Americans and



   non- Americans.****



   The National Kidney Disease Education Program (NKDEP) does



   not endorse the use of the MDRD equation for patients that



   are not between the ages of 18 and 70, are pregnant, have



   extremes of body size, muscle mass, or nutritional status,



   or are non- or non-.



   According to the National Kidney Foundation, irrespective of



   diagnosis, the stage of the disease is based on the level of



   kidney function:



   Stage Description                      GFR(mL/min/1.73 m(2))



   1     Kidney damage with normal or decreased GFR       90



   2     Kidney damage with mild decrease in GFR          60-89



   3     Moderate decrease in GFR                         30-59



   4     Severe decrease in GFR                           15-29



   5     Kidney failure                       <15 (or dialysis)

 

 40  ---------------------------------------------------------------------------
-----------------



   RUN DATE: 12               [facility] NMI **LIVE**         
       PAGE 1



   RUN TIME: 903                            Specimen Inquiry



   RUN USER: INTERFACE



   -----------------------------------------------------------------------------
---------------



   Name: HUE DU                     Accadeline#: [id number]      Status: REG REF  
   Re12



   Age/Sex: 57/F                         Unit#: [id number]       Location: [facility]



    : 53



   -----------------------------------------------------------------------------
---------------



   



   SPEC #: [id number]      TIFFANY:      STATUS:  COMP           
REQ #: [id number]



   RECD:      [facility] DR: Erika SPARKS,[place]



   SOURCE: URINE              ENTR:      OT DR: Wendy SPARKS,
Alyson DÍAZ



   [facility]:



   ORDERED:  URINE C   S



   QUERIES:  SPECIMEN DESCRIPTION: URINE, RANDOM



   ACT WKST: UR 12 #1



   -----------------------------------------------------------------------------
---------------



   Procedure                         Result                         Verified   
        Site



   -----------------------------------------------------------------------------
---------------



   > URINE CULTURE   SENSITIVI  Final                                 12-
903       ML



   SCANT NORMAL URETHRAL OR PERINEAL LEVI



   



   -----------------------------------------------------------------------------
---------------



   ML - [facility] Permit #[id number]



   [address] [place] [address]        Ph: [phone]  Fax: [phone]



   



   



   



   



   



   



   



   



   



   



   



   



   



   



   



   



   



   



   



   



   



   



   



   



   



   



   



   



   



   -----------------------------------------------------------------------------
---------------



   DEPARTMENT OF PATHOLOGY, [address] [place] [address]



   Phone #[phone]   Fax #[phone]   [facility] Permit #[id number]



   Duane Hopkins M.D. Director   Wilber Moreau M.D. 



   -----------------------------------------------------------------------------
---------------

 

 41  Anion gap measurement may be of limited value in the



   presence of any alkalosis, especially in a combined acid



   base disorder.



   .

 

 42  *******Because ethnic data is not always readily available,



   this report includes an eGFR for both -Americans and



   non- Americans.****



   The National Kidney Disease Education Program (NKDEP) does



   not endorse the use of the MDRD equation for patients that



   are not between the ages of 18 and 70, are pregnant, have



   extremes of body size, muscle mass, or nutritional status,



   or are non- or non-.



   According to the National Kidney Foundation, irrespective of



   diagnosis, the stage of the disease is based on the level of



   kidney function:



   Stage Description                      GFR(mL/min/1.73 m(2))



   1     Kidney damage with normal or decreased GFR       90



   2     Kidney damage with mild decrease in GFR          60-89



   3     Moderate decrease in GFR                         30-59



   4     Severe decrease in GFR                           15-29



   5     Kidney failure                       <15 (or dialysis)

 

 43  Recommended INR for Patients



   on Oral Anticoagulants



   Prophylaxis                       2.0 - 3.0



   Treatment of thrombosis           2.0 - 3.0



   Prevention of embolism            2.0 - 3.0



   Prevention of embolism from



   prosthetic heart valves         2.5 - 3.5

 

 44  DIAGNOSIS,TREATMENT,AND THERAPY MUST BE BASED ON THE INR



   VALUE ALONE.

 

 45  result reckd'

 

 46  RESULT TAMIR'D

 

 47  RESULT TAMIR'D







Procedures







 Date  CPT Code  Description  Status  Comment

 

 2018  39274  Remove Skin Tags Up To 15  Completed  

 

 2017  54894  Finger Or Heel Stick  Completed  

 

 2017    Colonoscopy  Completed  

 

 2017    Diabetic Retinal Eye Exam  Completed  Dr Coronado.  No Diabetes



         retinopathy

 

 2016  84139  Finger Or Heel Stick  Completed  

 

 2016  49775  Finger Or Heel Stick  Completed  

 

 2016  15408  Pulse Oximetry  Completed  

 

 2015  93841  Finger Or Heel Stick  Completed  

 

 2015  77709  Finger Or Heel Stick  Completed  

 

 2014  47866  Finger Or Heel Stick  Completed  

 

 2014    Colonoscopy  Completed  

 

 2013    Colonoscopy  Completed  

 

 2013    Mammogram  Completed  

 

 2013  87678  Excise Benign Lesion 1.1-2CM  Completed  



     Trunk/Arm/Leg    

 

 2013  48624  Biopsy Skin Lesion Single  Completed  

 

 04/15/2013    Mammogram  Completed  can't  take time off



         from work.

 

 04/15/2013  14291  Finger Or Heel Stick  Completed  

 

 2013  14413  Finger Or Heel Stick  Completed  

 

 08/10/2012  04947  Shave Skin Lesion   <.6CM  Completed  



     Trunk/Arm/Leg    

 

 08/10/2012  51444  Remove Skin Tags Up To 15  Completed  

 

 08/10/2012  97117  Shave Skin Lesion   <.6CM  Completed  



     Trunk/Arm/Leg    

 

 2012  61339  Finger Or Heel Stick  Completed  

 

 2012  57726  Finger Or Heel Stick  Completed  

 

 2012  20468  Finger Or Heel Stick  Completed  

 

 2012  75607  Finger Or Heel Stick  Completed  

 

 2012  54669  Finger Or Heel Stick  Completed  

 

 2012  97643  Finger Or Heel Stick  Completed  

 

 2012  36646  Finger Or Heel Stick  Completed  

 

 2012  62482  Finger Or Heel Stick  Completed  

 

 2012  58669  Finger Or Heel Stick  Completed  

 

 2012  40471  Finger Or Heel Stick  Completed  

 

 2012  80561  Finger Or Heel Stick  Completed  

 

 2012  17608  Electrocardiogram Complete  Completed  

 

 2011  35080  Electrocardiogram Complete  Completed  







Encounters







 Type  Date  Location  Provider  CPT E/M  Dx

 

 Office Visit  2018  1:00p  Main Office  Alyson Nguyen M.D.  [address]  
Z01.818









 M17.12

 

 E11.65

 

 I10









 Office Visit  2018  3:20p  [facility] Office  Alyson Nguyen M.D.  
[address]  K21.9









 L91.8

 

 S90.852A

 

 M25.561









 Office Visit  2017 11:30a  [place]  [address]  
R04.0

 

 Office Visit  2017  6:40p  [facility] Office  Alyson Nguyen M.D.  19041  
E11.65









 Z12.31









 Office Visit  2017  6:15p  [facility] Office  Hue Villarreal, [facility]  [address]  N39.0

 

 Office Visit  2017  5:40p  [facility] Office  Alyson Nguyen M.D.  [address]69  
E11.65









 R19.7

 

 F43.21









 Office Visit  2016 11:30a  [facility]  Dia Do, [facility]  
[address]  R05









 R19.7









 Office Visit  2016  3:30p  [facility] Office  Alyson Nguyen M.D.  69758  
M79.605









 E11.65









 Office Visit  2016  7:20p  [facility] Office  Alyson Nguyen M.D.  25146  
E11.65









 F43.21

 

 M76.32

 

 K58.0









 Office Visit  2016  6:30p  [facility] Office  Alyson Nguyen M.D.  [address]  
E11.65









 F43.21









 Office Visit  2016  7:30p  [facility] Office  Ale MackayNikunj  [address]  
J18.9

 

 Office Visit  12/10/2015  9:40a  [facility]  Alyson Nguyen M.D.  
[address]04  E11.65









 M79.641

 

 M79.642

 

 E55.9









 Office Visit  10/12/2015  4:20p  [facility] Office  Alyson Nguyen M.D.  19694  
E11.65









 M15.0

 

 I10









 Office Visit  2015  9:40a  [facility]  Alyson Nguyen M.D.  
11117  250.02









 578.1









 Office Visit  2015  8:30a  [facility] Office  Anaid AvilaMari arana-JACOB  [address]  
847.1

 

 Office Visit  2015  9:40a  [facility] Office  Alyson Nguyen M.D.  31910  
250.02









 381.89









 Office Visit  2014  5:40p  [facility] Office  Alyson Nguyen M.D.  38436  
250.00

 

 Office Visit  10/02/2014 11:30a  [facility]  Alyson Nguyen M.D.  
34722  250.02









 278.00

 

 305.1

 

 780.52









 Office Visit  2014  6:45p  [facility] Office  Rain Catherine, GIBSON  [address]95  250.02









 305.1

 

 782.9









 Office Visit  2014  6:15p  [facility] Office  Rain Catherine, GIBSON  68471  461.0









 466.0









 Office Visit  2013 10:50a  [facility]  Alyson Nguyen M.D.  
52943  250.02









 715.00

 

 787.91

 

 305.1

 

 v03.82









 Office Visit  2013 11:20a  [facility]  Alyson Nguyen M.D.  38017  
709.2









 V58.32









 Office Visit  2013  2:00p  [facility]  Alyson Nguyen M.D.  
59271  709.2

 

 Office Visit  04/15/2013  5:40p  [facility]  Alyson Nguyen M.D.  73221  
715.00









 250.02

 

 701.4

 

 v06.5









 Office Visit  2013  9:30a  [facility]  Hue VillarrealAMPARO  [address]143  
250.02









 250.00









 Office Visit  10/10/2012 11:45a  [facility] Office  Hue VillarrealAMPARO  15725  461.0

 

 Office Visit  08/10/2012  2:30p  [facility]  Alyson Nguyen M.D.  
67636  782.9









 701.9

 

 216.5









 Office Visit  2012 11:30a  [facility]  Alyson Nguyen M.D.  93948  
715.00









 453.40

 

 250.00

 

 782.9

 

 v58.61









 Office Visit  2012  9:20a  [facility]  Alyson Nguyen M.D.  80630  
250.00









 719.46

 

 453.40

 

 V58.61









 Office Visit  2012  9:00a  [facility]  Alyson Nguyen M.D.  
18893  453.40









 728.85

 

 715.00

 

 v58.61









 Office Visit  2012  5:40p  [facility]  Alyson Nguyen M.D.  72336  
719.46









 250.00

 

 401.1

 

 728.85









 Office Visit  2012  6:20p  [facility]  Alyson Nguyen M.D.  55516  
250.00

 

 Office Visit  2012 10:00a  [facility]  Alyson Nguyen M.D.  
85312  250.00

 

 Office Visit  2012 11:00a  [facility] Office  Alyson Nguyen M.D.  61981  
715.00









 272.2

 

 790.6

 

 V72.83









 Office Visit  2012  9:10a  [facility] Office  Alyson Nguyen M.D.  70823  
305.1

 

 Office Visit  2011 10:20a  [facility]  Alyson Nguyen M.D.  
99546  719.46









 V72.83







Plan of Care

Future Appointment(s):2018  9:00 am - Shahida Eid NP at Main 
[id number] - Shahida Eid NPE11.65 Type 2 diabetes mellitus with 
hyperglycemiaComments:Recommend yearly diabetic eye and foot exams, and check 
on blood pressure periodically. Goal blood sugar is less than 140 in the 
morning or A1c less than 7.I10 Essential (primary) hypertensionNew Medication:
Blood Pressure KitComments:The patient will continue to monitor blood pressure 
and let me know the blood pressure results if there are readings persistently 
above 140/80. Goal blood pressure is less than 140/80. Recommend low salt/
cardiac diet and routine exercise.    continue 150mg irbesartan, get new BP cuff
, monitor BP dailyand when symtommatic return in 1 week for NV BP check, to 
compare your cuff with ours.AllComments:~B_~U_Medication Management~b_~u_ 
Patient Understands medications she's taking?     Yes    No  Are there Barriers 
to Adherence?    Yes    No  Has the patient been asked about herbal supplements 
and therapies, and OTC meds?      Yes    No     ~B_~U_Care Plan~b_~u_1.  
Patient has been queried about patient's goals/preferences and functional/
lifestyle goals at relevant visits.  If relevant, describe: na2.  Treatment 
goals as explained to the patient: above3.  Are there barriers to meeting 
treatment goals?  Yes    No      If Yes, please describe:4.  Self-Management 
goals as described to the patient:  Yes     NoFollow up:As always, we strongly 
encourage a healthy diet and making physical activity a part of your every day 
life. If you have questions about how or where to start, please contact the 
office.

## 2018-08-02 NOTE — HP
HISTORY AND PHYSICAL:

 

DATE OF ADMISSION:  08/02/18

 

TIME OF ADMISSION:  5:45 p.m.

 

PRIMARY CARE PHYSICIAN:  Dr. Nguyen.

 

CHIEF COMPLAINT:  "Some kind of a spell."

 

HISTORY OF PRESENT ILLNESS:  This is a 63-year-old female with history of type 
2 diabetes, hypertension, and a recent left knee replacement, who presents to 
the emergency department with an episode that occurred at rest today that she 
describes as sweatiness and "feeling my heart race."  Other associated symptoms 
included shortness of breath and dizziness and on further questioning, she does 
admit to some chest pressure at that time.  She says that these spells have 
been occurring every day or every other day since June when she had her total 
knee arthroplasty.  They typically last approximately 20 minutes and today it 
was not going away, so she called her doctor, who recommended calling EMS.  At 
this time, in the emergency department, she felt back to normal and she has no 
complaints.  Her sister and her nephew are in the room with her and they have 
been present during one of these spells and they report that as a witness, she 
appeared just sweaty.

 

She denies any recent illness.  She has not had any fevers, headache, cough, 
cold, diarrhea, abdominal pain, hematochezia.

 

She has never taken her blood sugar during any of these episodes nor her blood 
pressure.  She has been taking her medications as prescribed and only takes 
pain medication at night if needed.  She does note that her left knee feels 
very hot and when her sister brought in a warm blanket today, she screamed when 
it touched her left knee.

 

PAST MEDICAL HISTORY:  Type 2 diabetes, hypertension, hyperlipidemia, obesity, 
GERD, osteoarthritis.

 

PAST SURGICAL HISTORY:  Right knee total arthroplasty, left knee total 
arthroplasty, and cholecystectomy.

 

SOCIAL HISTORY:  She lives with her nephew.  She quit smoking 4 years ago.  She 
denies alcohol or illicit drug use.

 

                               PHYSICAL EXAMINATION

 

GENERAL:  Alert, obese female, in no distress.

 

VITAL SIGNS:  Temperature 98.4, heart rate 101, respiratory rate 17, pulse ox 99
% on room air, blood pressure 156/74.

 

HEENT:  Pupils are 3 mm bilaterally and reactive to light.  She has no 
nystagmus. Oral mucosa is moist.  No pharyngeal exudates or erythema.

 

NECK:  No JVD.  No cervical or supraclavicular lymphadenopathy.

 

LUNGS:  Clear bilaterally.

 

CHEST:  Regular rate and rhythm.  No murmurs.  PMI is nondisplaced.

 

ABDOMEN:  Obese, soft, nontender, nondistended.  Méndez's sign is negative.  
She does have some right CVA tenderness.

 

EXTREMITIES:  No edema.  The left TKA incision is well healed and approximated 
without drainage, though it is slightly warm compared to the right knee.  Her 
distal pulses are 2+.  She has no ulcers on her feet.

 

NEUROLOGIC:  Strength is 5/5 in all extremities.  Her range of motion is good 
including in her knees.  She follows all commands appropriately and her 
coordination is intact.

 

DIAGNOSTIC STUDIES/LAB DATA:   hemoglobin 11.7, platelets 302.  Sodium 137, 
potassium 4.1, chloride 100, bicarb 23, anion gap 14, BUN 14, creatinine 0.76, 
glucose 145, lactic acid 4.2.  Troponin 0.05.

 

CTA:  No evidence of PE noted.  No evidence of thoracic aortic dissection noted.

 

Chest x-ray:  No evidence for acute disease.

 

EKG:  Normal sinus rhythm, normal axis, normal intervals.  No chamber 
hypertrophy. No Q waves.  No ST or T-wave changes.

 

ASSESSMENT AND PLAN:  This is a 63-year-old female with history of diabetes, 
hypertension, and a recent left knee replacement, who presents today with 1 
month of "spells" that include palpitations, shortness of breath, diaphoresis, 
chest pressure, and dizziness.  She presented to the emergency department today 
because this episode was not resolving on its own and she is found to have an 
elevated lactic acid and elevated troponin.

 

1.  "Spells."  The differential for these episodes is broad and at this time 
includes angina, anxiety, hypoglycemia, hypotension, infection.  I am going to 
start by adding on ESR and CRP to her labs, checking blood cultures, checking 
an A1c, checking an x-ray of her left knee, checking orthostatic vital signs, 
trending her troponins, monitoring her on telemetry, adding on a BNP, and 
repeating a lactic acid.  Her episodes typically occur at rest, so it is not 
usual for angina; however, it is possible that she would have an atypical 
presentation, still her EKG does not suggest acute coronary syndrome.

2.  Lactic acidosis.  She has no abdominal pain, no hematochezia; however, I am 
concerned about infection, so I am checking blood cultures as above and a UA.  
This also may be a reflection of her metformin; however, this would be rare 
with normal renal function.

3.  Gastroesophageal reflux disease.  Continue omeprazole and ranitidine.

4.  Hypertension.  Continue irbesartan.  However, she notes that her irbesartan 
was doubled 2 weeks ago and it is possible this is contributing to her symptoms 
as she is having periods of hypotension.  We will monitor her blood pressure on 
this new regimen.

5.  DVT prophylaxis.  Lovenox subcutaneous.

6.  Disposition.  Admit to observation status with a normal diet.

 

 

 

[id number]/CPS #: [id number]

DONG

## 2018-08-03 VITALS — SYSTOLIC BLOOD PRESSURE: 138 MMHG | DIASTOLIC BLOOD PRESSURE: 54 MMHG

## 2018-08-03 LAB — WBC UR QL AUTO: (no result)

## 2018-08-03 RX ADMIN — OXYCODONE HYDROCHLORIDE AND ACETAMINOPHEN PRN TAB: 5; 325 TABLET ORAL at 02:01

## 2018-08-03 RX ADMIN — INSULIN LISPRO SCH UNITS: 100 INJECTION, SOLUTION INTRAVENOUS; SUBCUTANEOUS at 13:05

## 2018-08-03 RX ADMIN — INSULIN LISPRO SCH: 100 INJECTION, SOLUTION INTRAVENOUS; SUBCUTANEOUS at 09:02

## 2018-08-03 RX ADMIN — OXYCODONE HYDROCHLORIDE AND ACETAMINOPHEN PRN TAB: 5; 325 TABLET ORAL at 13:05

## 2018-08-03 NOTE — RAD
HISTORY: dvt, status post knee replacement



COMPARISONS: None relevant



TECHNIQUE: Multiple transverse and longitudinal ultrasound images were obtained of the

left lower extremity from the level of the common femoral vein inferiorly through to the

infrapopliteal veins  using grayscale, color Doppler, and spectral Doppler imaging with

and without compression and with augmentation. Comparison images were obtained of the

contralateral common femoral vein.



FINDINGS:

VEINS: The venous system of the left lower extremity is compressible throughout its

course, with normal flow on color Doppler imaging and normal response to augmentation on

spectral Doppler imaging.



SOFT TISSUES: Unremarkable.



OTHER FINDINGS: None.



IMPRESSION: 

NO LEFT LOWER EXTREMITY DEEP VEIN THROMBOSIS

## 2018-08-03 NOTE — RAD
HISTORY: episodic numbness



COMPARISONS: None



TECHNIQUE: Multiple contiguous axial CT scans were obtained of the head without 

intravenous contrast. 



FINDINGS: 

HEMORRHAGE/INFARCT: There is no hemorrhage or acute infarct.

MASSES/SHIFT: There is no mass or shift.



EXTRA-AXIAL SPACES: There are no extra-axial fluid collections.

SULCI AND VENTRICLES: The sulci and ventricles are normal in size and position for the

patient's stated age.



CEREBRUM: There are no focal parenchymal abnormalities.

BRAINSTEM: There are no focal parenchymal abnormalities.

CEREBELLUM: There are no focal parenchymal abnormalities.



VESSELS: There is calcification of the cavernous segments of the internal carotid arteries

bilaterally.

PARANASAL SINUSES: The paranasal sinuses are clear.

ORBITS: The orbits are unremarkable.

BONES AND SOFT TISSUE: No bone or soft tissue abnormalities are noted.



OTHER: None



IMPRESSION: 

NO ACUTE INTRACRANIAL PATHOLOGY.

## 2018-08-03 NOTE — RAD
INDICATION:  Episodic numbness.



COMPARISON:  There are no prior studies available for comparison.



TECHNIQUE: Multiple grayscale, color and Doppler tracings of the common, internal and

external carotid and vertebral arteries were obtained. Stenosis estimations reflect

velocity criteria that it been correlated to angiographic stenosis calculations based on

the distal internal carotid diameter.



RIGHT CAROTID:

There is mild plaque within the right carotid bulb and proximal internal carotid artery..

The peak systolic velocity in the proximal right internal carotid artery is 85 cm/s and

the maximum end-diastolic velocity is 28 cm/s.  The peak systolic velocity in the distal

right common carotid artery is 101 cm/s and the maximum end-diastolic velocity is 34 cm/s.

 The internal to common carotid artery ratio is 0.9.  This would be consistent with a less

than 50% stenosis.



LEFT CAROTID:

There is mild hyperechoic plaque within the left carotid bulb and proximal internal

carotid artery.  The peak systolic velocity in the proximal left internal carotid artery

is 78 cm/s and the maximum end-diastolic velocity is 24 cm/s.  The peak systolic velocity

in the distal left common carotid artery is 83  cm/s and the maximum end-diastolic

velocity is 24 cm/s.   The internal to common carotid artery ratio is 0.9. This would be

consistent with a less than 50% stenosis.



VERTEBRALS:

There is antegrade flow in both vertebral arteries.



IMPRESSION:  THERE IS MILD PLAQUE PRESENT BILATERALLY WITHIN THE PROXIMAL INTERNAL CAROTID

ARTERIES. NO HEMODYNAMICALLY SIGNIFICANT STENOSIS IS SEEN.



CPT II Codes: 3100F

## 2018-08-03 NOTE — RAD
HISTORY: "spells" of sob and palpitations



COMPARISONS: None



TECHNIQUE: A 1 day stress/rest myocardial perfusion study was performed, with

pharmacologic stress. The stress portion was monitored by Dr. Hamlin. Gated SPECT

imaging was performed, without CT-based attenuation correction secondary to body habitus



DOSE:

Stress: Technetium 99m tetrofosmin, 25.3 millicuries, injected at 10:43 AM on August 03, 2018

Rest: Technetium 99m tetrofosmin, 10.7 millicuries, injected at 8:10 AM on August 03, 2018

Pharmacologic agent: Lexiscan



FINDINGS:



CARDIAC MONITORING: EKG criteria of ischemia with stress



EF: 65%

TID: 1.19



MOTION: Normal motion, with normal wall thickening.



PERFUSION: There are no fixed or reversible perfusion defects.



OTHER: None



IMPRESSION: NO FIXED OR REVERSIBLE PERFUSION DEFECTS.



ASSESSMENT: LOW RISK. Based on imaging criteria from ACC/AHA 2002. Guideline Update for

the Management of Patient's with Chronic Stable Angina, table 23. Noninvasive Risk

Stratification.

## 2018-08-04 NOTE — DS
CC:  Dr. Alyson Nguyen *

 

DISCHARGE SUMMARY:

 

DATE OF ADMISSION:  08/02/18

 

DATE OF DISCHARGE:  08/03/18

 

PRINCIPAL DISCHARGE DIAGNOSES:

1.  Panic disorder.

2.  Uncomplicated cystitis.

3.  Lactic acidosis.

 

SECONDARY DISCHARGE DIAGNOSES:

1.  Osteoarthritis status post recent left total knee arthroplasty.

2.  Type 2 diabetes.

3.  Hypertension.

4.  Hyperlipidemia.

5.  Obesity.

6.  Gastroesophageal reflux disease.

 

PHYSICAL EXAMINATION:  At the time of discharge, temperature 98.0, heart rate 86
, respiratory rate 16, pulse ox 100% on room air, blood pressure 138/74.  
General: Alert, obese female, in no distress.  HEENT:  Pupils are equal, round, 
and reactive to light.  No nystagmus is noted.  Oral mucosa is moist.  No 
pharyngeal exudates are noted.  Neck:  No JVP.  No cervical or supraclavicular 
lymphadenopathy.  Chest: Regular rate and rhythm.  No murmurs.  PMI 
nondisplaced.  Lungs:  Clear bilaterally.  Abdomen:  Obese, soft, nontender, 
nondistended.  No guarding, rebound, or rigidity.  Slight right CVA tenderness 
is noted.  Extremities:  No edema.  No erythema or ulcers.  Left knee incision 
is well healed and she has active and passive range of motion.  Distal pulses 
are 2+ bilaterally.

 

HOSPITAL COURSE BY PROBLEM:

1.  Panic attack.  Ms. Du presented to the emergency department with 
approximately 1 month of almost daily episodes of palpitations, diaphoresis, 
shortness of breath, and tingling.  Her neurologic and cardiac exams were 
unremarkable and further workup was pursued to rule out an organic neurological
, cardiac event.  While her troponin was very mildly elevated at 0.05, acute 
coronary syndrome was ruled out with a normal EKG and a negative stress test.  
In addition, a CT head was unremarkable.  Carotid ultrasound is pending at the 
time of discharge.  While an uncomplicated UTI was found, no evidence of 
systemic infection was noted.  Inflammatory markers were unremarkable and a 
knee x-ray was unremarkable as well as a CT angio, which was negative and a 
lower extremity Doppler, which was also negative.  Further questioning and 
discussion with Ms. Du revealed social stressors in her life, though she is 
not convinced that what she is experiencing are panic attacks.  I agree with 
her that further workup can be pursued as an outpatient with her primary care 
physician; however, I counseled her on the nature of panic attacks, mental 
illness and our ability to treat them effectively.  She and her sister agreed 
and also said that there is a strong family history of anxiety and panic.  She 
does have Ativan at home that she uses p.r.n. sleep at night and she agrees to 
try a 0.5 mg of Ativan next time she experiences 1 of these attacks.

2.  Lactic acidosis.  Her lactate level was elevated at 4.2 on admission and 
her anion gap was 14, which coincides with elevated lactate.  She had no other 
events of septic shock.  Therefore, I recommended discontinuing her metformin 
and continuing the glipizide only especially since her A1c is well controlled 
at 7.1.

3.  Recent left knee TKA.  She reported some concern about infection due to 
warmth at the site; however, she had excellent range of motion and her 
inflammatory markers were unremarkable.  In addition, a knee x-ray showed no 
evidence of periprosthetic lucency.

4.  GERD.  She was continued on omeprazole and ranitidine.

5.  Hypertension.  She was continued on irbesartan.  It was noted that her 
irbesartan dose was recently doubled, so there was some concern for hypotension 
causing her symptoms at admission; however, she was normotensive throughout 
this admission and her orthostatic vital signs were negative.

6.  Disposition.  Ms. Du is being discharged to home on 08/03/18 with her 
sister who I have reviewed all the findings of the test list.  She is 
instructed to come back to emergency department should she experience any 
further chest pain, shortness of breath, palpitations, faintness, or other 
unusual symptoms.

 

TIME SPENT:  An hour was spent discharging Ms. Du and over half of that time 
was spent face-to-face with her and her family.

 

 [id number] #: [id number]

[facility] CenterMYRIAM

## 2018-10-11 ENCOUNTER — HOSPITAL ENCOUNTER (EMERGENCY)
Dept: HOSPITAL 25 - ED | Age: 63
Discharge: HOME | End: 2018-10-11
Payer: MEDICARE

## 2018-10-11 VITALS — SYSTOLIC BLOOD PRESSURE: 142 MMHG | DIASTOLIC BLOOD PRESSURE: 79 MMHG
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
## 2018-10-11 DIAGNOSIS — S52.502A: Primary | ICD-10-CM

## 2018-10-11 DIAGNOSIS — E11.9: ICD-10-CM

## 2018-10-11 DIAGNOSIS — T14.8XXA: ICD-10-CM
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
## 2018-10-11 DIAGNOSIS — Z87.891: ICD-10-CM

## 2018-10-11 DIAGNOSIS — K21.9: ICD-10-CM

## 2018-10-11 PROCEDURE — 99282 EMERGENCY DEPT VISIT SF MDM: CPT

## 2018-10-11 NOTE — RAD
HISTORY: Fall, left leg pain 



COMPARISONS: Left knee dated August 02, 2018 



VIEWS: 2 , Frontal and lateral views of the left foreleg 



FINDINGS:



BONE DENSITY: There is diffuse osteopenia.

BONES: There is no displaced fracture. The patient is status post left knee arthroplasty. 

JOINTS: There is no arthropathy.

ALIGNMENT: There is no dislocation. 

SOFT TISSUES: Unremarkable.



OTHER FINDINGS: None.



IMPRESSION: 

1.  OSTEOPENIA.

2.  STATUS POST LEFT KNEE ARTHROPLASTY.

3.  NO ACUTE OSSEOUS INJURY. IF SYMPTOMS PERSIST, RECOMMEND REPEAT IMAGING

## 2018-10-11 NOTE — XMS REPORT
Hue Du

 Created on:2018



Patient:Hue Du

Sex:Female

:1955

External Reference #:[id number]





Demographics







 Address  [address] [place] [address]

 

 Home Phone  [phone]

 

 Mobile Phone  [phone]

 

 Email Address  [email or web address]

 

 Preferred Language  English

 

 Marital Status  Not  Or 

 

 Spiritism Affiliation  Unknown

 

 Race  White

 

 Ethnic Group  Not  Or 









Author







 Organization  [employer]

 

 Address  [address]



   [place] [address]

 

 Phone  [phone]









Support







 Name  Relationship  Address  Phone

 

 Steven Fofana  Unavailable  Unavailable  [phone]









Care Team Providers







 Name  Role  Phone

 

 Alyson Nguyen MD  Primary Care Physician  Unavailable









Payers







 Type  Date  Identification Numbers  Payment Provider  Subscriber

 

 Health Maintenance    Policy Number:  Medicare Blue Ppo  Hue Du



 Organization (HMO)    [id number]    









 PayID:   [address] [id number]









 [place] [address]









 Commercial  Effective:  Policy Number:  [employer]  Hue Du



   2011  [id number]    









 Expires: 2018  PayID: [id number]  [address] [id number]









 [place] [address]







Problems







 Description

 

 No Information







Family History







 Date  Family Member(s)  Problem(s)  Comments

 

   General  Hypertension  







Social History







 Type  Date  Description  Comments

 

 Marital Status      

 

 Lives With    nephew  

 

 Occupation    self emplyed  

 

 Cigarette Use    Quit 3 Years Ago  

 

 ETOH Use    Occasionally consumes alcohol  

 

 Smoking    Patient is a former smoker  Quit in 2016

 

 Recreational Drug Use    Denies Drug Use  

 

 Daily Caffeine    Consumes on average 1 pot of  



     regular coffee per day  

 

 Daily Caffeine    consumes chocolate  



     occasionally  

 

 Exercise Type/Frequency    Exercises sporadically  

 

 Exercise Type/Frequency    Knee excercise  strenghting

 

 General Hx Text      Do you follow a special



       diet : no regular diet no



       food avoid Do you have



       problems snoring, daytime



       fatigue:  yes daytime



       fatigue







Allergies, Adverse Reactions, Alerts







 Date  Description  Reaction  Status  Severity  Comments

 

 2017  Morphine    active    

 

 2017  Penicillin    active    







Medications







 Medication  Date  Status  Form  Strength  Qnty  SIG  Indications  Ordering



                 Provider

 

 Celecoxib  08/15/  Active  Capsules  200mg  30cap  Take 1    Dirk



           s  tab twice    Erika,



             a day as    M.D.



             needed    



             pain    

 

 Percocet  /  Active  Tablets  5-325mg  90tab  1-2 by    Dirk



           s  mouth    Erika,



             every 4-6    M.D.



             hours as    



             needed    



             pain.    



             generic    



             ok    

 

 Cyclobenzaprine  /  Active  Tablets  10mg  90tab  take 1    Dirk



 HCL          s  tab by    Erika,



             mouth 2-3    M.D.



             times a    



             day as    



             needed    

 

 Colace  /  Active  Capsules  100mg  90cap  1 tab by    Dirk



           s  mouth 2-3    Erika,



             times a    M.D.



             day as    



             needed    

 

 Ondansetron HCL  /  Active  Tablets  4mg  30tab  1 tablet    Dirk



           s  by mouth    Erika,



             q6 hours    M.D.



             as needed    



             nausea    

 

 Ranitidine HCL  /  Active  Tablets  300mg    take 1    Wendy



             tablet by    Alyson talley at    MD ARJUN



             bedtime    

 

 Glipizide ER  /  Active  Tablets ER  5mg    1 tablet    Wendy2018    24HR      po daily    Alyson DÍAZ MD



             morning (    



              med    



             change    



             2017    



             decrease    



             2 tab Am)    

 

 Metformin HCL  /  Active  Tablets  1000mg    1 by    Wendy



             mouth    Alyson



             twice a    MD ARJUN



             day    

 

 Pravastatin  /  Active  Tablets  10mg    1 tablet    Wendy,



 Sodium            po daily    Alyson



             evening    MD ARJUN

 

 Irbesartan  10/16/  Active  Tablets  75mg    1 by    Wendy



             mouth    Alyson



             every day    MD ARJUN

 

 Fish Oil Omega-3  /  Active  Capsules  1000mg    1 cap po    Unknown



   0000          daily    

 

 Odorless Garlic  /  Active    0.75mg    1 cap po    Unknown



   0000          daily    

 

 Centrum Silver  /  Active  Tablets      1 by    Unknown



   0000          mouth    



             every day    

 

                 

 

 Xarelto  /  Hx  Tablets  10mg  30tab  Take one    Dirk



    -        s  tablet by    Erika,



   /          mouth    M.D.



             daily    



             after    



             surgery.    

 

 Percocet  /  Hx  Tablets  5-325mg  60tab  1 po    Dirk



   2012  q4-6h prn    Erika,



   /          pain    M.D.



   2017              

 

 Bactrim DS  /  Hx  Tablets  800-160mg  14tab  1 po bid    Dirk



   2012  for 7    Erika,



   /          days    M.D.



   2017              

 

 Coumadin  /  Hx  Tablets  2.5mg  90tab  take 1-3    Dirk



    -        s  as    Erika,



   /          directed    M.D.



   2017          at 5pm    



             daily    

 

 Percocet  /  Hx  Tablets  5-325mg  60tab  1-2 tabs    Dirk



    -        s  po q4-6    Erika,



   /          prn pain    M.D.



   2017              

 

 Centrum Silver  /  Hx  Tablets  50+Women        Unknown



 50+Women  0000 -              



   2018              







Vital Signs







 Date  Vital  Result  Comment

 

 2018  Height  64 inches  5'4"









 Weight  271.00 lb  

 

 BP Systolic  144 mmHg  

 

 BP Diastolic  80 mmHg  

 

 Respiratory Rate  20 /min  

 

 Body Temperature  98.0 F  

 

 Pain Level  0  

 

 BMI (Body Mass Index)  46.5 kg/m2  









 08/15/2018  Height  64 inches  5'4"









 Weight  271.00 lb  

 

 BP Systolic  130 mmHg  

 

 BP Diastolic  86 mmHg  

 

 Respiratory Rate  18 /min  

 

 Body Temperature  97.7 F  

 

 Pain Level  7  

 

 BMI (Body Mass Index)  46.5 kg/m2  









 2018  Height  64 inches  5'4"









 Weight  271.00 lb  

 

 BP Systolic  134 mmHg  

 

 BP Diastolic  88 mmHg  

 

 Respiratory Rate  20 /min  

 

 Body Temperature  97.8 F  

 

 Pain Level  2  

 

 BMI (Body Mass Index)  46.5 kg/m2  









 2018  Height  64 inches  5'4"









 Weight  271.00 lb  

 

 BP Systolic  126 mmHg  

 

 BP Diastolic  80 mmHg  

 

 Respiratory Rate  20 /min  

 

 Body Temperature  97.1 F  

 

 Pain Level  8  

 

 BMI (Body Mass Index)  46.5 kg/m2  









 2018  Height  64 inches  5'4"









 Weight  274.00 lb  with sandals

 

 Heart Rate  82 /min  

 

 BP Systolic  110 mmHg  Rue lg cuff

 

 BP Diastolic  50 mmHg  Rue lg cuff

 

 BP Systolic Sitting  128 mmHg  Lue lg cuff

 

 BP Diastolic Sitting  80 mmHg  Lue lg cuff

 

 BP Systolic Standing  142 mmHg  Lue lg cuff

 

 BP Diastolic Standing  80 mmHg  Lue lg cuff

 

 Respiratory Rate  18 /min  

 

 BMI (Body Mass Index)  47.0 kg/m2  









 2018  Heart Rate  88 /min  









 BP Systolic  140 mmHg  

 

 BP Diastolic  96 mmHg  

 

 Respiratory Rate  16 /min  

 

 Body Temperature  97.9 F  

 

 Pain Level  5  









 2017  Height  63 inches  5'3"









 Weight  250.00 lb  

 

 Heart Rate  91 /min  

 

 BP Systolic  139 mmHg  

 

 BP Diastolic  76 mmHg  

 

 Body Temperature  98.0 F  

 

 BMI (Body Mass Index)  44.3 kg/m2  







Results







 Test  Date  Test  Result  H/L  Range  Note

 

 Type & Screen  2018  Patient Blood Type  A Positive      1









 Antibody Screen  NEGATIVE      1









 Urinalysis Profile  2018  Urine Color  Yellow      









 Urine Appearance  Cloudy      

 

 Urine Specific Gravity  1.008  Low  1.010-1.030  

 

 Urine pH  6.0    5-9  

 

 Urine Urobilinogen  Negative    Negative  

 

 Urine Ketones  Negative    Negative  

 

 Urine Protein  Negative    Negative  

 

 Urine Leukocytes  3+    Negative  

 

 Urine Blood  Negative    Negative  

 

 Urine Nitrite  Negative    Negative  

 

 Urine Bilirubin  Negative    Negative  

 

 Urine Glucose  Negative    Negative  

 

 Urine White Blood Cell  3+(>20/hpf)    Absent  

 

 Urine Red Blood Cell  Absent    Absent  

 

 Urine Bacteria  Absent    Absent  

 

 Urine Squamous Epithelial Cell  Present    Absent  

 

 Urine Renal Epithelial Cells  Present    Absent  









 Inr/Protime  2018  Inr  0.88    0.77-1.02  

 

 Laboratory test finding  2018  Partial Thrombo Time  27.8 seconds    26.0
-36.3  



     PTT        

 

 CBC Auto Diff  2018  White Blood Count  9.0 10^3/uL    3.5-10.8  









 Red Blood Count  4.33 10^6/uL    4.0-5.4  

 

 Hemoglobin  12.5 g/dL    12.0-16.0  

 

 Hematocrit  38 %    35-47  

 

 Mean Corpuscular Volume  87 fL    80-97  

 

 Mean Corpuscular Hemoglobin  29 pg    27-31  

 

 Mean Corpuscular HGB Conc  33 g/dL    31-36  

 

 Red Cell Distribution Width  13 %    10.5-15  

 

 Platelet Count  286 10^3/uL    150-450  

 

 Mean Platelet Volume  8.3 um3    7.4-10.4  

 

 Abs Neutrophils  5.6 10^3/uL    1.5-7.7  

 

 Abs Lymphocytes  2.4 10^3/uL    1.0-4.8  

 

 Abs Monocytes  0.7 10^3/uL    0-0.8  

 

 Abs Eosinophils  0.3 10^3/uL    0-0.6  

 

 Abs Basophils  0 10^3/uL    0-0.2  

 

 Abs Nucleated RBC  0 10^3/uL      

 

 Granulocyte %  61.8 %    38-83  

 

 Lymphocyte %  27.0 %    25-47  

 

 Monocyte %  7.3 %  High  0-7  

 

 Eosinophil %  3.4 %    0-6  

 

 Basophil %  0.5 %    0-2  

 

 Nucleated Red Blood Cells %  0      









 Urine Culture And Sensitivities  2018  Urine Culture  SEE RESULT BELOW  
    2

 

 Type & Screen  2018  Patient Blood Type  A Positive      









 Antibody Screen  NEGATIVE      









 Comp Metabolic Panel  2018  Sodium  138 mmol/L  Low  139-145  









 Potassium  4.4 mmol/L    3.5-5.0  

 

 Chloride  100 mmol/L  Low  101-111  

 

 Co2 Carbon Dioxide  31 mmol/L    22-32  

 

 Anion Gap  7 mmol/L    2-11  

 

 Glucose  112 mg/dL  High    

 

 Blood Urea Nitrogen  13 mg/dL    6-24  

 

 Creatinine  0.76 mg/dL    0.51-0.95  

 

 BUN/Creatinine Ratio  17.1    8-20  

 

 Calcium  9.6 mg/dL    8.6-10.3  

 

 Total Protein  6.8 g/dL    6.4-8.9  

 

 Albumin  4.1 g/dL    3.2-5.2  

 

 Globulin  2.7 g/dL    2-4  

 

 Albumin/Globulin Ratio  1.5    1-3  

 

 Total Bilirubin  0.70 mg/dL    0.2-1.0  

 

 Alkaline Phosphatase  64 U/L      

 

 Alt  19 U/L    7-52  

 

 Ast  12 U/L  Low  13-39  

 

 Egfr Non-  76.4    >60  

 

 Egfr   98.2    >60  3









 Comp Metabolic Panel  2012  Sodium  134 mmol/L  Low  135-145  









 Potassium  4.0 mmol/L    3.5-5.0  

 

 Chloride  100 mmol/L  Low  101-111  

 

 Co2 (Carbon Dioxide)  22.0 mmol/L    22-32  

 

 Anion Gap  12.0 mmol/L  High  2-11  4

 

 Glucose  222 mg/dL  High    

 

 BUN  12 mg/dL    6-24  

 

 Creatinine  0.8 mg/dL    0.50-1.40  

 

 One Over Creatinine  1.25      

 

 BUN/Creatinine Ratio  15.0    8-20  

 

 Calcium  9.8 mg/dL    8.1-9.9  

 

 Total Protein  6.5 GM/DL    6.2-8.1  

 

 Albumin  3.9 GM/DL    3.6-5.4  

 

 Globulin  2.6 GM/DL    2-4  

 

 Albumin/Globulin Ratio  1.5    1-3  

 

 Bilirubin Total  0.7 mg/dL    0.4-1.5  5

 

 Alkaline Phosphatase  85 U/L      

 

 Alt (SGPT)  18 U/L    14-54  

 

 Ast (Sgot)  19 U/L    12-42  

 

 eGFR Non-  73.4    > 60  

 

 eGFR   94.4    > 60  6









 Laboratory test finding  2012  Magnesium  1.9 mg/dL    1.7-2.6  

 

 CBC Auto Diff  2012  White Blood Count  9.3 CUMM    4.8-10.8  









 Red Cell Count  3.92 CUMM  Low  4.2-5.4  

 

 Hemoglobin  11.6 g/dL  Low  12.0-16.0  

 

 Hematocrit  34 %  Low  35-47  

 

 Mean Corpuscular Volume  86 um3    79-97  

 

 Mean Corpuscular Hemoglob  30 pg    27-31  

 

 Mean Corpuscular HGB Cone  34 g/dL    32-36  

 

 Redcell Distribution WDTH  13 %    10.5-15  

 

 Platelet Count  305 CUMM    150-450  

 

 Mean Platelet Volume  8.3 um3    7.4-10.4  

 

 Gran %  52.9 %    38-83  

 

 Lymph %  36.7 %    25-47  

 

 Mononuclear %  7.6 %    1-9  

 

 Eosinophil %  1.9 %    0-6  

 

 Basophil %  0.9 %    0-2  

 

 Abs Lymphs  3.4    1.0-4.8  

 

 Abs Mononuclear  0.7    0-0.8  

 

 Absolute Neutrophil Count  4.9    1.5-7.7  

 

 Abs Eosinophils  0.2    0-0.6  

 

 Abs Basophils  0.1    0-0.2  









 CBC Auto Diff  2012  White Blood Count  8.9 CUMM    4.8-10.8  7









 Red Cell Count  4.61 CUMM    4.2-5.4  7

 

 Hemoglobin  13.9 g/dL    12.0-16.0  7

 

 Hematocrit  40 %    35-47  7

 

 Mean Corpuscular Volume  87 um3    79-97  7

 

 Mean Corpuscular Hemoglob  30 pg    27-31  7

 

 Mean Corpuscular HGB Cone  35 g/dL    32-36  7

 

 Redcell Distribution WDTH  13 %    10.5-15  7

 

 Platelet Count  246 CUMM    150-450  7

 

 Mean Platelet Volume  8.8 um3    7.4-10.4  7

 

 Gran %  71.0 %    38-83  7

 

 Lymph %  20.8 %  Low  25-47  7

 

 Mononuclear %  5.7 %    1-9  7

 

 Eosinophil %  2.2 %    0-6  7

 

 Basophil %  0.3 %    0-2  7

 

 Abs Lymphs  1.8    1.0-4.8  7

 

 Abs Mononuclear  0.5    0-0.8  7

 

 Absolute Neutrophil Count  6.3    1.5-7.7  7

 

 Abs Eosinophils  0.2    0-0.6  7

 

 Abs Basophils  0    0-0.2  7









 Type And Screen (Pre-Adm)  2012  Patient Blood Type  A POSITIVE      7









 Antibody Screen  NEGATIVE      7

 

 Specimen Discard Date  12      7, 8









 Basic Metabolic Panel  2012  Sodium  137 mmol/L    135-145  7









 Potassium  4.2 mmol/L    3.5-5.0  7

 

 Chloride  103 mmol/L    101-111  7

 

 Co2 (Carbon Dioxide)  25.0 mmol/L    22-32  7

 

 Anion Gap  9.0 mmol/L    2-11  7, 9

 

 Glucose  235 mg/dL  High    7

 

 BUN  8 mg/dL    6-24  7

 

 Creatinine  0.7 mg/dL    0.50-1.40  7

 

 One Over Creatinine  1.42      7

 

 BUN/Creatinine Ratio  11.4    8-20  7

 

 Calcium  8.8 mg/dL    8.1-9.9  7

 

 eGFR Non-  85.6    > 60  7

 

 eGFR   110.1    > 60  7, 10









 Urinalysis W/Microscopic  2012  Ua Color  YELLOW    Yellow  7









 Appearance-Urine  CLEAR    Clear  7

 

 Specific Gravity-Ur  1.021    1.010-1.030  7

 

 Esterase-Urine  1+    Negative  7

 

 Nitrite  NEGATIVE    Negative  7

 

 Urobilinogen-Ur-DIP  NEGATIVE    Negative  7

 

 Protein-Urine  NEGATIVE    Negative  7

 

 PH-Urine  5.0    5-9  7

 

 Blood-Urine  NEGATIVE    Negative  7

 

 Ketones-Urine  NEGATIVE    Negative  7

 

 Bilirubin-Ur  NEGATIVE    Negative  7

 

 Glucose-Urine  NEGATIVE    Negative  7

 

 WBC-Urine  3-5    0-5  7

 

 RBC-Urine  0-2    0-2  7

 

 Epith Cells-Ur  OCCASIONAL    None  7

 

 Bacteria-Urine  TRACE    None  7









 Urine Culture & Sensitivi  2012  M  ---------------- <SEE NOTE>      , 
11









 1  UNILATERAL PRIMARY OSTEOARTHRITIS, LEFT KNEE

 

 2  SEE RESULT BELOW



   -----------------------------------------------------------------------------
---------------



   Name:  HUE DU DESIRE                     : 1955    Attend Dr: Trenton James MD



   Acct:  [id number]  Unit: [id number]  AGE: 63            Location:  [facility]



   Re18                        SEX: F             Status:    REG REF



   -----------------------------------------------------------------------------
---------------



   



   SPEC: [id number]         TIFFANY:       [facility] DR: Trenton James MD



   REQ:  [id number]              RECD:   



   STATUS: COMP



   _



   SOURCE: URINE          SPDESC:



   ORDERED:  Urine Culture



   QUERIES:  Urine Source: Clean Catch



   



   -----------------------------------------------------------------------------
---------------



   Procedure                         Result                         Reported   
        Site



   -----------------------------------------------------------------------------
---------------



   Urine Culture  Final                                             18-
1015      ML



   



   No growth of clinically significant organisms



   



   -----------------------------------------------------------------------------
---------------



   * ML - Main Lab



   .



   



   



   



   



   



   



   



   



   



   



   



   



   



   



   



   



   



   



   



   



   



   



   



   



   



   ** END OF REPORT **



   



   DEPARTMENT OF PATHOLOGY,  [address] [place] [address]



   Phone # [phone]      Fax #[phone]



   Duane Hopkins M.D. Director     [facility] # [id number]

 

 3  *******Because ethnic data is not always readily available,



   this report includes an eGFR for both -Americans and



   non- Americans.****



   The National Kidney Disease Education Program (NKDEP) does



   not endorse the use of the MDRD equation for patients that



   are not between the ages of 18 and 70, are pregnant, have



   extremes of body size, muscle mass, or nutritional status,



   or are non- or non-.



   According to the National Kidney Foundation, irrespective of



   diagnosis, the stage of the disease is based on the level of



   kidney function:



   Stage Description                      GFR(mL/min/1.73 m(2))



   1     Kidney damage with normal or decreased GFR       90



   2     Kidney damage with mild decrease in GFR          60-89



   3     Moderate decrease in GFR                         30-59



   4     Severe decrease in GFR                           15-29



   5     Kidney failure                       <15 (or dialysis)

 

 4  Anion gap measurement may be of limited value in the



   presence of any alkalosis, especially in a combined acid



   base disorder.



   .

 

 5  A metabolite of Naproxen, O-desmethylnaproxen, has been



   shown to interfere with the Jendrassik-Radha method for



   measuring total bilirubin.  Samples from patients who have



   taken Naproxen have shown spurious elevation in total



   bilirubin levels.

 

 6  *******Because ethnic data is not always readily available,



   this report includes an eGFR for both -Americans and



   non- Americans.****



   The National Kidney Disease Education Program (NKDEP) does



   not endorse the use of the MDRD equation for patients that



   are not between the ages of 18 and 70, are pregnant, have



   extremes of body size, muscle mass, or nutritional status,



   or are non- or non-.



   According to the National Kidney Foundation, irrespective of



   diagnosis, the stage of the disease is based on the level of



   kidney function:



   Stage Description                      GFR(mL/min/1.73 m(2))



   1     Kidney damage with normal or decreased GFR       90



   2     Kidney damage with mild decrease in GFR          60-89



   3     Moderate decrease in GFR                         30-59



   4     Severe decrease in GFR                           15-29



   5     Kidney failure                       <15 (or dialysis)

 

 7  AA 12

 

 8  PREADMISSION TESTING SAMPLES FOR BLOOD BANK WILL BE HELD FOR



   14 DAYS FROM THE DATE OF COLLECTION *IF* THE FOLLOWING



   CRITERIA ARE MET:



   



   1) THE PATIENT HAS *NOT* BEEN PREGNANT IN THE LAST 3 MONTHS.



   2) THE PATIENT HAS *NOT* BEEN TRANSFUSED IN THE LAST 3



   MONTHS.



   ============================================================



   PREADMISSION TESTING SAMPLES WILL *NOT* BE HELD FOR 14 DAYS



   FROM PATIENTS WHO IN THE LAST 3 MONTHS:



   



   1) HAVE BEEN PREGNANT



   2) HAVE BEEN TRANSFUSED



   



   THESE PATIENTS *MUST* BE COLLECTED WITHIN 3 DAYS OF THE



   SURGERY DATE.

 

 9  Anion gap measurement may be of limited value in the



   presence of any alkalosis, especially in a combined acid



   base disorder.



   .

 

 10  *******Because ethnic data is not always readily available,



   this report includes an eGFR for both -Americans and



   non- Americans.****



   The National Kidney Disease Education Program (NKDEP) does



   not endorse the use of the MDRD equation for patients that



   are not between the ages of 18 and 70, are pregnant, have



   extremes of body size, muscle mass, or nutritional status,



   or are non- or non-.



   According to the National Kidney Foundation, irrespective of



   diagnosis, the stage of the disease is based on the level of



   kidney function:



   Stage Description                      GFR(mL/min/1.73 m(2))



   1     Kidney damage with normal or decreased GFR       90



   2     Kidney damage with mild decrease in GFR          60-89



   3     Moderate decrease in GFR                         30-59



   4     Severe decrease in GFR                           15-29



   5     Kidney failure                       <15 (or dialysis)

 

 11  ---------------------------------------------------------------------------
-----------------



   RUN DATE: 12               [facility] NMI **LIVE**         
       PAGE 1



   RUN TIME: 903                            Specimen Inquiry



   RUN USER: INTERFACE



   -----------------------------------------------------------------------------
---------------



   Name: HUE DU                     Acct#: [id number]      Status: REG REF  
   Re12



   Age/Sex: 57/F                         Unit#: [id number]       Location: [facility]



    : [birth year]



   -----------------------------------------------------------------------------
---------------



   



   SPEC #: [id number]      TIFFANY:      STATUS:  COMP           
REQ #: [id number]



   RECD:      RICH DR: Trenton James MD



   SOURCE: URINE              ENTR:      KASSI DR: Wendy SPARKS,
Alyson DÍAZ



   SPDESC:



   ORDERED:  URINE C   S



   QUERIES:  SPECIMEN DESCRIPTION: URINE, RANDOM



   ACT WKST: UR 12 #1



   -----------------------------------------------------------------------------
---------------



   Procedure                         Result                         Verified   
        Site



   -----------------------------------------------------------------------------
---------------



   > URINE CULTURE   SENSITIVI  Final                                 12-
903       ML



   SCANT NORMAL URETHRAL OR PERINEAL LEVI



   



   -----------------------------------------------------------------------------
---------------



   ML - [facility] Permit #[id number]



   [address] [place] [address]        Ph: [phone]  Fax: [phone]



   



   



   



   



   



   



   



   



   



   



   



   



   



   



   



   



   



   



   



   



   



   



   



   



   



   



   



   



   



   -----------------------------------------------------------------------------
---------------



   DEPARTMENT OF PATHOLOGY, [address] [place] [address]



   Phone #[phone]   Fax #[phone]   [facility] Permit #[id number]



   Duane Hopkins M.D. Director   Wilber Moreau M.D. 



   -----------------------------------------------------------------------------
---------------







Procedures







 Date  CPT Code  Description  Status

 

 2018  73564  Treadmill Interp/Report Only  Completed

 

 2018  67049  Stress Test Supervsn W/Out I/R  Completed

 

 2018  91132  TKR Total Knee Replacement  Completed

 

 2018  42706  TKR Total Knee Replacement  Completed

 

 2018  68478  ECHO Transthoracic, Real-Time 2D With Doppler And Color  
Completed



     Flow  

 

 2018  68432  ECHO Transthoracic, Real-Time 2D With Doppler And Color  
Completed



     Flow  

 

 2018  25570  EKG Tracing & Interpretation  Completed

 

 2018  83007  EKG, Interpretation Only  Completed

 

 2013  48400  Rad Exam; Knee, Ap&L  Completed

 

 2013  80357  Xray Knee 3 Views  Completed

 

 2012  89178  Xray Knee 3 Views  Completed

 

 2012  61190  Rad Exam; Knee, Ap&L  Completed

 

 2012  22479  TKR Total Knee Replacement  Completed

 

 2012  33859  TKR Total Knee Replacement  Completed

 

 2012  80229  Xray Knee 3 Views  Completed

 

 2012  52324  Rad Exam; Knee, Ap&L  Completed

 

 2011  60001  Rad Exam; Both Knees, Standing Ap  Completed

 

 2011  50367  Rad Exam; Knee, Ap&L  Completed







Encounters







 Type  Date  Location  Provider  CPT E/M  Dx

 

 [facility]  [address]  
F41.0



   3:37p  Hospitalists      

 

 [facility]  [address]  
R00.2



   3:37p  Hospitalists      









 R42

 

 R61









 Office Visit  2018 10:17a  [facility]  [place]  
[address]  Z47.1



     Hospitalists      









 Z96.652

 

 M17.12

 

 M25.562

 

 E11.9









 Office Visit  2018 10:16a  [facility]  [place]  [address]  
Z47.1



     [facility], Hospital[facility]      









 Z96.652

 

 M17.12

 

 E11.9

 

 I10

 

 E78.5









 Office Visit  2018 11:45a  [facility]  [place] [place],  
[address]  I10



     Leti JOHNSON    









 E11.9

 

 R94.31

 

 Z01.810

 

 E66.9

 

 E78.4









 Office Visit  2018 10:45a  [facility]  [place] James M.D.  
[address]  M17.12



     C.M.A.      

 

 Office Visit  2017  3:58p  [facility],[place],  
[address]  N12



     Hospitalists  M.D.    









 E11.9

 

 R10.9

 

 I10









 Office Visit  2017  3:57p  [facility],  [place], [place]  
[address]  N12



     Hospitalists      









 E11.9

 

 R10.9

 

 I10









 Office Visit  2017 10:00a  [facility]  [place] James M.D.  
[address]  M17.12



     C.M.A.      

 

 Office Visit  2013  9:00a  [facility]  [place] James M.D.  
[address]  [phone]



     C.M.A.      

 

 Office Visit  2012  8:00a  [facility]  [place],  [address]  
[phone]



     C.M.A.  RPA-C    

 

 Office Visit  2012  9:15a  [facility]  [place] James M.D.  
[address]  [phone]



     C.M.A.      

 

 Office Visit  2011  8:15a  [facility]  [place] James M.D.  
[address]  [phone]



     C.M.A.      

 

 Office Visit  2011  9:30a  [facility]  [place] James M.D.  
[address]  [phone]



     C.M.A.      







Plan of Care

Future Appointment(s):2019  8:15 am - [place]on James M.D. at [facility]2018 - [place]on James M.D.Z96.652 Presence of left 
artificial knee jointFollow up:Follow up:   6-12 months and as needed  Keep 
doing leg lifting exercises, 5 seconds each try to repeat 20 times on your back 
and stomach  Antibiotic for the dentist

## 2018-10-11 NOTE — RAD
HISTORY: FALL, PAIN, left wrist pain



COMPARISONS: None



VIEWS: 4 , Frontal, lateral, and oblique views of the left wrist 



FINDINGS:



BONE DENSITY: There is diffuse osteopenia.

BONES: There is a transverse, dorsally angulated fracture of the distal radial metaphysis.



JOINTS: There is advanced osteoarthritis of the first CMC and STT joints. 

ALIGNMENT: There is no dislocation. 

SOFT TISSUES: Unremarkable.



OTHER FINDINGS: None.



IMPRESSION: 

DORSALLY ANGULATED FRACTURE OF THE DISTAL RADIAL METAPHYSIS

## 2018-10-11 NOTE — ED
Adult Trauma





- HPI Summary


HPI Summary: 


This patient is a 63 year old F [facility] neighbor 

with a chief complaint of fall and associated injuries since PTA. Pt was 

clearing the floor to vacuum and tripped, landing on her left side, injuring 

her left wrist/forearm with DROM secondary to pain, as well as her injuring her 

left leg with associated ecchymosis and abrasions. SHx left knee replacement 6/ 2018. Pt able to ambulate with a cane, bear weight, bend at knee. She denies 

hitting her head or neck.





- History of Current Complaint


Chief Complaint: EDExtremityUpper


Stated Complaint: LT WRIST INJURY


Time Seen by Provider: 10/11/18 12:14


Hx Obtained From: Patient


Hx Last Menstrual Period: n/a


Mechanism of Injury: Fall


Ambulatory at the Scene: Yes


Loss of Consciousness: no loss of consciousness


Onset/Duration: Started Hours Ago


Onset of Pain: Immediate


Onset Severity: Severe


Current Severity: Severe


Pain Intensity: 10


Pain Scale Used: 0-10 Numeric


Location: Extremities - LLE, LUE


Character: Sharp


Aggravating Factor(s): Movement


Alleviating Factor(s): Nothing


Associated Signs & Symptoms: Positive: Ecchymosis.  Negative: Fever





- Additional Pertinent History


Primary Care Physician: [id number]





- Allergy/Home Medications


Allergies/Adverse Reactions: 


 Allergies











Allergy/AdvReac Type Severity Reaction Status Date / Time


 


morphine Allergy  Nausea And Verified 10/11/18 12:12





   Vomiting  


 


Penicillins Allergy  See Comment Verified 10/11/18 12:12


 


aspirin AdvReac  GI BLEED Verified 10/11/18 12:12














PMH/Surg Hx/FS Hx/Imm Hx


Endocrine/Hematology History: Reports: Hx Diabetes - DM II


   Denies: Hx Thyroid Disease


Cardiovascular History: Reports: Hx Angina, Hx Hypercholesterolemia, Hx 

Hypertension - CONTROLLED


   Denies: Hx Congestive Heart Failure, Hx Coronary Artery Disease, Hx 

Myocardial Infarction, Hx Valvular Heart Disease


Respiratory History: 


   Denies: Hx Asthma, Hx Chronic Obstructive Pulmonary Disease (COPD)


GI History: Reports: Hx Gastroesophageal Reflux Disease - ON MEDICATION FOR, Hx 

Irritable Bowel


   Denies: Hx Ulcer


 History: Reports: Hx Kidney Infection - HX OF, Hx Kidney Stones - HX OF IN 

THE PAST


Musculoskeletal History: Reports: Hx Arthritis - KNEES, HANDS


Sensory History: Reports: Hx Contacts or Glasses - for reading


   Denies: Hx Hearing Aid, Other Sensory Impairments


Opthamlomology History: Reports: Hx Contacts or Glasses - for reading


   Denies: Other Sensory Impairments





- Surgical History


Surgery Procedure, Year, and Place: choley, knee


Hx Anesthesia Reactions: Yes - A LITTLE SLOW TO WAKE UP


Infectious Disease History: No


Infectious Disease History: 


   Denies: Hx Clostridium Difficile, Hx Hepatitis, Hx Human Immunodeficiency 

Virus (HIV), Hx of Known/Suspected MRSA, Hx Shingles, Hx Tuberculosis, Traveled 

Outside the [place] in Last 30 Days





- Family History


Known Family History: Positive: Hypertension, Diabetes





- Social History


Lives: Alone


Alcohol Use: Occasionally


Alcohol Amount: 2 GLASSES OF WINE ON SUNDAYS


Substance Use Type: Reports: None


Substance Use Comment - Amount & Last Used: 3 CUPS OF COFFEE DAILY


Hx Tobacco Use: Yes


Smoking Status (MU): Former Smoker


Amount Used/How Often: 3-4 CIGARETTES PER DAY X 10 YEARS


Have You Smoked in the Last Year: No





Review of Systems


Negative: Fever, Chills


Negative: Erythema


Negative: Sore Throat


Negative: Chest Pain


Negative: Shortness Of Breath, Cough


Negative: Abdominal Pain, Vomiting, Nausea


Negative: dysuria, hematuria


Positive: Arthralgia - Left knee, left wrist, Decreased ROM


Positive: Bruising - LLE


Neurological: Other - NEGATIVE: Dizziness


All Other Systems Reviewed And Are Negative: Yes





Physical Exam





- Summary


Physical Exam Summary: 


Constitutional: Well-developed, Well-nourished, Alert. (-) Distressed


Skin: Warm, Dry


HENT: Normocephalic; Atraumatic


Eyes: Conjunctiva normal


Neck: Musculoskeletal ROM normal neck. (-) JVD, (-) Stridor, (-) Tracheal 

deviation


Cardio: Rhythm regular, rate normal, Heart sounds normal; Intact distal pulses; 

The pedal pulses are 2+ and symmetric. Radial pulses are 2+ and symmetric. (-) 

Murmur


Pulmonary/Chest wall: Effort normal. (-) Respiratory distress, (-) Wheezes, (-) 

Rales


Abd: Soft, (-) epigastric tenderness, (-) Distension, (-) Guarding, (-) Rebound


Musculoskeletal: (-) Edema, deformed left wrist which is dorsally and laterally 

displaced, no ROM secondary to pain. No tenderness in hand, distal pulses 

intact.


Lymph: (-) Cervical adenopathy


Neuro: Alert, Oriented x3


Psych: Mood and affect Normal


Triage Information Reviewed: Yes


Vital Signs On Initial Exam: 


 Initial Vitals











Temp Pulse Resp BP Pulse Ox


 


 97.1 F   96   17   146/67   97 


 


 10/11/18 12:09  10/11/18 12:09  10/11/18 12:09  10/11/18 12:09  10/11/18 12:09











Vital Signs Reviewed: Yes





Procedures





- Splinting


  ** Left Upper Extremity


Location: L wrist


Hand-Made Type: orthoglass - 20 cm of 3 inch orthoglass


Splint: volar


Pre-Proc Neuro Vasc Exam: normal


Post-Proc Neuro Vasc Exam: normal, unchanged from pre-exam





Diagnostics





- Vital Signs


 Vital Signs











  Temp Pulse Resp BP Pulse Ox


 


 10/11/18 12:09  97.1 F  96  17  146/67  97














- Laboratory


Lab Statement: Any lab studies that have been ordered have been reviewed, and 

results considered in the medical decision making process.





- Radiology


  ** LLE XR


Radiology Interpretation Completed By: Radiologist - 1.  OSTEOPENIA. 2.  STATUS 

POST LEFT KNEE ARTHROPLASTY. 3.  NO ACUTE OSSEOUS INJURY. IF SYMPTOMS PERSIST, 

RECOMMEND REPEAT IMAGING. Dr. Huynh has reviewed this report.





  ** L wrist


Xray Interpretation: Positive (See Comments)


Radiology Interpretation Completed By: Radiologist - DORSALLY ANGULATED 

FRACTURE OF THE DISTAL RADIAL METAPHYSIS. Dr. Huynh has reviewed this report.





Adult Trauma Course/Dx





- Course


Course Of Treatment: A 63-year-old F presents to the ED with a CC of fall and 

associated injuries PTA. (+) L wrist pain and deformity, with DROM secondary to 

pain. LLE pain, ecchymosis. (-) hitting head or neck. Pt fell while clearing 

the floor, tripping and falling onto her left side. L wrist XR: DORSALLY 

ANGULATED FRACTURE OF THE DISTAL RADIAL METAPHYSIS. LLE XR: 1.  OSTEOPENIA. 2.  

STATUS POST LEFT KNEE ARTHROPLASTY. 3.  NO ACUTE OSSEOUS INJURY. IF SYMPTOMS 

PERSIST, RECOMMEND REPEAT IMAGING. In the ED course, pt was given Norco.  Right-

hand dominant, should be able to function at home.





- Diagnoses


Provider Diagnoses: 


 Distal radial fracture








Discharge





- Sign-Out/Discharge


Documenting (check all that apply): Patient Departure - discharge





- Discharge Plan


Condition: Stable


Disposition: HOME


Prescriptions: 


oxyCODONE/Acetamin 5/325 MG* [Percocet 5/325 TAB*] 1 tab PO Q6H PRN #12 tab MDD 

4


 PRN Reason: Pain - Severe


Patient Education Materials:  Splint Care (ED), Wrist Fracture in Adults (ED)


Referrals: 


Genny Juarez MD [Medical Doctor] - 


Additional Instructions: 


Please return to the emergency department for any new or worsening symptoms.


Follow up with the provided orthopedist (Dr. Juarez) in 3-5 days.





- Attestation Statements


Document Initiated by Scribe: Yes


Documenting Scribe: Andrea Don


Provider For Whom Scribe is Documenting (Include Credential): Dr. Blayne Huynh MD


Scribe Attestation: 


Andrea DONNELLY, scribed for Dr. Blayne Huynh MD on 10/11/18 at 1545.